# Patient Record
Sex: MALE | Race: BLACK OR AFRICAN AMERICAN | NOT HISPANIC OR LATINO | Employment: UNEMPLOYED | URBAN - METROPOLITAN AREA
[De-identification: names, ages, dates, MRNs, and addresses within clinical notes are randomized per-mention and may not be internally consistent; named-entity substitution may affect disease eponyms.]

---

## 2020-10-23 ENCOUNTER — HOSPITAL ENCOUNTER (INPATIENT)
Facility: HOSPITAL | Age: 30
LOS: 9 days | Discharge: HOME | DRG: 164 | End: 2020-11-01
Attending: HOSPITALIST | Admitting: HOSPITALIST
Payer: MEDICAID

## 2020-10-23 DIAGNOSIS — D52.8 OTHER FOLATE DEFICIENCY ANEMIAS: ICD-10-CM

## 2020-10-23 DIAGNOSIS — E66.01 MORBID OBESITY WITH BODY MASS INDEX OF 50.0-59.9 IN ADULT: ICD-10-CM

## 2020-10-23 DIAGNOSIS — D50.8 OTHER IRON DEFICIENCY ANEMIA: ICD-10-CM

## 2020-10-23 DIAGNOSIS — D75.A G6PD DEFICIENCY: ICD-10-CM

## 2020-10-23 DIAGNOSIS — J98.4 CAVITARY LESION OF LUNG: ICD-10-CM

## 2020-10-23 DIAGNOSIS — J93.0 SPONTANEOUS TENSION PNEUMOTHORAX: ICD-10-CM

## 2020-10-23 DIAGNOSIS — Z22.322 MRSA (METHICILLIN RESISTANT STAPH AUREUS) CULTURE POSITIVE: ICD-10-CM

## 2020-10-23 DIAGNOSIS — J93.12 SECONDARY SPONTANEOUS PNEUMOTHORAX: ICD-10-CM

## 2020-10-23 PROBLEM — J93.9 PNEUMOTHORAX: Status: ACTIVE | Noted: 2020-10-23

## 2020-10-23 PROCEDURE — 11000001 HC ACUTE MED/SURG PRIVATE ROOM

## 2020-10-23 RX ORDER — IBUPROFEN 200 MG
24 TABLET ORAL
Status: DISCONTINUED | OUTPATIENT
Start: 2020-10-24 | End: 2020-11-01 | Stop reason: HOSPADM

## 2020-10-23 RX ORDER — MORPHINE SULFATE 2 MG/ML
2 INJECTION, SOLUTION INTRAMUSCULAR; INTRAVENOUS EVERY 4 HOURS PRN
Status: DISCONTINUED | OUTPATIENT
Start: 2020-10-23 | End: 2020-11-01 | Stop reason: HOSPADM

## 2020-10-23 RX ORDER — IBUPROFEN 200 MG
16 TABLET ORAL
Status: DISCONTINUED | OUTPATIENT
Start: 2020-10-24 | End: 2020-11-01 | Stop reason: HOSPADM

## 2020-10-23 RX ORDER — GLUCAGON 1 MG
1 KIT INJECTION
Status: DISCONTINUED | OUTPATIENT
Start: 2020-10-24 | End: 2020-11-01 | Stop reason: HOSPADM

## 2020-10-23 RX ORDER — SODIUM CHLORIDE 0.9 % (FLUSH) 0.9 %
10 SYRINGE (ML) INJECTION
Status: DISCONTINUED | OUTPATIENT
Start: 2020-10-24 | End: 2020-11-01 | Stop reason: HOSPADM

## 2020-10-23 RX ORDER — ACETAMINOPHEN 325 MG/1
650 TABLET ORAL EVERY 6 HOURS PRN
Status: DISCONTINUED | OUTPATIENT
Start: 2020-10-24 | End: 2020-11-01 | Stop reason: HOSPADM

## 2020-10-23 RX ORDER — MORPHINE SULFATE 2 MG/ML
2 INJECTION, SOLUTION INTRAMUSCULAR; INTRAVENOUS EVERY 4 HOURS PRN
Status: DISCONTINUED | OUTPATIENT
Start: 2020-10-24 | End: 2020-10-23

## 2020-10-23 NOTE — LETTER
November 1, 2020             [facility]  [address] [place]  [address]  Phone: [phone]  Fax: [phone] November 1, 2020     Patient: Reggie An   YOB: 1990       To Whom It May Concern:    Reggie An was admitted to the hospital on 10/23/2020 10:45 PM and discharged on .  He may return to work on 11/8/2020 .  If you have any questions or concerns, please don't hesitate to call  office at [phone].      Sincerely,        Eliud Novoa MD  Department of Hospital Medicine

## 2020-10-23 NOTE — PLAN OF CARE
Outside Transfer Acceptance Note / Regional Referral Center    Date of acceptance: 10/23/2020    Referring facility/ provider:  [facility]/ Dr Maldonado    Reason for transfer: Higher level of care /  recurrent pneumothorax    Report from referring provider:    30-year-old m with a PMH of class 3 obesity was admitted to [facility] on 10/16 for a left pneumothorax presenting with acute onset of left-sided CP and SOB.      The patient was recently discharged from [facility] on 09/21 after a 4 day admission for left lower lung cavitary lesion.  CT chest at that time demonstrated a 5.7 x 4.4 cm left lower lobe cavitary lesion.  His respiratory cultures grew MRSA and AFB pos bacteria, but TB PCR was neg.  He was discharged home on Augmentin times 30 days with ID follow-up.    CXR on admission this time demonstrated left pneumothorax and the patient underwent left chest tube thoracostomy by general surgery in the ED.  CT chest demonstrated left lower lobe cavity lesion and a large residual of pneumothorax.  Subsequently he underwent a 2nd chest tube placement with the lung collapsing whenever the chest tube is clamped.  The referring physician spoke to [facility] thoracic surgery (Dr. Benitez) who agreed to evaluate the patient and asked that the patient be admitted to     Labs today Na 136 K 4.0 Cl 103 CO2 27 Glu 93 BUN 13 Cr 0.9 calcium 8.8 phosphorus 4.8 Mag 2.1 AST 21 ALT 46 alk-phos 69 lipase 96 troponin < 0.015 total bili 0.3 WBC 7.8 Hb 10.9 COVID neg    To Do List:    Admit to   Consult thoracic surgery (Dr. Benitez) and infectious disease    Upon patient arrival to floor, please send SecureChat to [facility] or call extension [phone] (if no answer, this will flip to a beeper, so enter your call back number) for Hospital Medicine admit team assignment and for additional admit orders for the patient.  Do not page the attending physician associated with the  patient on arrival (this physician may not be on duty at the time of arrival).  Rather, always call [phone] to reach the triage physician for orders and team assignment.    Agusto Bobo MD [facility]  / Hospital Medicine

## 2020-10-24 PROBLEM — D50.9 IRON DEFICIENCY ANEMIA: Status: ACTIVE | Noted: 2020-10-24

## 2020-10-24 PROBLEM — D52.9 FOLATE DEFICIENCY ANEMIA: Status: ACTIVE | Noted: 2020-10-24

## 2020-10-24 PROBLEM — J93.0 SPONTANEOUS TENSION PNEUMOTHORAX: Status: ACTIVE | Noted: 2020-10-23

## 2020-10-24 LAB
ALBUMIN SERPL BCP-MCNC: 3.3 G/DL (ref 3.5–5.2)
ALP SERPL-CCNC: 63 U/L (ref 55–135)
ALT SERPL W/O P-5'-P-CCNC: 17 U/L (ref 10–44)
ANION GAP SERPL CALC-SCNC: 8 MMOL/L (ref 8–16)
APTT BLDCRRT: 77.2 SEC (ref 21–32)
AST SERPL-CCNC: 16 U/L (ref 10–40)
BASOPHILS # BLD AUTO: 0.04 K/UL (ref 0–0.2)
BASOPHILS NFR BLD: 0.4 % (ref 0–1.9)
BILIRUB SERPL-MCNC: 0.4 MG/DL (ref 0.1–1)
BNP SERPL-MCNC: <10 PG/ML (ref 0–99)
BUN SERPL-MCNC: 11 MG/DL (ref 6–20)
CALCIUM SERPL-MCNC: 9.3 MG/DL (ref 8.7–10.5)
CHLORIDE SERPL-SCNC: 100 MMOL/L (ref 95–110)
CO2 SERPL-SCNC: 27 MMOL/L (ref 23–29)
CREAT SERPL-MCNC: 0.8 MG/DL (ref 0.5–1.4)
CREAT SERPL-MCNC: 0.8 MG/DL (ref 0.5–1.4)
DIFFERENTIAL METHOD: ABNORMAL
EOSINOPHIL # BLD AUTO: 0.3 K/UL (ref 0–0.5)
EOSINOPHIL NFR BLD: 2.9 % (ref 0–8)
ERYTHROCYTE [DISTWIDTH] IN BLOOD BY AUTOMATED COUNT: 14.1 % (ref 11.5–14.5)
EST. GFR  (AFRICAN AMERICAN): >60 ML/MIN/1.73 M^2
EST. GFR  (AFRICAN AMERICAN): >60 ML/MIN/1.73 M^2
EST. GFR  (NON AFRICAN AMERICAN): >60 ML/MIN/1.73 M^2
EST. GFR  (NON AFRICAN AMERICAN): >60 ML/MIN/1.73 M^2
ESTIMATED AVG GLUCOSE: 80 MG/DL (ref 68–131)
FERRITIN SERPL-MCNC: 217 NG/ML (ref 20–300)
FOLATE SERPL-MCNC: 3.8 NG/ML (ref 4–24)
GLUCOSE SERPL-MCNC: 101 MG/DL (ref 70–110)
HBA1C MFR BLD HPLC: 4.4 % (ref 4–5.6)
HCT VFR BLD AUTO: 37 % (ref 40–54)
HGB BLD-MCNC: 10.8 G/DL (ref 14–18)
IMM GRANULOCYTES # BLD AUTO: 0.02 K/UL (ref 0–0.04)
IMM GRANULOCYTES NFR BLD AUTO: 0.2 % (ref 0–0.5)
INR PPP: 1 (ref 0.8–1.2)
INR PPP: 4 (ref 0.8–1.2)
IRON SERPL-MCNC: 17 UG/DL (ref 45–160)
LYMPHOCYTES # BLD AUTO: 2.4 K/UL (ref 1–4.8)
LYMPHOCYTES NFR BLD: 25.4 % (ref 18–48)
MAGNESIUM SERPL-MCNC: 1.8 MG/DL (ref 1.6–2.6)
MCH RBC QN AUTO: 26.5 PG (ref 27–31)
MCHC RBC AUTO-ENTMCNC: 29.2 G/DL (ref 32–36)
MCV RBC AUTO: 91 FL (ref 82–98)
MONOCYTES # BLD AUTO: 1 K/UL (ref 0.3–1)
MONOCYTES NFR BLD: 10.3 % (ref 4–15)
NEUTROPHILS # BLD AUTO: 5.8 K/UL (ref 1.8–7.7)
NEUTROPHILS NFR BLD: 60.8 % (ref 38–73)
NRBC BLD-RTO: 0 /100 WBC
PHOSPHATE SERPL-MCNC: 4.8 MG/DL (ref 2.7–4.5)
PLATELET # BLD AUTO: 321 K/UL (ref 150–350)
PMV BLD AUTO: 10.3 FL (ref 9.2–12.9)
POTASSIUM SERPL-SCNC: 4.4 MMOL/L (ref 3.5–5.1)
PROT SERPL-MCNC: 8.3 G/DL (ref 6–8.4)
PROTHROMBIN TIME: 11.4 SEC (ref 9–12.5)
PROTHROMBIN TIME: 41.2 SEC (ref 9–12.5)
RBC # BLD AUTO: 4.07 M/UL (ref 4.6–6.2)
SATURATED IRON: 6 % (ref 20–50)
SODIUM SERPL-SCNC: 135 MMOL/L (ref 136–145)
TOTAL IRON BINDING CAPACITY: 289 UG/DL (ref 250–450)
TRANSFERRIN SERPL-MCNC: 195 MG/DL (ref 200–375)
TSH SERPL DL<=0.005 MIU/L-ACNC: 1.54 UIU/ML (ref 0.4–4)
VIT B12 SERPL-MCNC: 492 PG/ML (ref 210–950)
WBC # BLD AUTO: 9.45 K/UL (ref 3.9–12.7)

## 2020-10-24 PROCEDURE — 99223 PR INITIAL HOSPITAL CARE,LEVL III: ICD-10-PCS | Mod: ,,, | Performed by: INTERNAL MEDICINE

## 2020-10-24 PROCEDURE — 85025 COMPLETE CBC W/AUTO DIFF WBC: CPT

## 2020-10-24 PROCEDURE — 25000003 PHARM REV CODE 250: Performed by: STUDENT IN AN ORGANIZED HEALTH CARE EDUCATION/TRAINING PROGRAM

## 2020-10-24 PROCEDURE — 86703 HIV-1/HIV-2 1 RESULT ANTBDY: CPT

## 2020-10-24 PROCEDURE — 83036 HEMOGLOBIN GLYCOSYLATED A1C: CPT

## 2020-10-24 PROCEDURE — 82955 ASSAY OF G6PD ENZYME: CPT

## 2020-10-24 PROCEDURE — 85610 PROTHROMBIN TIME: CPT | Mod: 91

## 2020-10-24 PROCEDURE — 83540 ASSAY OF IRON: CPT

## 2020-10-24 PROCEDURE — 99233 PR SUBSEQUENT HOSPITAL CARE,LEVL III: ICD-10-PCS | Mod: ,,, | Performed by: STUDENT IN AN ORGANIZED HEALTH CARE EDUCATION/TRAINING PROGRAM

## 2020-10-24 PROCEDURE — 63600175 PHARM REV CODE 636 W HCPCS: Performed by: STUDENT IN AN ORGANIZED HEALTH CARE EDUCATION/TRAINING PROGRAM

## 2020-10-24 PROCEDURE — 83735 ASSAY OF MAGNESIUM: CPT

## 2020-10-24 PROCEDURE — 99233 PR SUBSEQUENT HOSPITAL CARE,LEVL III: ICD-10-PCS | Mod: ,,, | Performed by: THORACIC SURGERY (CARDIOTHORACIC VASCULAR SURGERY)

## 2020-10-24 PROCEDURE — 83880 ASSAY OF NATRIURETIC PEPTIDE: CPT

## 2020-10-24 PROCEDURE — 85610 PROTHROMBIN TIME: CPT

## 2020-10-24 PROCEDURE — 84443 ASSAY THYROID STIM HORMONE: CPT

## 2020-10-24 PROCEDURE — 82728 ASSAY OF FERRITIN: CPT

## 2020-10-24 PROCEDURE — 99233 SBSQ HOSP IP/OBS HIGH 50: CPT | Mod: ,,, | Performed by: STUDENT IN AN ORGANIZED HEALTH CARE EDUCATION/TRAINING PROGRAM

## 2020-10-24 PROCEDURE — 87040 BLOOD CULTURE FOR BACTERIA: CPT | Mod: 59

## 2020-10-24 PROCEDURE — 82746 ASSAY OF FOLIC ACID SERUM: CPT

## 2020-10-24 PROCEDURE — 85730 THROMBOPLASTIN TIME PARTIAL: CPT

## 2020-10-24 PROCEDURE — 82607 VITAMIN B-12: CPT

## 2020-10-24 PROCEDURE — 82565 ASSAY OF CREATININE: CPT

## 2020-10-24 PROCEDURE — 99233 SBSQ HOSP IP/OBS HIGH 50: CPT | Mod: ,,, | Performed by: THORACIC SURGERY (CARDIOTHORACIC VASCULAR SURGERY)

## 2020-10-24 PROCEDURE — 11000001 HC ACUTE MED/SURG PRIVATE ROOM

## 2020-10-24 PROCEDURE — 84100 ASSAY OF PHOSPHORUS: CPT

## 2020-10-24 PROCEDURE — 80053 COMPREHEN METABOLIC PANEL: CPT

## 2020-10-24 PROCEDURE — 80074 ACUTE HEPATITIS PANEL: CPT

## 2020-10-24 PROCEDURE — 36415 COLL VENOUS BLD VENIPUNCTURE: CPT

## 2020-10-24 PROCEDURE — 99223 1ST HOSP IP/OBS HIGH 75: CPT | Mod: ,,, | Performed by: INTERNAL MEDICINE

## 2020-10-24 RX ORDER — CLINDAMYCIN HYDROCHLORIDE 150 MG/1
600 CAPSULE ORAL EVERY 8 HOURS
Status: DISCONTINUED | OUTPATIENT
Start: 2020-10-24 | End: 2020-10-24

## 2020-10-24 RX ORDER — ENOXAPARIN SODIUM 100 MG/ML
40 INJECTION SUBCUTANEOUS EVERY 12 HOURS
Status: COMPLETED | OUTPATIENT
Start: 2020-10-24 | End: 2020-10-27

## 2020-10-24 RX ORDER — MAGNESIUM SULFATE HEPTAHYDRATE 40 MG/ML
2 INJECTION, SOLUTION INTRAVENOUS ONCE
Status: COMPLETED | OUTPATIENT
Start: 2020-10-24 | End: 2020-10-24

## 2020-10-24 RX ORDER — FERROUS SULFATE 325(65) MG
325 TABLET, DELAYED RELEASE (ENTERIC COATED) ORAL DAILY
Status: DISCONTINUED | OUTPATIENT
Start: 2020-10-24 | End: 2020-11-01 | Stop reason: HOSPADM

## 2020-10-24 RX ORDER — SENNOSIDES 8.6 MG/1
8.6 TABLET ORAL DAILY
Status: DISCONTINUED | OUTPATIENT
Start: 2020-10-24 | End: 2020-11-01 | Stop reason: HOSPADM

## 2020-10-24 RX ADMIN — MAGNESIUM SULFATE IN WATER 2 G: 40 INJECTION, SOLUTION INTRAVENOUS at 09:10

## 2020-10-24 RX ADMIN — MORPHINE SULFATE 2 MG: 2 INJECTION, SOLUTION INTRAMUSCULAR; INTRAVENOUS at 09:10

## 2020-10-24 RX ADMIN — CLINDAMYCIN HYDROCHLORIDE 600 MG: 150 CAPSULE ORAL at 06:10

## 2020-10-24 RX ADMIN — SENNOSIDES 8.6 MG: 8.6 TABLET, FILM COATED ORAL at 10:10

## 2020-10-24 RX ADMIN — FERROUS SULFATE TAB EC 325 MG (65 MG FE EQUIVALENT) 325 MG: 325 (65 FE) TABLET DELAYED RESPONSE at 10:10

## 2020-10-24 RX ADMIN — ACETAMINOPHEN 650 MG: 325 TABLET ORAL at 03:10

## 2020-10-24 RX ADMIN — MORPHINE SULFATE 2 MG: 2 INJECTION, SOLUTION INTRAMUSCULAR; INTRAVENOUS at 02:10

## 2020-10-24 RX ADMIN — FOLIC ACID-PYRIDOXINE-CYANOCOBALAMIN TAB 2.5-25-2 MG 1 TABLET: 2.5-25-2 TAB at 10:10

## 2020-10-24 RX ADMIN — ACETAMINOPHEN 650 MG: 325 TABLET ORAL at 10:10

## 2020-10-24 RX ADMIN — MORPHINE SULFATE 2 MG: 2 INJECTION, SOLUTION INTRAMUSCULAR; INTRAVENOUS at 06:10

## 2020-10-24 RX ADMIN — MORPHINE SULFATE 2 MG: 2 INJECTION, SOLUTION INTRAMUSCULAR; INTRAVENOUS at 12:10

## 2020-10-24 RX ADMIN — ENOXAPARIN SODIUM 40 MG: 40 INJECTION SUBCUTANEOUS at 09:10

## 2020-10-24 NOTE — PLAN OF CARE
On call CM requested to obtain records from [facility].  CM obtained signature from patient and faxed to [phone].

## 2020-10-24 NOTE — ASSESSMENT & PLAN NOTE
> Recurrent, currently with 2 (1 pigtail and 1 thoracostomy tube) chest tubes after initial one failed. Initially tension pneumothorax presumably due to his infectious cavitary lesion. Here for second opinion. Patient develops symptoms after clamped  > On 10/20, CT showed large left pneumothroax with mediastinal shift to right and large volume subcutaneous emphysema in left chest wall. Subsequent CXR showed adequate expansion after Chest tube adjusment.   > Repeat CXR here at [facility] show's small apical left sided pneumothorax    Likely recurrent pneumothorax due to his cavitary lesion.     - Chest tubes placed to suction, drains on side of bed  - Will repeat CXR for baseline since he was not sent with OSH records  - Consult thoracic surgery for recommendations   - Pain regimen added   - Supplemental O2 as needed

## 2020-10-24 NOTE — ASSESSMENT & PLAN NOTE
> Sputum cultures at [facility] MRSA+, + 1/3 AFB cultures, TB/MAC PCR negative.   > on 9/21 at [facility], CT chest demonstrated a 5.7 x 4.4 cm left lower lobe cavitary lesion. Was initially treated at [facility] with augmentin after growing gram negative rods on cultures, however was also growing MRSA. Today he was started on PO clindamycin at [facility] today  > on 10/20 CT surgery was consulted for wedge vs lobectomy, but no surgical intervention was done due to no significant blebs and likely infectious etiology.     - Will consult ID for further recommendations  - Will continue on clindamycin 600 mg PO q8hrs  - Chest tube drainage fluid cultures, blood cultures and respiratory cultures will be of limited value at this point since patient has been treated with antibiotics for past month; however, will redraw drainage fluid cultures for now.   - [facility] did draw tube drainage bacterial, fungal and AFB cultures--will need records  - May need repeat CT if we can't get the images from [facility]; waiting on result of records from [facility]  - Consider pulm consult with BAL or fungal cultures w/ fungitell if still having reccurent PTX

## 2020-10-24 NOTE — ASSESSMENT & PLAN NOTE
31yo morbidly obese male without any other significant medical history who presented to [facility] as transfer from OSH for evaluation given recurrent left sided pneumothoraces. Thoracic surgery consulted for evaluation and potential surgical intervention    - Patient seen and examined, labs and imaging reviewed, discussed with staff  - Recommend placing chest tubes to suction given persistent PTX on imaging and air leak on clinical exam  - Please repeat CT Chest given lack of available imaging and likely at least a week time interval since last scan at OSH  - Thoracic surgery will review the imaging and determine if surgical intervention is warranted  - Currently no need for urgent/emergent intervention; please call with any changes in clinical status, questions or concerns.

## 2020-10-24 NOTE — PLAN OF CARE
"Saw patient at bedside for broken chest tube. Per RN, patient may have broken chest tube while going to the bathroom earlier today which RN fixed by wrapping with self-adherent wrap. Patient now reports some hiccups and "bubbling" around insertion site of chest tube. On examination the chest tube has not been dislodged from the skin but there is small amount of fluid seeping underneath the tape holding the chest tube in place. Chest tube is still to suction. Patient HDS and oxygenating well. He denies chest pain or shortness of breath. XR AP Portable obtained which appears unchanged with tube still in place, but pending official read. Patient has CT scan scheduled that is also pending. Discussed patient with CT Surgery.    Aston Riggins MD  Internal Medicine, PGY-1    "

## 2020-10-24 NOTE — H&P
[facility] Hospital Medicine  History & Physical    Patient Name: Reggie An  MRN: [id number]  Admission Date: 10/23/2020  Attending Physician: Anson Bustos MD   Primary Care Provider: [facility] Medicine Team: [facility] MED 2 Vidal Gee MD     Patient information was obtained from patient and ER records.     Subjective:     Principal Problem:Pneumothorax    Chief Complaint: No chief complaint on file.       HPI: Mr. An is a 30 y.o. M with morbid obesity presenting of left sided chest pain and SOB since 10/16. He was transferred here from [facility] for a second opinion. Around the week prior to 9/20, patient reported flu like symptoms such as fatigue, some nasal congestion and SOB. He said he started to feel better and his cold symptoms were improving but Around 9/20 he developed significant left sided chest pain, worse with inspiration. He went to the ER and was found to have a LLL cavitary lesion. An AFB smear was positive for TB and MRSA, but TB/MAC PCR was negative. He was discharged with augmentin for 30 days because also growing gram negative rods. About another week goes by and his left sided chest pain and SOB comes back. He goes back to [facility] ER on 10/16 and was found to have a pneumothroax likely secondary to his cavitary lesion. A chest tube was placed with improvement of symptoms. However, during his stay he had intermittent episodes of chest pain and sob and repeat imaging showed recurring pneumothorax. Another chest tube was placed and he was transferred to [facility] for a second opinion regarding recurrent pneumothorax. He states that when ever his chest tube is clamped he develops symptoms. He denies any travel history or sick contacts that he knows of. He denies fevers, chills, n/v/d or cough.              History reviewed. No pertinent past medical history.    Past Surgical History:   Procedure Laterality Date    SHOULDER ARTHROSCOPY Left        Review of  patient's allergies indicates:   Allergen Reactions    Shellfish containing products        No current facility-administered medications on file prior to encounter.      No current outpatient medications on file prior to encounter.     Family History     Problem Relation (Age of Onset)    Diabetes Mother, Father        Tobacco Use    Smoking status: Never Smoker   Substance and Sexual Activity    Alcohol use: Not Currently    Drug use: Never    Sexual activity: Not Currently     Review of Systems   Constitutional: Negative for chills, fatigue and fever.   HENT: Negative for hearing loss, rhinorrhea and sinus pain.    Eyes: Negative for photophobia and visual disturbance.   Respiratory: Positive for shortness of breath. Negative for cough, choking, chest tightness and wheezing.    Cardiovascular: Positive for chest pain. Negative for palpitations.   Gastrointestinal: Negative for abdominal distention, abdominal pain, diarrhea and nausea.   Genitourinary: Negative for difficulty urinating, dysuria, flank pain and hematuria.   Musculoskeletal: Negative for back pain, joint swelling and myalgias.   Skin: Negative for color change and pallor.   Neurological: Negative for dizziness, facial asymmetry, weakness and headaches.   Psychiatric/Behavioral: Negative for agitation and confusion.     Objective:     Vital Signs (Most Recent):  Temp: 98.3 °F (36.8 °C) (10/24/20 0017)  Pulse: 108 (10/24/20 0017)  Resp: 16 (10/24/20 0025)  BP: 138/61 (10/24/20 0017)  SpO2: 98 % (10/24/20 0017) Vital Signs (24h Range):  Temp:  [98.1 °F (36.7 °C)-98.3 °F (36.8 °C)] 98.3 °F (36.8 °C)  Pulse:  [] 108  Resp:  [16-18] 16  SpO2:  [98 %-99 %] 98 %  BP: (119-138)/(61-82) 138/61     Weight: (!) 198.4 kg (437 lb 6.3 oz)  There is no height or weight on file to calculate BMI.    Physical Exam  Vitals signs reviewed.   Constitutional:       General: He is not in acute distress.     Appearance: Normal appearance. He is obese. He is not  ill-appearing.   HENT:      Head: Normocephalic and atraumatic.      Right Ear: External ear normal.      Left Ear: External ear normal.      Nose: Nose normal.      Mouth/Throat:      Pharynx: Oropharynx is clear.   Eyes:      Extraocular Movements: Extraocular movements intact.      Conjunctiva/sclera: Conjunctivae normal.      Pupils: Pupils are equal, round, and reactive to light.   Neck:      Musculoskeletal: Normal range of motion. No neck rigidity or muscular tenderness.   Cardiovascular:      Rate and Rhythm: Normal rate and regular rhythm.      Heart sounds: No murmur. No gallop.    Pulmonary:      Effort: Pulmonary effort is normal. No respiratory distress.      Breath sounds: No wheezing or rhonchi.      Comments: Two test tubes placed left upper chest. 1 pig tail tube and 1 thoracostomy tube. Former draining yellow clear fluid other draining serosanguinous fluid, both to suction  Abdominal:      General: There is no distension.      Palpations: Abdomen is soft.      Tenderness: There is no abdominal tenderness. There is no guarding.   Musculoskeletal:         General: No swelling, tenderness or deformity.      Right lower leg: No edema.      Left lower leg: No edema.   Skin:     General: Skin is warm and dry.      Coloration: Skin is not jaundiced.   Neurological:      General: No focal deficit present.      Mental Status: He is alert and oriented to person, place, and time. Mental status is at baseline.      Motor: No weakness.           CRANIAL NERVES     CN III, IV, VI   Pupils are equal, round, and reactive to light.       Significant Labs: CBC: No results for input(s): WBC, HGB, HCT, PLT in the last 48 hours.  CMP: No results for input(s): NA, K, CL, CO2, GLU, BUN, CREATININE, CALCIUM, PROT, ALBUMIN, BILITOT, ALKPHOS, AST, ALT, ANIONGAP, EGFRNONAA in the last 48 hours.    Invalid input(s): ESTGFAFRICA    Significant Imaging: I have reviewed all pertinent imaging results/findings within the past 24  hours.    Assessment/Plan:     * Pneumothorax  > Recurrent, currently with 2 (1 pigtail and 1 thoracostomy tube) chest tubes after initial one failed. Initially tension pneumothorax presumably due to his infectious cavitary lesion. Here for second opinion. Patient develops symptoms after clamped  > On 10/20, CT showed large left pneumothroax with mediastinal shift to right and large volume subcutaneous emphysema in left chest wall. Subsequent CXR showed adequate expansion after Chest tube adjusment.   > Repeat CXR here at [facility] show's small apical left sided pneumothorax    Likely recurrent pneumothorax due to his cavitary lesion.     - Chest tubes placed to suction, drains on side of bed  - Will repeat CXR for baseline since he was not sent with OSH records  - Consult thoracic surgery for recommendations   - Pain regimen added   - Supplemental O2 as needed        Cavitary lesion of lung  > Sputum cultures at [facility] MRSA+, + 1/3 AFB cultures, TB/MAC PCR negative.   > on 9/21 at [facility], CT chest demonstrated a 5.7 x 4.4 cm left lower lobe cavitary lesion. Was initially treated at [facility] with augmentin after growing gram negative rods on cultures, however was also growing MRSA. Today he was started on PO clindamycin at [facility] today  > on 10/20 CT surgery was consulted for wedge vs lobectomy, but no surgical intervention was done due to no significant blebs and likely infectious etiology.     - Will consult ID for further recommendations  - Will continue on clindamycin 600 mg PO q8hrs  - Chest tube drainage fluid cultures, blood cultures and respiratory cultures will be of limited value at this point since patient has been treated with antibiotics for past month; however, will redraw drainage fluid cultures for now.   - [facility] did draw tube drainage bacterial, fungal and AFB cultures--will need records  - May need repeat CT if we can't get the images from [facility]; waiting on result of records from [facility]  - Consider pulm  consult with BAL or fungal cultures w/ fungitell if still having reccurent PTX        VTE Risk Mitigation (From admission, onward)         Ordered     enoxaparin injection 40 mg  Every 12 hours      10/24/20 0049     IP VTE LOW RISK PATIENT  Once      10/23/20 2321     Place sequential compression device  Until discontinued      10/23/20 2321                   Vidal Gee MD  Department of Hospital Medicine   [facility]

## 2020-10-24 NOTE — HPI
Patient is a 30 year old male with morbid obesity who presents as a transfer for a second opinion regarding evaluation of multiple left sided pneumothoraces. Patient was not sent with hospital records so history gathered primarily from patient report. His problems started in late September with flu like symptoms including fatigue, nasal congestion, and SOB. Though there was initial improvement he started to experience significant left sided chest pain. He went to [facility] ER and found to have LLL cavitary lesion. Patient supposedly had MRSA I his sputum, and had a negative TB PCR.  He reports that he was discharged with 30 day course of augmentin; this was due to gram negative rods, unclear of the source. Patient returned to [facility] on 10/16 and was found to have pneumothorax. He had a chest tube placed. Although his symptoms improved initially he continued to have chest pain and was found to have recurring pneumothorax. He had an additional chest tube placed for this. Following this he had additional chest tube place prior to transfer. He reports that he saw ID at [facility], but he was unable to give details regarding their assessment. At the time of my exam, patient denies any SOB, chest pain, cough, fevers, chills, night sweats, or unintentional weight loss. He denies any known sick contacts, incarceration, exposure to homeless population, recent travel, outdoor activity, or pets in his home.

## 2020-10-24 NOTE — SUBJECTIVE & OBJECTIVE
No current facility-administered medications on file prior to encounter.      No current outpatient medications on file prior to encounter.       Review of patient's allergies indicates:   Allergen Reactions    Shellfish containing products        History reviewed. No pertinent past medical history.  Past Surgical History:   Procedure Laterality Date    SHOULDER ARTHROSCOPY Left      Family History     Problem Relation (Age of Onset)    Diabetes Mother, Father        Tobacco Use    Smoking status: Never Smoker   Substance and Sexual Activity    Alcohol use: Not Currently    Drug use: Never    Sexual activity: Not Currently     Review of Systems   Constitutional: Negative for chills, fatigue and fever.   HENT: Negative for hearing loss, rhinorrhea and sinus pain.    Eyes: Negative for photophobia and visual disturbance.   Respiratory: Positive for shortness of breath. Negative for cough, choking, chest tightness and wheezing.    Cardiovascular: Positive for chest pain. Negative for palpitations.   Gastrointestinal: Negative for abdominal distention, abdominal pain, diarrhea and nausea.   Genitourinary: Negative for difficulty urinating, dysuria, flank pain and hematuria.   Musculoskeletal: Negative for back pain, joint swelling and myalgias.   Skin: Negative for color change and pallor.   Neurological: Negative for dizziness, facial asymmetry, weakness and headaches.   Psychiatric/Behavioral: Negative for agitation and confusion.     Objective:     Vital Signs (Most Recent):  Temp: 98.3 °F (36.8 °C) (10/24/20 0017)  Pulse: 108 (10/24/20 0017)  Resp: 16 (10/24/20 0608)  BP: 138/61 (10/24/20 0017)  SpO2: 98 % (10/24/20 0017) Vital Signs (24h Range):  Temp:  [98.1 °F (36.7 °C)-98.3 °F (36.8 °C)] 98.3 °F (36.8 °C)  Pulse:  [] 108  Resp:  [16-18] 16  SpO2:  [98 %-99 %] 98 %  BP: (119-138)/(61-82) 138/61     Weight: (!) 198.4 kg (437 lb 6.3 oz)  Body mass index is 56.16 kg/m².    Physical Exam  Vitals signs  reviewed.   Constitutional:       General: He is not in acute distress.     Appearance: Normal appearance. He is obese. He is not ill-appearing.   HENT:      Head: Normocephalic and atraumatic.      Right Ear: External ear normal.      Left Ear: External ear normal.      Nose: Nose normal.      Mouth/Throat:      Pharynx: Oropharynx is clear.   Eyes:      Extraocular Movements: Extraocular movements intact.      Conjunctiva/sclera: Conjunctivae normal.      Pupils: Pupils are equal, round, and reactive to light.   Neck:      Musculoskeletal: Normal range of motion. No neck rigidity or muscular tenderness.   Cardiovascular:      Rate and Rhythm: Normal rate and regular rhythm.      Heart sounds: No murmur. No gallop.    Pulmonary:      Effort: Pulmonary effort is normal. No respiratory distress.      Breath sounds: No wheezing or rhonchi.      Comments: Two test tubes placed left upper chest  Upper chest tube with serosanguinous output  Lower pigtail catheter with thin serous output, + air leak  Abdominal:      General: There is no distension.      Palpations: Abdomen is soft.      Tenderness: There is no abdominal tenderness. There is no guarding.   Musculoskeletal:         General: No swelling, tenderness or deformity.      Right lower leg: No edema.      Left lower leg: No edema.   Skin:     General: Skin is warm and dry.      Coloration: Skin is not jaundiced.   Neurological:      General: No focal deficit present.      Mental Status: He is alert and oriented to person, place, and time. Mental status is at baseline.      Motor: No weakness.         Significant Labs:  CBC:   Recent Labs   Lab 10/24/20  0405   WBC 9.45   RBC 4.07*   HGB 10.8*   HCT 37.0*      MCV 91   MCH 26.5*   MCHC 29.2*     CMP:   Recent Labs   Lab 10/24/20  0404 10/24/20  0405     --    CALCIUM 9.3  --    ALBUMIN 3.3*  --    PROT 8.3  --    *  --    K 4.4  --    CO2 27  --      --    BUN 11  --    CREATININE 0.8 0.8    ALKPHOS 63  --    ALT 17  --    AST 16  --    BILITOT 0.4  --        Significant Diagnostics:  I have reviewed all pertinent imaging results/findings within the past 24 hours.   CXR with small residual apical PTX

## 2020-10-24 NOTE — CONSULTS
[facility]  Infectious Disease  Consult Note    Patient Name: Reggie An  MRN: [id number]  Admission Date: 10/23/2020  Hospital Length of Stay: 1 days  Attending Physician: Anson Bustos MD  Primary Care Provider: Primary Doctor No     Isolation Status: Contact and Airborne    Patient information was obtained from patient, past medical records and ER records.      Inpatient consult to Infectious Diseases  Consult performed by: Yasmani Sol MD  Consult ordered by: Vidal Gee MD        Assessment/Plan:     Cavitary lesion of lung  30 year old male who presents to [facility] for second opinion regarding multiple occurences of pneumothorax. Hospital course is somewhat unclear and patient does not have records with [place] from [place]. Patient reports that he was put on augmentin for a 30 day course which he says he completed most of it. He was reportedly growing MRSA in his sputum and gram negative rods (source unclear). In addition, there are reports of negative TB test via PCR. Patient has reportedly had 3 chest tubes put in him (last one placed prior to transfer). One of which was used to drain the cavitary lesion, he does not know if those grew anything. Patient is comfortable on room air and denies any SOB, current chest pain, fevers, chills, unintentional weight loss, or night sweats. He denies any recent travel, incarceration, work with the homeless population, or outdoor activities. No known sick contacts, no pets at home. He was started on oral clindamycin prior to transfer    - Will need record of hospital course at [place], attempted to acquire however told that micro lab will not give results and that medical records must be contacted, furthermore, no one is available from there on weekends  -  Recommend discontinuing clindamycin and observing off antibiotics. Patient denies any fever or chills, he is hemodynamically stable, chest x-ray showing primarily apical pneumothorax,  and he is comfortable and saturating well on room air.   - If patient decompensated, can do broad spectrum vanc and cefepime  - Since we do not have records from [facility], would recommend ordering the necessary airborne precautions until records are acquired or until work up is negative here  - Agree with getting CT chest, follow up CT surgery recommendation  - Blood cultures not collected on current visit, would still recommend even though yield may be low due to previous antibiotic use  - Would also recommend TTE as patient had MRSA in sputum, unclear if he had bacteremia and he does not have an echo in our system          Thank you for your consult. I will follow-up with patient. Please contact us if you have any additional questions.    Yasmani Sol MD  Infectious Disease  [facility]    Subjective:     Principal Problem: Spontaneous tension pneumothorax    HPI: Patient is a 30 year old male with morbid obesity who presents as a transfer for a second opinion regarding evaluation of multiple left sided pneumothoraces. Patient was not sent with hospital records so history gathered primarily from patient report. His problems started in late September with flu like symptoms including fatigue, nasal congestion, and SOB. Though there was initial improvement he started to experience significant left sided chest pain. He went to [facility] ER and found to have LLL cavitary lesion. Patient supposedly had MRSA I his sputum, and had a negative TB PCR.  He reports that he was discharged with 30 day course of augmentin; this was due to gram negative rods, unclear of the source. Patient returned to [facility] on 10/16 and was found to have pneumothorax. He had a chest tube placed. Although his symptoms improved initially he continued to have chest pain and was found to have recurring pneumothorax. He had an additional chest tube placed for this. Following this he had additional chest tube place prior to  transfer. He reports that he saw ID at [facility], but he was unable to give details regarding their assessment. At the time of my exam, patient denies any SOB, chest pain, cough, fevers, chills, night sweats, or unintentional weight loss. He denies any known sick contacts, incarceration, exposure to homeless population, recent travel, outdoor activity, or pets in his home.     History reviewed. No pertinent past medical history.    Past Surgical History:   Procedure Laterality Date    SHOULDER ARTHROSCOPY Left        Review of patient's allergies indicates:   Allergen Reactions    Shellfish containing products        Medications:  No medications prior to admission.     Antibiotics (From admission, onward)    None        Antifungals (From admission, onward)    None        Antivirals (From admission, onward)    None             There is no immunization history on file for this patient.    Family History     Problem Relation (Age of Onset)    Diabetes Mother, Father        Social History     Socioeconomic History    Marital status: Single     Spouse name: Not on file    Number of children: Not on file    Years of education: Not on file    Highest education level: Not on file   Occupational History    Not on file   Social Needs    Financial resource strain: Not on file    Food insecurity     Worry: Not on file     Inability: Not on file    Transportation needs     Medical: Not on file     Non-medical: Not on file   Tobacco Use    Smoking status: Never Smoker   Substance and Sexual Activity    Alcohol use: Not Currently    Drug use: Never    Sexual activity: Not Currently   Lifestyle    Physical activity     Days per week: Not on file     Minutes per session: Not on file    Stress: Not on file   Relationships    Social connections     Talks on phone: Not on file     Gets together: Not on file     Attends Mandaeism service: Not on file     Active member of club or organization: Not on file     Attends  meetings of clubs or organizations: Not on file     Relationship status: Not on file   Other Topics Concern    Not on file   Social History Narrative    Not on file     Review of Systems   Constitutional: Negative for activity change.   HENT: Negative for congestion and sore throat.    Respiratory: Negative for cough, chest tightness, shortness of breath and wheezing.    Cardiovascular: Negative for chest pain, palpitations and leg swelling.   Gastrointestinal: Negative for abdominal pain, constipation, diarrhea, nausea and vomiting.   Genitourinary: Negative for dysuria and flank pain.   Musculoskeletal: Negative for arthralgias, back pain and myalgias.   Skin: Negative for color change and pallor.   Neurological: Negative for dizziness, weakness and headaches.     Objective:     Vital Signs (Most Recent):  Temp: 97.9 °F (36.6 °C) (10/24/20 1223)  Pulse: 106 (10/24/20 1223)  Resp: 20 (10/24/20 1223)  BP: (!) 140/64 (10/24/20 1223)  SpO2: 98 % (10/24/20 1223) Vital Signs (24h Range):  Temp:  [97.9 °F (36.6 °C)-98.3 °F (36.8 °C)] 97.9 °F (36.6 °C)  Pulse:  [] 106  Resp:  [16-20] 20  SpO2:  [92 %-99 %] 98 %  BP: (132-140)/(61-83) 140/64     Weight: (!) 198.4 kg (437 lb 6.3 oz)  Body mass index is 56.16 kg/m².    Estimated Creatinine Clearance: 243.5 mL/min (based on SCr of 0.8 mg/dL).    Physical Exam  Vitals signs reviewed.   Constitutional:       General: He is not in acute distress.     Appearance: He is well-developed. He is obese. He is not ill-appearing.   HENT:      Head: Normocephalic and atraumatic.      Right Ear: External ear normal.      Left Ear: External ear normal.      Nose: Nose normal.      Mouth/Throat:      Mouth: Mucous membranes are moist.      Pharynx: Oropharynx is clear. No oropharyngeal exudate.   Eyes:      General: No scleral icterus.     Extraocular Movements: Extraocular movements intact.      Conjunctiva/sclera: Conjunctivae normal.   Neck:      Musculoskeletal: Normal range of  motion and neck supple.   Cardiovascular:      Rate and Rhythm: Normal rate and regular rhythm.      Heart sounds: Normal heart sounds.   Pulmonary:      Effort: Pulmonary effort is normal.      Breath sounds: Wheezing (minor) present. No rales.      Comments: Two chest tubes in place, draining serosanguinous fluid  Abdominal:      General: Bowel sounds are normal.      Palpations: Abdomen is soft.   Musculoskeletal: Normal range of motion.   Neurological:      Mental Status: He is alert and oriented to person, place, and time.   Psychiatric:         Behavior: Behavior normal.         Significant Labs: All pertinent labs within the past 24 hours have been reviewed.    Significant Imaging: I have reviewed all pertinent imaging results/findings within the past 24 hours.

## 2020-10-24 NOTE — SUBJECTIVE & OBJECTIVE
History reviewed. No pertinent past medical history.    Past Surgical History:   Procedure Laterality Date    SHOULDER ARTHROSCOPY Left        Review of patient's allergies indicates:   Allergen Reactions    Shellfish containing products        Medications:  No medications prior to admission.     Antibiotics (From admission, onward)    None        Antifungals (From admission, onward)    None        Antivirals (From admission, onward)    None             There is no immunization history on file for this patient.    Family History     Problem Relation (Age of Onset)    Diabetes Mother, Father        Social History     Socioeconomic History    Marital status: Single     Spouse name: Not on file    Number of children: Not on file    Years of education: Not on file    Highest education level: Not on file   Occupational History    Not on file   Social Needs    Financial resource strain: Not on file    Food insecurity     Worry: Not on file     Inability: Not on file    Transportation needs     Medical: Not on file     Non-medical: Not on file   Tobacco Use    Smoking status: Never Smoker   Substance and Sexual Activity    Alcohol use: Not Currently    Drug use: Never    Sexual activity: Not Currently   Lifestyle    Physical activity     Days per week: Not on file     Minutes per session: Not on file    Stress: Not on file   Relationships    Social connections     Talks on phone: Not on file     Gets together: Not on file     Attends Roman [employer] service: Not on file     Active member of club or organization: Not on file     Attends meetings of clubs or organizations: Not on file     Relationship status: Not on file   Other Topics Concern    Not on file   Social History Narrative    Not on file     Review of Systems   Constitutional: Negative for activity change.   HENT: Negative for congestion and sore throat.    Respiratory: Negative for cough, chest tightness, shortness of breath and wheezing.     Cardiovascular: Negative for chest pain, palpitations and leg swelling.   Gastrointestinal: Negative for abdominal pain, constipation, diarrhea, nausea and vomiting.   Genitourinary: Negative for dysuria and flank pain.   Musculoskeletal: Negative for arthralgias, back pain and myalgias.   Skin: Negative for color change and pallor.   Neurological: Negative for dizziness, weakness and headaches.     Objective:     Vital Signs (Most Recent):  Temp: 97.9 °F (36.6 °C) (10/24/20 1223)  Pulse: 106 (10/24/20 1223)  Resp: 20 (10/24/20 1223)  BP: (!) 140/64 (10/24/20 1223)  SpO2: 98 % (10/24/20 1223) Vital Signs (24h Range):  Temp:  [97.9 °F (36.6 °C)-98.3 °F (36.8 °C)] 97.9 °F (36.6 °C)  Pulse:  [] 106  Resp:  [16-20] 20  SpO2:  [92 %-99 %] 98 %  BP: (132-140)/(61-83) 140/64     Weight: (!) 198.4 kg (437 lb 6.3 oz)  Body mass index is 56.16 kg/m².    Estimated Creatinine Clearance: 243.5 mL/min (based on SCr of 0.8 mg/dL).    Physical Exam  Vitals signs reviewed.   Constitutional:       General: He is not in acute distress.     Appearance: He is well-developed. He is obese. He is not ill-appearing.   HENT:      Head: Normocephalic and atraumatic.      Right Ear: External ear normal.      Left Ear: External ear normal.      Nose: Nose normal.      Mouth/Throat:      Mouth: Mucous membranes are moist.      Pharynx: Oropharynx is clear. No oropharyngeal exudate.   Eyes:      General: No scleral icterus.     Extraocular Movements: Extraocular movements intact.      Conjunctiva/sclera: Conjunctivae normal.   Neck:      Musculoskeletal: Normal range of motion and neck supple.   Cardiovascular:      Rate and Rhythm: Normal rate and regular rhythm.      Heart sounds: Normal heart sounds.   Pulmonary:      Effort: Pulmonary effort is normal.      Breath sounds: Wheezing (minor) present. No rales.      Comments: Two chest tubes in place, draining serosanguinous fluid  Abdominal:      General: Bowel sounds are normal.       Palpations: Abdomen is soft.   Musculoskeletal: Normal range of motion.   Neurological:      Mental Status: He is alert and oriented to person, place, and time.   Psychiatric:         Behavior: Behavior normal.         Significant Labs: All pertinent labs within the past 24 hours have been reviewed.    Significant Imaging: I have reviewed all pertinent imaging results/findings within the past 24 hours.

## 2020-10-24 NOTE — SUBJECTIVE & OBJECTIVE
History reviewed. No pertinent past medical history.    Past Surgical History:   Procedure Laterality Date    SHOULDER ARTHROSCOPY Left        Review of patient's allergies indicates:   Allergen Reactions    Shellfish containing products        No current facility-administered medications on file prior to encounter.      No current outpatient medications on file prior to encounter.     Family History     Problem Relation (Age of Onset)    Diabetes Mother, Father        Tobacco Use    Smoking status: Never Smoker   Substance and Sexual Activity    Alcohol use: Not Currently    Drug use: Never    Sexual activity: Not Currently     Review of Systems   Constitutional: Negative for chills, fatigue and fever.   HENT: Negative for hearing loss, rhinorrhea and sinus pain.    Eyes: Negative for photophobia and visual disturbance.   Respiratory: Positive for shortness of breath. Negative for cough, choking, chest tightness and wheezing.    Cardiovascular: Positive for chest pain. Negative for palpitations.   Gastrointestinal: Negative for abdominal distention, abdominal pain, diarrhea and nausea.   Genitourinary: Negative for difficulty urinating, dysuria, flank pain and hematuria.   Musculoskeletal: Negative for back pain, joint swelling and myalgias.   Skin: Negative for color change and pallor.   Neurological: Negative for dizziness, facial asymmetry, weakness and headaches.   Psychiatric/Behavioral: Negative for agitation and confusion.     Objective:     Vital Signs (Most Recent):  Temp: 98.3 °F (36.8 °C) (10/24/20 0017)  Pulse: 108 (10/24/20 0017)  Resp: 16 (10/24/20 0025)  BP: 138/61 (10/24/20 0017)  SpO2: 98 % (10/24/20 0017) Vital Signs (24h Range):  Temp:  [98.1 °F (36.7 °C)-98.3 °F (36.8 °C)] 98.3 °F (36.8 °C)  Pulse:  [] 108  Resp:  [16-18] 16  SpO2:  [98 %-99 %] 98 %  BP: (119-138)/(61-82) 138/61     Weight: (!) 198.4 kg (437 lb 6.3 oz)  There is no height or weight on file to calculate  BMI.    Physical Exam  Vitals signs reviewed.   Constitutional:       General: He is not in acute distress.     Appearance: Normal appearance. He is obese. He is not ill-appearing.   HENT:      Head: Normocephalic and atraumatic.      Right Ear: External ear normal.      Left Ear: External ear normal.      Nose: Nose normal.      Mouth/Throat:      Pharynx: Oropharynx is clear.   Eyes:      Extraocular Movements: Extraocular movements intact.      Conjunctiva/sclera: Conjunctivae normal.      Pupils: Pupils are equal, round, and reactive to light.   Neck:      Musculoskeletal: Normal range of motion. No neck rigidity or muscular tenderness.   Cardiovascular:      Rate and Rhythm: Normal rate and regular rhythm.      Heart sounds: No murmur. No gallop.    Pulmonary:      Effort: Pulmonary effort is normal. No respiratory distress.      Breath sounds: No wheezing or rhonchi.      Comments: Two test tubes placed left upper chest. 1 pig tail tube and 1 thoracostomy tube. Former draining yellow clear fluid other draining serosanguinous fluid, both to suction  Abdominal:      General: There is no distension.      Palpations: Abdomen is soft.      Tenderness: There is no abdominal tenderness. There is no guarding.   Musculoskeletal:         General: No swelling, tenderness or deformity.      Right lower leg: No edema.      Left lower leg: No edema.   Skin:     General: Skin is warm and dry.      Coloration: Skin is not jaundiced.   Neurological:      General: No focal deficit present.      Mental Status: He is alert and oriented to person, place, and time. Mental status is at baseline.      Motor: No weakness.           CRANIAL NERVES     CN III, IV, VI   Pupils are equal, round, and reactive to light.       Significant Labs: CBC: No results for input(s): WBC, HGB, HCT, PLT in the last 48 hours.  CMP: No results for input(s): NA, K, CL, CO2, GLU, BUN, CREATININE, CALCIUM, PROT, ALBUMIN, BILITOT, ALKPHOS, AST, ALT,  ANIONGAP, EGFRNONAA in the last 48 hours.    Invalid input(s): ESTLUCASAFGHADA    Significant Imaging: I have reviewed all pertinent imaging results/findings within the past 24 hours.

## 2020-10-24 NOTE — ASSESSMENT & PLAN NOTE
30 year old male who presents to [facility] for second opinion regarding multiple occurences of pneumothorax. Hospital course is somewhat unclear and patient does not have records with him from [facility]. Patient reports that he was put on augmentin for a 30 day course which he says he completed most of it. He was reportedly growing MRSA in his sputum and gram negative rods (source unclear). In addition, there are reports of negative TB test via PCR. Patient has reportedly had 3 chest tubes put in him (last one placed prior to transfer). One of which was used to drain the cavitary lesion, he does not know if those grew anything. Patient is comfortable on room air and denies any SOB, current chest pain, fevers, chills, unintentional weight loss, or night sweats. He denies any recent travel, incarceration, work with the homeless population, or outdoor activities. No known sick contacts, no pets at home. He was started on oral clindamycin prior to transfer    - Will need record of hospital course at [facility], attempted to acquire however told that micro lab will not give results and that medical records must be contacted, furthermore, no one is available from there on weekends  -  Recommend discontinuing clindamycin and observing off antibiotics. Patient denies any fever or chills, he is hemodynamically stable, chest x-ray showing primarily apical pneumothorax, and he is comfortable and saturating well on room air.   - If patient decompensated, can do broad spectrum vanc and cefepime  - Since we do not have records from [facility], would recommend ordering the necessary airborne precautions until records are acquired or until work up is negative here  - Agree with getting CT chest, follow up CT surgery recommendation  - Blood cultures not collected on current visit, would still recommend even though yield may be low due to previous antibiotic use  - Would also recommend TTE as patient had MRSA in sputum, unclear if he  had bacteremia and he does not have an echo in our system

## 2020-10-24 NOTE — HPI
Patient is a 29yo morbidly obese male without any other significant medical history who presented to [facility] as transfer from OSH for evaluation given recurrent left sided pneumothoraces. Patient reports prior to mid-September he was in his usual state of health. Around 9/20 he developed flu-like symptoms with fatigue, congestion and some shortness of breath. He later experienced appreciable left sided chest pain, reportedly worse with inspiration and presented to the ED where he was found to have a LLL cavitary lesion.  An AFB smear was positive for TB and MRSA, but TB/MAC PCR was negative. He was discharged with augmentin for 30 days because also growing gram negative rods. He was progressing at home until 10/16 when sharp left sided chest pain and shortness of breath prompted return to the ED at the OSH. Here, he was found to have a PTX which was thought to be 2/2 to his cavitary lesion. A chest tube was placed at that time with resolution of his symptoms; however, throughout his stay he has three recurrences of pneumothoraces requiring additional chest tubes for a total of 3 and return to suction. He reports large upper chest tube was placed most recently prior to his transfer here for second opinion. He reports his symptoms typically resolve with tube placement but recur whenever taken off suction. He denies any travel history or sick contacts that he knows of. He denies fevers, chills, n/v/d or cough. Non-smoker.

## 2020-10-24 NOTE — CONSULTS
[facility]  Thoracic Surgery  Consult Note    Patient Name: Reggie An  MRN: [id number]  Code Status: Full Code  Admission Date: 10/23/2020  Hospital Length of Stay: 1 days  Attending Physician: Anson Bustos MD  Primary Care Provider: Primary Doctor No    Patient information was obtained from patient, past medical records and ER records.     Inpatient consult to Cardiothoracic Surgery  Consult performed by: Adelia Huston MD  Consult ordered by: Vidal Gee MD        Subjective:     Principal Problem: Pneumothorax    History of Present Illness: Patient is a 31yo morbidly obese male without any other significant medical history who [place] [facility] [place] for evaluation given recurrent left sided pneumothoraces. Patient reports prior to mid-September he was in his usual state of health. Around 9/20 he developed flu-like symptoms with fatigue, congestion and some shortness of breath. He later experienced appreciable left sided chest pain, reportedly worse with inspiration and presented to the ED where he was found to have a LLL cavitary lesion.  An AFB smear was positive for TB and MRSA, but TB/MAC PCR was negative. He was discharged with augmentin for 30 days because also growing gram negative rods. He was progressing at home until 10/16 when sharp left sided chest pain and shortness of breath prompted return to the ED at the OSH. Here, he was found to have a PTX which was thought to be 2/2 to his cavitary lesion. A chest tube was placed at that time with resolution of his symptoms; however, throughout his stay he has three recurrences of pneumothoraces requiring additional chest tubes for a total of 3 and return to suction. He reports large upper chest tube was placed most recently prior to his transfer here for second opinion. He reports his symptoms typically resolve with tube placement but recur whenever taken off suction. He denies any travel history or sick contacts that  he knows of. He denies fevers, chills, n/v/d or cough. Non-smoker.     No current facility-administered medications on file prior to encounter.      No current outpatient medications on file prior to encounter.       Review of patient's allergies indicates:   Allergen Reactions    Shellfish containing products        History reviewed. No pertinent past medical history.  Past Surgical History:   Procedure Laterality Date    SHOULDER ARTHROSCOPY Left      Family History     Problem Relation (Age of Onset)    Diabetes Mother, Father        Tobacco Use    Smoking status: Never Smoker   Substance and Sexual Activity    Alcohol use: Not Currently    Drug use: Never    Sexual activity: Not Currently     Review of Systems   Constitutional: Negative for chills, fatigue and fever.   HENT: Negative for hearing loss, rhinorrhea and sinus pain.    Eyes: Negative for photophobia and visual disturbance.   Respiratory: Positive for shortness of breath. Negative for cough, choking, chest tightness and wheezing.    Cardiovascular: Positive for chest pain. Negative for palpitations.   Gastrointestinal: Negative for abdominal distention, abdominal pain, diarrhea and nausea.   Genitourinary: Negative for difficulty urinating, dysuria, flank pain and hematuria.   Musculoskeletal: Negative for back pain, joint swelling and myalgias.   Skin: Negative for color change and pallor.   Neurological: Negative for dizziness, facial asymmetry, weakness and headaches.   Psychiatric/Behavioral: Negative for agitation and confusion.     Objective:     Vital Signs (Most Recent):  Temp: 98.3 °F (36.8 °C) (10/24/20 0017)  Pulse: 108 (10/24/20 0017)  Resp: 16 (10/24/20 0608)  BP: 138/61 (10/24/20 0017)  SpO2: 98 % (10/24/20 0017) Vital Signs (24h Range):  Temp:  [98.1 °F (36.7 °C)-98.3 °F (36.8 °C)] 98.3 °F (36.8 °C)  Pulse:  [] 108  Resp:  [16-18] 16  SpO2:  [98 %-99 %] 98 %  BP: (119-138)/(61-82) 138/61     Weight: (!) 198.4 kg (437 lb 6.3  oz)  Body mass index is 56.16 kg/m².    Physical Exam  Vitals signs reviewed.   Constitutional:       General: He is not in acute distress.     Appearance: Normal appearance. He is obese. He is not ill-appearing.   HENT:      Head: Normocephalic and atraumatic.      Right Ear: External ear normal.      Left Ear: External ear normal.      Nose: Nose normal.      Mouth/Throat:      Pharynx: Oropharynx is clear.   Eyes:      Extraocular Movements: Extraocular movements intact.      Conjunctiva/sclera: Conjunctivae normal.      Pupils: Pupils are equal, round, and reactive to light.   Neck:      Musculoskeletal: Normal range of motion. No neck rigidity or muscular tenderness.   Cardiovascular:      Rate and Rhythm: Normal rate and regular rhythm.      Heart sounds: No murmur. No gallop.    Pulmonary:      Effort: Pulmonary effort is normal. No respiratory distress.      Breath sounds: No wheezing or rhonchi.      Comments: Two test tubes placed left upper chest  Upper chest tube with serosanguinous output  Lower pigtail catheter with thin serous output, + air leak  Abdominal:      General: There is no distension.      Palpations: Abdomen is soft.      Tenderness: There is no abdominal tenderness. There is no guarding.   Musculoskeletal:         General: No swelling, tenderness or deformity.      Right lower leg: No edema.      Left lower leg: No edema.   Skin:     General: Skin is warm and dry.      Coloration: Skin is not jaundiced.   Neurological:      General: No focal deficit present.      Mental Status: He is alert and oriented to person, place, and time. Mental status is at baseline.      Motor: No weakness.         Significant Labs:  CBC:   Recent Labs   Lab 10/24/20  0405   WBC 9.45   RBC 4.07*   HGB 10.8*   HCT 37.0*      MCV 91   MCH 26.5*   MCHC 29.2*     CMP:   Recent Labs   Lab 10/24/20  0404 10/24/20  0405     --    CALCIUM 9.3  --    ALBUMIN 3.3*  --    PROT 8.3  --    *  --    K 4.4   --    CO2 27  --      --    BUN 11  --    CREATININE 0.8 0.8   ALKPHOS 63  --    ALT 17  --    AST 16  --    BILITOT 0.4  --        Significant Diagnostics:  I have reviewed all pertinent imaging results/findings within the past 24 hours.   CXR with small residual apical PTX    Assessment/Plan:     * Pneumothorax  31yo morbidly obese male without any other significant medical history who [place] [facility] [place] for evaluation given recurrent left sided pneumothoraces. Thoracic surgery consulted for evaluation and potential surgical intervention    - Patient seen and examined, labs and imaging reviewed, discussed with staff  - Recommend placing chest tubes to suction given persistent PTX on imaging and air leak on clinical exam  - Please repeat CT Chest given lack of available imaging and likely at least a week time interval since last scan at OSH  - Thoracic surgery will review the imaging and determine if surgical intervention is warranted  - Currently no need for urgent/emergent intervention; please call with any changes in clinical status, questions or concerns.       VTE Risk Mitigation (From admission, onward)         Ordered     enoxaparin injection 40 mg  Every 12 hours      10/24/20 0049     IP VTE LOW RISK PATIENT  Once      10/23/20 2321     Place sequential compression device  Until discontinued      10/23/20 2321                Thank you for your consult. I will follow-up with patient. Please contact us if you have any additional questions.    Adelia Huston MD  General Surgery  [facility]

## 2020-10-24 NOTE — HPI
Mr. An is a 30 y.o. M with morbid obesity presenting of left sided chest pain and SOB since 10/16. He was transferred here from [facility] for a second opinion. Around the week prior to 9/20, patient reported flu like symptoms such as fatigue, some nasal congestion and SOB. He said he started to feel better and his cold symptoms were improving but Around 9/20 he developed significant left sided chest pain, worse with inspiration. He went to the ER and was found to have a LLL cavitary lesion. An AFB smear was positive for TB and MRSA, but TB/MAC PCR was negative. He was discharged with augmentin for 30 days because also growing gram negative rods. About another week goes by and his left sided chest pain and SOB comes back. He goes back to [facility] ER on 10/16 and was found to have a pneumothroax likely secondary to his cavitary lesion. A chest tube was placed with improvement of symptoms. However, during his stay he had intermittent episodes of chest pain and sob and repeat imaging showed recurring pneumothorax. Another chest tube was placed and he was transferred to [facility] for a second opinion regarding recurrent pneumothorax. He states that when ever his chest tube is clamped he develops symptoms. He denies any travel history or sick contacts that he knows of. He denies fevers, chills, n/v/d or cough.

## 2020-10-25 LAB
ALBUMIN SERPL BCP-MCNC: 3 G/DL (ref 3.5–5.2)
ALP SERPL-CCNC: 59 U/L (ref 55–135)
ALT SERPL W/O P-5'-P-CCNC: 13 U/L (ref 10–44)
ANION GAP SERPL CALC-SCNC: 10 MMOL/L (ref 8–16)
AST SERPL-CCNC: 12 U/L (ref 10–40)
BASOPHILS # BLD AUTO: 0.02 K/UL (ref 0–0.2)
BASOPHILS NFR BLD: 0.2 % (ref 0–1.9)
BILIRUB SERPL-MCNC: 0.5 MG/DL (ref 0.1–1)
BSA FOR ECHO PROCEDURE: 3.22 M2
BUN SERPL-MCNC: 11 MG/DL (ref 6–20)
CALCIUM SERPL-MCNC: 9 MG/DL (ref 8.7–10.5)
CHLORIDE SERPL-SCNC: 101 MMOL/L (ref 95–110)
CO2 SERPL-SCNC: 26 MMOL/L (ref 23–29)
CREAT SERPL-MCNC: 0.8 MG/DL (ref 0.5–1.4)
DIFFERENTIAL METHOD: ABNORMAL
EOSINOPHIL # BLD AUTO: 0.2 K/UL (ref 0–0.5)
EOSINOPHIL NFR BLD: 2.2 % (ref 0–8)
ERYTHROCYTE [DISTWIDTH] IN BLOOD BY AUTOMATED COUNT: 14.2 % (ref 11.5–14.5)
EST. GFR  (AFRICAN AMERICAN): >60 ML/MIN/1.73 M^2
EST. GFR  (NON AFRICAN AMERICAN): >60 ML/MIN/1.73 M^2
GLUCOSE SERPL-MCNC: 93 MG/DL (ref 70–110)
HCT VFR BLD AUTO: 34.5 % (ref 40–54)
HGB BLD-MCNC: 10.5 G/DL (ref 14–18)
IMM GRANULOCYTES # BLD AUTO: 0.02 K/UL (ref 0–0.04)
IMM GRANULOCYTES NFR BLD AUTO: 0.2 % (ref 0–0.5)
LYMPHOCYTES # BLD AUTO: 2.6 K/UL (ref 1–4.8)
LYMPHOCYTES NFR BLD: 29.5 % (ref 18–48)
MAGNESIUM SERPL-MCNC: 1.9 MG/DL (ref 1.6–2.6)
MCH RBC QN AUTO: 26.9 PG (ref 27–31)
MCHC RBC AUTO-ENTMCNC: 30.4 G/DL (ref 32–36)
MCV RBC AUTO: 88 FL (ref 82–98)
MONOCYTES # BLD AUTO: 1 K/UL (ref 0.3–1)
MONOCYTES NFR BLD: 10.7 % (ref 4–15)
NEUTROPHILS # BLD AUTO: 5.1 K/UL (ref 1.8–7.7)
NEUTROPHILS NFR BLD: 57.2 % (ref 38–73)
NRBC BLD-RTO: 0 /100 WBC
PHOSPHATE SERPL-MCNC: 4.6 MG/DL (ref 2.7–4.5)
PLATELET # BLD AUTO: 331 K/UL (ref 150–350)
PMV BLD AUTO: 10.4 FL (ref 9.2–12.9)
POTASSIUM SERPL-SCNC: 4.1 MMOL/L (ref 3.5–5.1)
PROT SERPL-MCNC: 7.7 G/DL (ref 6–8.4)
RBC # BLD AUTO: 3.91 M/UL (ref 4.6–6.2)
SODIUM SERPL-SCNC: 137 MMOL/L (ref 136–145)
WBC # BLD AUTO: 8.95 K/UL (ref 3.9–12.7)

## 2020-10-25 PROCEDURE — 84100 ASSAY OF PHOSPHORUS: CPT

## 2020-10-25 PROCEDURE — 85025 COMPLETE CBC W/AUTO DIFF WBC: CPT

## 2020-10-25 PROCEDURE — 11000001 HC ACUTE MED/SURG PRIVATE ROOM

## 2020-10-25 PROCEDURE — 99232 SBSQ HOSP IP/OBS MODERATE 35: CPT | Mod: ,,, | Performed by: INTERNAL MEDICINE

## 2020-10-25 PROCEDURE — 25000003 PHARM REV CODE 250: Performed by: STUDENT IN AN ORGANIZED HEALTH CARE EDUCATION/TRAINING PROGRAM

## 2020-10-25 PROCEDURE — 80053 COMPREHEN METABOLIC PANEL: CPT

## 2020-10-25 PROCEDURE — 99232 PR SUBSEQUENT HOSPITAL CARE,LEVL II: ICD-10-PCS | Mod: ,,, | Performed by: INTERNAL MEDICINE

## 2020-10-25 PROCEDURE — 99231 SBSQ HOSP IP/OBS SF/LOW 25: CPT | Mod: ,,, | Performed by: THORACIC SURGERY (CARDIOTHORACIC VASCULAR SURGERY)

## 2020-10-25 PROCEDURE — 36415 COLL VENOUS BLD VENIPUNCTURE: CPT

## 2020-10-25 PROCEDURE — 63600175 PHARM REV CODE 636 W HCPCS: Performed by: STUDENT IN AN ORGANIZED HEALTH CARE EDUCATION/TRAINING PROGRAM

## 2020-10-25 PROCEDURE — 83735 ASSAY OF MAGNESIUM: CPT

## 2020-10-25 PROCEDURE — 99231 PR SUBSEQUENT HOSPITAL CARE,LEVL I: ICD-10-PCS | Mod: ,,, | Performed by: THORACIC SURGERY (CARDIOTHORACIC VASCULAR SURGERY)

## 2020-10-25 RX ADMIN — FERROUS SULFATE TAB EC 325 MG (65 MG FE EQUIVALENT) 325 MG: 325 (65 FE) TABLET DELAYED RESPONSE at 08:10

## 2020-10-25 RX ADMIN — MORPHINE SULFATE 2 MG: 2 INJECTION, SOLUTION INTRAMUSCULAR; INTRAVENOUS at 08:10

## 2020-10-25 RX ADMIN — MORPHINE SULFATE 2 MG: 2 INJECTION, SOLUTION INTRAMUSCULAR; INTRAVENOUS at 04:10

## 2020-10-25 RX ADMIN — ENOXAPARIN SODIUM 40 MG: 40 INJECTION SUBCUTANEOUS at 08:10

## 2020-10-25 RX ADMIN — FOLIC ACID-PYRIDOXINE-CYANOCOBALAMIN TAB 2.5-25-2 MG 1 TABLET: 2.5-25-2 TAB at 08:10

## 2020-10-25 RX ADMIN — SENNOSIDES 8.6 MG: 8.6 TABLET, FILM COATED ORAL at 08:10

## 2020-10-25 RX ADMIN — MORPHINE SULFATE 2 MG: 2 INJECTION, SOLUTION INTRAMUSCULAR; INTRAVENOUS at 05:10

## 2020-10-25 RX ADMIN — ACETAMINOPHEN 650 MG: 325 TABLET ORAL at 08:10

## 2020-10-25 NOTE — PROGRESS NOTES
[facility] Hospital Medicine  Progress Note    Patient Name: Reggie An  MRN: [id number]  Patient Class: IP- Inpatient   Admission Date: 10/23/2020  Length of Stay: 2 days  Attending Physician: Anson Bustos MD  Primary Care Provider: Primary Doctor [facility] Medicine Team: [facility] HOSP MED 2 Anson Mendoza MD    Subjective:     Principal Problem:Spontaneous tension pneumothorax        HPI:  Mr. An is a 30 y.o. M with morbid obesity presenting of left sided chest pain and SOB since 10/16. He was transferred here from [facility] for a second opinion. Around the week prior to 9/20, patient reported flu like symptoms such as fatigue, some nasal congestion and SOB. He said he started to feel better and his cold symptoms were improving but Around 9/20 he developed significant left sided chest pain, worse with inspiration. He went to the ER and was found to have a LLL cavitary lesion. An AFB smear was positive for TB and MRSA, but TB/MAC PCR was negative. He was discharged with augmentin for 30 days because also growing gram negative rods. About another week goes by and his left sided chest pain and SOB comes back. He goes back to [facility] ER on 10/16 and was found to have a pneumothroax likely secondary to his cavitary lesion. A chest tube was placed with improvement of symptoms. However, during his stay he had intermittent episodes of chest pain and sob and repeat imaging showed recurring pneumothorax. Another chest tube was placed and he was transferred to [facility] for a second opinion regarding recurrent pneumothorax. He states that when ever his chest tube is clamped he develops symptoms. He denies any travel history or sick contacts that he knows of. He denies fevers, chills, n/v/d or cough.            Overview/Hospital Course:  No notes on file    Interval History: Suction was lost to chest tube overnight but replaced this morning. ID consulted and will monitor patient off abx. Patient denies fevers,  chest pain, and shortness of breath. Will get daily CXR's.    Review of Systems   Constitutional: Negative for chills, fatigue and fever.   HENT: Negative for hearing loss, rhinorrhea and sinus pain.    Eyes: Negative for photophobia and visual disturbance.   Respiratory: Negative for cough, choking, chest tightness, shortness of breath and wheezing.    Cardiovascular: Negative for chest pain and palpitations.   Gastrointestinal: Negative for abdominal distention, abdominal pain, diarrhea and nausea.   Genitourinary: Negative for difficulty urinating, dysuria, flank pain and hematuria.   Musculoskeletal: Negative for back pain, joint swelling and myalgias.   Skin: Negative for color change and pallor.   Neurological: Negative for dizziness, facial asymmetry, weakness and headaches.   Psychiatric/Behavioral: Negative for agitation and confusion.     Objective:     Vital Signs (Most Recent):  Temp: 97.1 °F (36.2 °C) (10/25/20 0803)  Pulse: 104 (10/25/20 0803)  Resp: 16 (10/25/20 0805)  BP: 129/63 (10/25/20 0803)  SpO2: 97 % (10/25/20 0803) Vital Signs (24h Range):  Temp:  [96.9 °F (36.1 °C)-100.1 °F (37.8 °C)] 97.1 °F (36.2 °C)  Pulse:  [104-108] 104  Resp:  [16-20] 16  SpO2:  [91 %-98 %] 97 %  BP: (129-159)/(60-82) 129/63     Weight: (!) 198.4 kg (437 lb 6.3 oz)  Body mass index is 56.16 kg/m².    Intake/Output Summary (Last 24 hours) at 10/25/2020 1122  Last data filed at 10/25/2020 0805  Gross per 24 hour   Intake 740 ml   Output 1000 ml   Net -260 ml      Physical Exam  Vitals signs reviewed.   Constitutional:       General: He is not in acute distress.     Appearance: Normal appearance. He is obese. He is not ill-appearing.   HENT:      Head: Normocephalic and atraumatic.      Right Ear: External ear normal.      Left Ear: External ear normal.      Nose: Nose normal.      Mouth/Throat:      Pharynx: Oropharynx is clear.   Eyes:      Extraocular Movements: Extraocular movements intact.      Conjunctiva/sclera:  Conjunctivae normal.      Pupils: Pupils are equal, round, and reactive to light.   Neck:      Musculoskeletal: Normal range of motion. No neck rigidity or muscular tenderness.   Cardiovascular:      Rate and Rhythm: Normal rate and regular rhythm.      Heart sounds: No murmur. No gallop.    Pulmonary:      Effort: Pulmonary effort is normal. No respiratory distress.      Breath sounds: No wheezing or rhonchi.      Comments: Two test tubes placed left upper chest. 1 pig tail tube and 1 thoracostomy tube. Former draining yellow clear fluid other draining serosanguinous fluid, both to suction  Abdominal:      General: There is no distension.      Palpations: Abdomen is soft.      Tenderness: There is no abdominal tenderness. There is no guarding.   Musculoskeletal:         General: No swelling, tenderness or deformity.      Right lower leg: No edema.      Left lower leg: No edema.   Skin:     General: Skin is warm and dry.      Coloration: Skin is not jaundiced.   Neurological:      General: No focal deficit present.      Mental Status: He is alert and oriented to person, place, and time. Mental status is at baseline.      Motor: No weakness.         Significant Labs: All pertinent labs within the past 24 hours have been reviewed.    Significant Imaging: I have reviewed all pertinent imaging results/findings within the past 24 hours.      Assessment/Plan:      * Spontaneous tension pneumothorax  > Recurrent, currently with 2 (1 pigtail and 1 thoracostomy tube) chest tubes after initial one failed. Initially tension pneumothorax presumably due to his infectious cavitary lesion. Here for second opinion. Patient develops symptoms after clamped  > On 10/20, CT showed large left pneumothroax with mediastinal shift to right and large volume subcutaneous emphysema in left chest wall. Subsequent CXR showed adequate expansion after Chest tube adjusment.   > [place] [facility] [place]'s small apical left sided  pneumothorax    Likely recurrent pneumothorax due to his cavitary lesion.     - Chest tubes placed to suction, drains on side of bed  - Consult thoracic surgery for recommendations   - Pain regimen added   - Supplemental O2 as needed  - Daily CXR's        Cavitary lesion of lung  > Sputum cultures at [facility] MRSA+, + 1/3 AFB cultures, TB/MAC PCR negative.   > on 9/21 at [facility], CT chest demonstrated a 5.7 x 4.4 cm left lower lobe cavitary lesion. Was initially treated at [facility] with augmentin after growing gram negative rods on cultures, however was also growing MRSA. Today he was started on PO clindamycin at [facility] today  > on 10/20 CT surgery was consulted for wedge vs lobectomy, but no surgical intervention was done due to no significant blebs and likely infectious etiology.     - Will consult ID for further recommendations  - Chest tube drainage fluid cultures, blood cultures and respiratory cultures will be of limited value at this point since patient has been treated with antibiotics for past month; however, will redraw drainage fluid cultures for now.   - [facility] did draw tube drainage bacterial, fungal and AFB cultures--will need records  - Daily CXR's  - Will monitor patient off abx      Folate deficiency anemia  - continue PO Folate      Iron deficiency anemia  - continue PO Iron       Morbid obesity with body mass index of 50.0-59.9 in adult  - diabetic diet      VTE Risk Mitigation (From admission, onward)         Ordered     enoxaparin injection 40 mg  Every 12 hours      10/24/20 0049     IP VTE LOW RISK PATIENT  Once      10/23/20 2321     Place sequential compression device  Until discontinued      10/23/20 2321                Discharge Planning   RAMESH: 10/27/2020     Code Status: Full Code   Is the patient medically ready for discharge?:     Reason for patient still in hospital (select all that apply): Treatment and Consult recommendations                     Anson Mendoza MD  Department of Hospital  Medicine   [facility]

## 2020-10-25 NOTE — ASSESSMENT & PLAN NOTE
31yo morbidly obese male without any other significant medical history who presented to [facility] as transfer from OSH for evaluation given recurrent left sided pneumothoraces. Thoracic surgery consulted for evaluation and potential surgical intervention     - Repeat CT chest here suboptimal to evaluate left lung secondary to compression from pneumothorax. However, there is no clear evidence of reported cavitary lesion within the left lower lobe. Would ideally like to see scans from [facility] prior to planning any surgical intervention. Discussed with the patient - he will have his mother  CD with images from [facility] tomorrow.   - Keep both chest tubes to suction   - Daily CXR

## 2020-10-25 NOTE — SUBJECTIVE & OBJECTIVE
Interval History: Suction was lost to chest tube overnight but replaced this morning. ID consulted and will monitor patient off abx. Patient denies fevers, chest pain, and shortness of breath. Will get daily CXR's.    Review of Systems   Constitutional: Negative for chills, fatigue and fever.   HENT: Negative for hearing loss, rhinorrhea and sinus pain.    Eyes: Negative for photophobia and visual disturbance.   Respiratory: Negative for cough, choking, chest tightness, shortness of breath and wheezing.    Cardiovascular: Negative for chest pain and palpitations.   Gastrointestinal: Negative for abdominal distention, abdominal pain, diarrhea and nausea.   Genitourinary: Negative for difficulty urinating, dysuria, flank pain and hematuria.   Musculoskeletal: Negative for back pain, joint swelling and myalgias.   Skin: Negative for color change and pallor.   Neurological: Negative for dizziness, facial asymmetry, weakness and headaches.   Psychiatric/Behavioral: Negative for agitation and confusion.     Objective:     Vital Signs (Most Recent):  Temp: 97.1 °F (36.2 °C) (10/25/20 0803)  Pulse: 104 (10/25/20 0803)  Resp: 16 (10/25/20 0805)  BP: 129/63 (10/25/20 0803)  SpO2: 97 % (10/25/20 0803) Vital Signs (24h Range):  Temp:  [96.9 °F (36.1 °C)-100.1 °F (37.8 °C)] 97.1 °F (36.2 °C)  Pulse:  [104-108] 104  Resp:  [16-20] 16  SpO2:  [91 %-98 %] 97 %  BP: (129-159)/(60-82) 129/63     Weight: (!) 198.4 kg (437 lb 6.3 oz)  Body mass index is 56.16 kg/m².    Intake/Output Summary (Last 24 hours) at 10/25/2020 1122  Last data filed at 10/25/2020 0805  Gross per 24 hour   Intake 740 ml   Output 1000 ml   Net -260 ml      Physical Exam  Vitals signs reviewed.   Constitutional:       General: He is not in acute distress.     Appearance: Normal appearance. He is obese. He is not ill-appearing.   HENT:      Head: Normocephalic and atraumatic.      Right Ear: External ear normal.      Left Ear: External ear normal.      Nose: Nose  normal.      Mouth/Throat:      Pharynx: Oropharynx is clear.   Eyes:      Extraocular Movements: Extraocular movements intact.      Conjunctiva/sclera: Conjunctivae normal.      Pupils: Pupils are equal, round, and reactive to light.   Neck:      Musculoskeletal: Normal range of motion. No neck rigidity or muscular tenderness.   Cardiovascular:      Rate and Rhythm: Normal rate and regular rhythm.      Heart sounds: No murmur. No gallop.    Pulmonary:      Effort: Pulmonary effort is normal. No respiratory distress.      Breath sounds: No wheezing or rhonchi.      Comments: Two test tubes placed left upper chest. 1 pig tail tube and 1 thoracostomy tube. Former draining yellow clear fluid other draining serosanguinous fluid, both to suction  Abdominal:      General: There is no distension.      Palpations: Abdomen is soft.      Tenderness: There is no abdominal tenderness. There is no guarding.   Musculoskeletal:         General: No swelling, tenderness or deformity.      Right lower leg: No edema.      Left lower leg: No edema.   Skin:     General: Skin is warm and dry.      Coloration: Skin is not jaundiced.   Neurological:      General: No focal deficit present.      Mental Status: He is alert and oriented to person, place, and time. Mental status is at baseline.      Motor: No weakness.         Significant Labs: All pertinent labs within the past 24 hours have been reviewed.    Significant Imaging: I have reviewed all pertinent imaging results/findings within the past 24 hours.

## 2020-10-25 NOTE — PLAN OF CARE
Problem: Adult Inpatient Plan of Care  Goal: Plan of Care Review  Outcome: Ongoing, Progressing  Goal: Patient-Specific Goal (Individualization)  Outcome: Ongoing, Progressing  Goal: Absence of Hospital-Acquired Illness or Injury  Outcome: Ongoing, Progressing  Goal: Optimal Comfort and Wellbeing  Outcome: Ongoing, Progressing  Goal: Readiness for Transition of Care  Outcome: Ongoing, Progressing  Goal: Rounds/Family Conference  Outcome: Ongoing, Progressing     Problem: Bariatric Environmental Safety  Goal: Safety Maintained with Care  Outcome: Ongoing, Progressing     Problem: Infection  Goal: Infection Symptom Resolution  Outcome: Ongoing, Progressing

## 2020-10-25 NOTE — ASSESSMENT & PLAN NOTE
> Recurrent, currently with 2 (1 pigtail and 1 thoracostomy tube) chest tubes after initial one failed. Initially tension pneumothorax presumably due to his infectious cavitary lesion. Here for second opinion. Patient develops symptoms after clamped  > On 10/20, CT showed large left pneumothroax with mediastinal shift to right and large volume subcutaneous emphysema in left chest wall. Subsequent CXR showed adequate expansion after Chest tube adjusment.   > Repeat [place]R here at [facility] show's small apical left sided pneumothorax    Likely recurrent pneumothorax due to his cavitary lesion.     - Chest tubes placed to suction, drains on side of bed  - Consult thoracic surgery for recommendations   - Pain regimen added   - Supplemental O2 as needed  - Daily CXR's

## 2020-10-25 NOTE — PROGRESS NOTES
[facility]  Thoracic Surgery  Progress Note    Subjective:     History of Present Illness:  No notes on file    Post-Op Info:  * No surgery found *         Interval History: Developed large left sided pneumothorax while off suction for CT scan yesterday. Resolved on CXR this morning    Medications:  Continuous Infusions:  Scheduled Meds:   enoxaparin  40 mg Subcutaneous Q12H    ferrous sulfate  325 mg Oral Daily    folic acid-vit B6-vit B12 2.5-25-2 mg  1 tablet Oral Daily    senna  8.6 mg Oral Daily     PRN Meds:acetaminophen, dextrose 50%, dextrose 50%, glucagon (human recombinant), glucose, glucose, morphine, sodium chloride 0.9%     Review of patient's allergies indicates:   Allergen Reactions    Shellfish containing products      Objective:     Vital Signs (Most Recent):  Temp: 97.1 °F (36.2 °C) (10/25/20 0803)  Pulse: 104 (10/25/20 0803)  Resp: 16 (10/25/20 0805)  BP: 129/63 (10/25/20 0803)  SpO2: 97 % (10/25/20 0803) Vital Signs (24h Range):  Temp:  [96.9 °F (36.1 °C)-100.1 °F (37.8 °C)] 97.1 °F (36.2 °C)  Pulse:  [104-113] 104  Resp:  [16-20] 16  SpO2:  [91 %-98 %] 97 %  BP: (129-159)/(60-83) 129/63     Intake/Output - Last 3 Shifts       10/23 0700 - 10/24 0659 10/24 0700 - 10/25 0659 10/25 0700 - 10/26 0659    P.O.  740     Total Intake(mL/kg)  740 (3.7)     Urine (mL/kg/hr)  1250 (0.3) 500 (1)    Stool  0     Total Output  1250 500    Net  -510 -500           Urine Occurrence  3 x     Stool Occurrence  1 x           SpO2: 97 %  O2 Device (Oxygen Therapy): room air    Physical Exam  Vitals signs reviewed.   Constitutional:       General: He is not in acute distress.     Appearance: Normal appearance. He is obese. He is not ill-appearing.   HENT:      Head: Normocephalic and atraumatic.      Right Ear: External ear normal.      Left Ear: External ear normal.      Nose: Nose normal.      Mouth/Throat:      Pharynx: Oropharynx is clear.   Eyes:      Extraocular Movements: Extraocular  movements intact.      Conjunctiva/sclera: Conjunctivae normal.      Pupils: Pupils are equal, round, and reactive to light.   Neck:      Musculoskeletal: Normal range of motion. No neck rigidity or muscular tenderness.   Cardiovascular:      Rate and Rhythm: Normal rate and regular rhythm.      Heart sounds: No murmur. No gallop.    Pulmonary:      Effort: Pulmonary effort is normal. No respiratory distress.      Breath sounds: No wheezing or rhonchi.      Comments: Two test tubes placed left upper chest  Upper chest tube with serosanguinous output  Lower pigtail catheter with thin serous output, + air leak  Abdominal:      General: There is no distension.      Palpations: Abdomen is soft.      Tenderness: There is no abdominal tenderness. There is no guarding.   Musculoskeletal:         General: No swelling, tenderness or deformity.      Right lower leg: No edema.      Left lower leg: No edema.   Skin:     General: Skin is warm and dry.      Coloration: Skin is not jaundiced.   Neurological:      General: No focal deficit present.      Mental Status: He is alert and oriented to person, place, and time. Mental status is at baseline.      Motor: No weakness.         Significant Labs:  ABGs: No results for input(s): PH, PCO2, PO2, HCO3, POCSATURATED, BE in the last 48 hours.  Amylase: No results for input(s): AMYLASE in the last 48 hours.  BMP:   Recent Labs   Lab 10/25/20  0413   GLU 93      K 4.1      CO2 26   BUN 11   CREATININE 0.8   CALCIUM 9.0   MG 1.9     Cardiac markers: No results for input(s): CKMB, CPKMB, TROPONINT, TROPONINI, MYOGLOBIN in the last 48 hours.  CBC:   Recent Labs   Lab 10/25/20  0413   WBC 8.95   RBC 3.91*   HGB 10.5*   HCT 34.5*      MCV 88   MCH 26.9*   MCHC 30.4*     CMP:   Recent Labs   Lab 10/25/20  0413   GLU 93   CALCIUM 9.0   ALBUMIN 3.0*   PROT 7.7      K 4.1   CO2 26      BUN 11   CREATININE 0.8   ALKPHOS 59   ALT 13   AST 12   BILITOT 0.5      Coagulation:   Recent Labs   Lab 10/24/20  0010 10/24/20  0719   INR 4.0* 1.0   APTT 77.2*  --        Significant Diagnostics:  I have reviewed all pertinent imaging results/findings within the past 24 hours.    VTE Risk Mitigation (From admission, onward)         Ordered     enoxaparin injection 40 mg  Every 12 hours      10/24/20 0049     IP VTE LOW RISK PATIENT  Once      10/23/20 2321     Place sequential compression device  Until discontinued      10/23/20 2321              Assessment/Plan:     * Spontaneous tension pneumothorax  29yo morbidly obese male without any other significant medical history who presented to [facility] [place] OSH for evaluation given recurrent left sided pneumothoraces. Thoracic surgery consulted for evaluation and potential surgical intervention     - Repeat CT chest here suboptimal to evaluate left lung secondary to compression from pneumothorax. However, there is no clear evidence of reported cavitary lesion within the left lower lobe. Would ideally like to see scans from [facility] prior to planning any surgical intervention. Discussed with the patient - he will have his mother  CD with images from [facility] tomorrow.   - Keep both chest tubes to suction   - Daily CXR        Edward Wooten MD  Thoracic Surgery  [facility]

## 2020-10-25 NOTE — SUBJECTIVE & OBJECTIVE
Interval History: Developed large left sided pneumothorax while off suction for CT scan yesterday. Resolved on CXR this morning    Medications:  Continuous Infusions:  Scheduled Meds:   enoxaparin  40 mg Subcutaneous Q12H    ferrous sulfate  325 mg Oral Daily    folic acid-vit B6-vit B12 2.5-25-2 mg  1 tablet Oral Daily    senna  8.6 mg Oral Daily     PRN Meds:acetaminophen, dextrose 50%, dextrose 50%, glucagon (human recombinant), glucose, glucose, morphine, sodium chloride 0.9%     Review of patient's allergies indicates:   Allergen Reactions    Shellfish containing products      Objective:     Vital Signs (Most Recent):  Temp: 97.1 °F (36.2 °C) (10/25/20 0803)  Pulse: 104 (10/25/20 0803)  Resp: 16 (10/25/20 0805)  BP: 129/63 (10/25/20 0803)  SpO2: 97 % (10/25/20 0803) Vital Signs (24h Range):  Temp:  [96.9 °F (36.1 °C)-100.1 °F (37.8 °C)] 97.1 °F (36.2 °C)  Pulse:  [104-113] 104  Resp:  [16-20] 16  SpO2:  [91 %-98 %] 97 %  BP: (129-159)/(60-83) 129/63     Intake/Output - Last 3 Shifts       10/23 0700 - 10/24 0659 10/24 0700 - 10/25 0659 10/25 0700 - 10/26 0659    P.O.  740     Total Intake(mL/kg)  740 (3.7)     Urine (mL/kg/hr)  1250 (0.3) 500 (1)    Stool  0     Total Output  1250 500    Net  -510 -500           Urine Occurrence  3 x     Stool Occurrence  1 x           SpO2: 97 %  O2 Device (Oxygen Therapy): room air    Physical Exam  Vitals signs reviewed.   Constitutional:       General: He is not in acute distress.     Appearance: Normal appearance. He is obese. He is not ill-appearing.   HENT:      Head: Normocephalic and atraumatic.      Right Ear: External ear normal.      Left Ear: External ear normal.      Nose: Nose normal.      Mouth/Throat:      Pharynx: Oropharynx is clear.   Eyes:      Extraocular Movements: Extraocular movements intact.      Conjunctiva/sclera: Conjunctivae normal.      Pupils: Pupils are equal, round, and reactive to light.   Neck:      Musculoskeletal: Normal range of  motion. No neck rigidity or muscular tenderness.   Cardiovascular:      Rate and Rhythm: Normal rate and regular rhythm.      Heart sounds: No murmur. No gallop.    Pulmonary:      Effort: Pulmonary effort is normal. No respiratory distress.      Breath sounds: No wheezing or rhonchi.      Comments: Two test tubes placed left upper chest  Upper chest tube with serosanguinous output  Lower pigtail catheter with thin serous output, + air leak  Abdominal:      General: There is no distension.      Palpations: Abdomen is soft.      Tenderness: There is no abdominal tenderness. There is no guarding.   Musculoskeletal:         General: No swelling, tenderness or deformity.      Right lower leg: No edema.      Left lower leg: No edema.   Skin:     General: Skin is warm and dry.      Coloration: Skin is not jaundiced.   Neurological:      General: No focal deficit present.      Mental Status: He is alert and oriented to person, place, and time. Mental status is at baseline.      Motor: No weakness.         Significant Labs:  ABGs: No results for input(s): PH, PCO2, PO2, HCO3, POCSATURATED, BE in the last 48 hours.  Amylase: No results for input(s): AMYLASE in the last 48 hours.  BMP:   Recent Labs   Lab 10/25/20  0413   GLU 93      K 4.1      CO2 26   BUN 11   CREATININE 0.8   CALCIUM 9.0   MG 1.9     Cardiac markers: No results for input(s): CKMB, CPKMB, TROPONINT, TROPONINI, MYOGLOBIN in the last 48 hours.  CBC:   Recent Labs   Lab 10/25/20  0413   WBC 8.95   RBC 3.91*   HGB 10.5*   HCT 34.5*      MCV 88   MCH 26.9*   MCHC 30.4*     CMP:   Recent Labs   Lab 10/25/20  0413   GLU 93   CALCIUM 9.0   ALBUMIN 3.0*   PROT 7.7      K 4.1   CO2 26      BUN 11   CREATININE 0.8   ALKPHOS 59   ALT 13   AST 12   BILITOT 0.5     Coagulation:   Recent Labs   Lab 10/24/20  0010 10/24/20  0719   INR 4.0* 1.0   APTT 77.2*  --        Significant Diagnostics:  I have reviewed all pertinent imaging  results/findings within the past 24 hours.    VTE Risk Mitigation (From admission, onward)         Ordered     enoxaparin injection 40 mg  Every 12 hours      10/24/20 0049     IP VTE LOW RISK PATIENT  Once      10/23/20 2321     Place sequential compression device  Until discontinued      10/23/20 2095

## 2020-10-25 NOTE — PROGRESS NOTES
Pt aaox4, vss; tachycardic. Sats are 95% on RA. Pt denies SOB and chest pain. Complains of pain to his left shoulder stating the pain started after the chest tube broke. Pain meds provided. Pt stated some relief. Chest tube clamped and removed from suction per approval of MD for patient to go to CT. Reconnected once back in room. Will monitor.

## 2020-10-25 NOTE — MEDICAL/APP STUDENT
Progress Note  Hospital Medicine    [place] [facility]  Admit Date: 10/23/2020   Length of Stay:  LOS: 2 days   SUBJECTIVE:   Reason for Admission:  Spontaneous tension pneumothorax    HPI/Interval history:    Patient is a 29 yo man with morbid obesity presenting with L chest pain and SOB since 10/16, transfer from [facility] for second opinion. In late Sept. patient reported flu-like sx--fatigue, nasal congestion, SOB with some improvement, but around 9/20 developed significant L sided chest pain worse with inspiration warranting visit to ED. LLL cavitary lesion was discovered, AFB smear +TB and MRSA, but TB/MAC PCR negative. DC with augmentin for 30d due to additional growth of GNRs.    On 10/16, patient developed L sided chest pain and SOB and went to [facility] ER. Pneumothorax likely 2/2 cavitary lesion that improved with chest tube. Chest pain and SOB recurred during his hospital stay and imaging showed recurrent pneumothorax requiring placement of a second chest tube.      Review of Systems:  Constitutional: no fever or chills  Respiratory: no cough or shortness of breath  Cardiovascular: no chest pain or palpitations  Gastrointestinal: no nausea or vomiting, no abdominal pain or change in bowel habits  Hematologic/Lymphatic: no easy bruising or lymphadenopathy     OBJECTIVE:     Temp:  [96.9 °F (36.1 °C)-100.1 °F (37.8 °C)]   Pulse:  [104-108]   Resp:  [16-20]   BP: (129-159)/(60-82)   SpO2:  [91 %-98 %]  Body mass index is 56.16 kg/m².  Intake/[place]ake:  This Shift:  I/O this shift:  In: 200 [P.O.:200]  Out: -     Net I/O past 24h:     Intake/Output Summary (Last 24 hours) at 10/25/2020 2185  Last data filed at 10/25/2020 0805  Gross per 24 hour   Intake 200 ml   Output 1000 ml   Net -800 ml             Physical Exam:  General: well developed, well nourished, no distress, morbidly obese  Lungs:  normal respiratory effort and wheezes LLL and ELLIOT. One pigtail catheter, one chest tube draining well under water  seal.    Cardiovascular: Heart: regular rate and rhythm, S1, S2 normal, no murmur, click, rub or gallop.  Extremities: Homans sign is negative, no sign of DVT.   Abdomen/Rectal: Abdomen: soft, non-tender non-distented; bowel sounds normal; no masses,  no organomegaly.   Neurologic: A&Ox3. Normal strength and tone. No focal numbness or weakness.     Laboratory:  CBC/Anemia Labs: Coags:    Recent Labs   Lab 10/24/20  0405 10/24/20  0719 10/25/20  0413   WBC 9.45  --  8.95   HGB 10.8*  --  10.5*   HCT 37.0*  --  34.5*     --  331   MCV 91  --  88   RDW 14.1  --  14.2   IRON  --  17*  --    FERRITIN  --  217  --    FOLATE  --  3.8*  --    [id number]  --  492  --     Recent Labs   Lab 10/24/20  0010 10/24/20  0719   INR 4.0* 1.0   APTT 77.2*  --         Chemistries:   Recent Labs   Lab 10/24/20  0404 10/24/20  0405 10/25/20  0413   *  --  137   K 4.4  --  4.1     --  101   CO2 27  --  26   BUN 11  --  11   CREATININE 0.8 0.8 0.8   CALCIUM 9.3  --  9.0   PROT 8.3  --  7.7   BILITOT 0.4  --  0.5   ALKPHOS 63  --  59   ALT 17  --  13   AST 16  --  12   MG 1.8  --  1.9   PHOS 4.8*  --  4.6*        Medications:  Scheduled Meds:   enoxaparin  40 mg Subcutaneous Q12H    ferrous sulfate  325 mg Oral Daily    folic acid-vit B6-vit B12 2.5-25-2 mg  1 tablet Oral Daily    senna  8.6 mg Oral Daily                             Continuous Infusions:  PRN Meds:.acetaminophen, dextrose 50%, dextrose 50%, glucagon (human recombinant), glucose, glucose, morphine, sodium chloride 0.9%     ASSESSMENT/PLAN:     Patient is a 31 yo man presenting with recurring, spontaneous tension pneumothorax, cavitary lesion of lung, morbid obesity with BMI 50-59.9, iron deficiency anemia, folate deficiency anemia.    Spontaneous Tension Pneumothorax  -Chest tubes placed to suction, satisfactory location confirmed on CXR and CT.  -CXR this AM confirmed a small, but well improved, trace apical pneumothorax comapred to CT chest taken day  prior    -Daily CXR  -Supplemental O2 prn  -CT surgery consulted for LLL cavitary lesion noted on 9/21 CT. They await [facility] records to determine whether surgical intervention is indicated.     Cavitary Lesion of Lung  -ID consulted, they await [facility] records for review of prior ID workup. As patient has been on Abx for approximately a month, new sputum cx may offer limited insights into ongoing pathology.   -DC Clindamycin that was started at transferring facility on the day prior to admission. If decompensation, start broad spectrum vancomycin, cefepime  -Check AFB sputum x3  -HIV, hepatitis, G6PD, fungal cx, AFB in process  -TTE ordered given past Hx of +MRSA sputum culture    Morbid obesity with BMI 50-59.9  -Patient states that he feels more compelled to start weight loss now. Believes some of his current issues could be attributed to obesity.  -Diabetic diet    Iron deficiency anemia  -Ferrous sultfate EC 325mg po qd  -Daily CBC    Folate deficiency anemia  -Folic acid-vitB6-vitB12 2.5-25-2mg po qd  -Daily CBC      Active Hospital Problems    Diagnosis  POA    *Spontaneous tension pneumothorax [J93.0]  Yes    Iron deficiency anemia [D50.9]  Yes    Folate deficiency anemia [D52.9]  Yes    Morbid obesity with body mass index of 50.0-59.9 in adult [E66.01, Z68.43]  Not Applicable    Cavitary lesion of lung [J98.4]  Yes      Resolved Hospital Problems   No resolved problems to display.       DVT ppx- Enoxaparin 40mg subQ q12h  CODE Status- Full    Dispo- DC pending review of records from [facility], clearance from CT surgery, removal of chest tubes and review by ID.     Clarence Jones, MS-4  Hospital Medicine Team 2

## 2020-10-25 NOTE — ASSESSMENT & PLAN NOTE
> Sputum cultures at [facility] MRSA+, + 1/3 AFB cultures, TB/MAC PCR negative.   > on 9/21 at [facility], CT chest demonstrated a 5.7 x 4.4 cm left lower lobe cavitary lesion. Was initially treated at [facility] with augmentin after growing gram negative rods on cultures, however was also growing MRSA. Today he was started on PO clindamycin at [facility] today  > on 10/20 CT surgery was consulted for wedge vs lobectomy, but no surgical intervention was done due to no significant blebs and likely infectious etiology.     - Will consult ID for further recommendations  - Chest tube drainage fluid cultures, blood cultures and respiratory cultures will be of limited value at this point since patient has been treated with antibiotics for past month; however, will redraw drainage fluid cultures for now.   - [facility] did draw tube drainage bacterial, fungal and AFB cultures--will need records  - Daily CXR's  - Will monitor patient off abx

## 2020-10-26 PROBLEM — D75.A G6PD DEFICIENCY: Status: ACTIVE | Noted: 2020-10-26

## 2020-10-26 LAB
G6PD RBC-CCNT: 2.4 U/G HGB (ref 7–20.5)
HAV IGM SERPL QL IA: NEGATIVE
HBV CORE IGM SERPL QL IA: NEGATIVE
HBV SURFACE AG SERPL QL IA: NEGATIVE
HCV AB SERPL QL IA: NEGATIVE
HIV 1+2 AB+HIV1 P24 AG SERPL QL IA: NEGATIVE

## 2020-10-26 PROCEDURE — 99232 PR SUBSEQUENT HOSPITAL CARE,LEVL II: ICD-10-PCS | Mod: ,,, | Performed by: THORACIC SURGERY (CARDIOTHORACIC VASCULAR SURGERY)

## 2020-10-26 PROCEDURE — 99232 SBSQ HOSP IP/OBS MODERATE 35: CPT | Mod: ,,, | Performed by: THORACIC SURGERY (CARDIOTHORACIC VASCULAR SURGERY)

## 2020-10-26 PROCEDURE — 99233 PR SUBSEQUENT HOSPITAL CARE,LEVL III: ICD-10-PCS | Mod: ,,, | Performed by: INTERNAL MEDICINE

## 2020-10-26 PROCEDURE — 25000003 PHARM REV CODE 250: Performed by: STUDENT IN AN ORGANIZED HEALTH CARE EDUCATION/TRAINING PROGRAM

## 2020-10-26 PROCEDURE — 36415 COLL VENOUS BLD VENIPUNCTURE: CPT

## 2020-10-26 PROCEDURE — 99232 PR SUBSEQUENT HOSPITAL CARE,LEVL II: ICD-10-PCS | Mod: ,,, | Performed by: INTERNAL MEDICINE

## 2020-10-26 PROCEDURE — 11000001 HC ACUTE MED/SURG PRIVATE ROOM

## 2020-10-26 PROCEDURE — 63600175 PHARM REV CODE 636 W HCPCS: Performed by: STUDENT IN AN ORGANIZED HEALTH CARE EDUCATION/TRAINING PROGRAM

## 2020-10-26 PROCEDURE — 99233 SBSQ HOSP IP/OBS HIGH 50: CPT | Mod: ,,, | Performed by: INTERNAL MEDICINE

## 2020-10-26 PROCEDURE — 86480 TB TEST CELL IMMUN MEASURE: CPT

## 2020-10-26 PROCEDURE — 99232 SBSQ HOSP IP/OBS MODERATE 35: CPT | Mod: ,,, | Performed by: INTERNAL MEDICINE

## 2020-10-26 RX ORDER — ALBUTEROL SULFATE 90 UG/1
2 AEROSOL, METERED RESPIRATORY (INHALATION) EVERY 6 HOURS PRN
Status: DISCONTINUED | OUTPATIENT
Start: 2020-10-26 | End: 2020-11-01 | Stop reason: HOSPADM

## 2020-10-26 RX ORDER — IPRATROPIUM BROMIDE AND ALBUTEROL SULFATE 2.5; .5 MG/3ML; MG/3ML
3 SOLUTION RESPIRATORY (INHALATION) EVERY 6 HOURS PRN
Status: DISCONTINUED | OUTPATIENT
Start: 2020-10-26 | End: 2020-10-26

## 2020-10-26 RX ADMIN — SENNOSIDES 8.6 MG: 8.6 TABLET, FILM COATED ORAL at 08:10

## 2020-10-26 RX ADMIN — ACETAMINOPHEN 650 MG: 325 TABLET ORAL at 09:10

## 2020-10-26 RX ADMIN — FERROUS SULFATE TAB EC 325 MG (65 MG FE EQUIVALENT) 325 MG: 325 (65 FE) TABLET DELAYED RESPONSE at 08:10

## 2020-10-26 RX ADMIN — ACETAMINOPHEN 650 MG: 325 TABLET ORAL at 08:10

## 2020-10-26 RX ADMIN — FOLIC ACID-PYRIDOXINE-CYANOCOBALAMIN TAB 2.5-25-2 MG 1 TABLET: 2.5-25-2 TAB at 08:10

## 2020-10-26 RX ADMIN — ENOXAPARIN SODIUM 40 MG: 40 INJECTION SUBCUTANEOUS at 08:10

## 2020-10-26 RX ADMIN — ENOXAPARIN SODIUM 40 MG: 40 INJECTION SUBCUTANEOUS at 09:10

## 2020-10-26 NOTE — HPI
Patient is a 29yo morbidly obese male without any other significant medical history who presented to [facility] as transfer from OSH for evaluation given recurrent left sided pneumothoraces. Patient reports prior to mid-September he was in his usual state of health. Around 9/20 he developed flu-like symptoms with fatigue, congestion and some shortness of breath. He later experienced appreciable left sided chest pain, reportedly worse with inspiration and presented to the ED where he was found to have a LLL cavitary lesion.  An AFB smear was positive for TB and MRSA, but TB/MAC PCR was negative. He was discharged with augmentin for 30 days because also growing gram negative rods. He was progressing at home until 10/16 when sharp left sided chest pain and shortness of breath prompted return to the ED at the OSH. Here, he was found to have a PTX which was thought to be 2/2 to his cavitary lesion. A chest tube was placed at that time with resolution of his symptoms; however, throughout his stay he has three recurrences of pneumothoraces requiring additional chest tubes for a total of 3 and return to suction. He reports large upper chest tube was placed most recently prior to his transfer here for second opinion. He reports his symptoms typically resolve with tube placement but recur whenever taken off suction. He denies any travel history or sick contacts that he knows of. He denies fevers, chills, n/v/d or cough. Non-smoker.

## 2020-10-26 NOTE — ASSESSMENT & PLAN NOTE
> Recurrent, currently with 2 (1 pigtail and 1 thoracostomy tube) chest tubes after initial one failed. Initially tension pneumothorax presumably due to his infectious cavitary lesion. Here for second opinion. Patient develops symptoms after clamped. On 10/20, CT showed large left pneumothroax with mediastinal shift to right and large volume subcutaneous emphysema in left chest wall. Subsequent CXR showed adequate expansion after Chest tube adjusment. [place] [facility] show's small apical left sided pneumothorax due to his cavitary lesion. Daily CXR show unchanged pneumothorax and persistent subcutaneous emphysema. Patient continues to have serosanguinous drainage without any acute changes.     Plan  - Chest tubes placed to suction, drains on side of bed  - Consult thoracic surgery for recommendations   - Pain regimen added   - Supplemental O2 as needed  - PRN albuterol for wheezing  - Daily CXR's

## 2020-10-26 NOTE — PROGRESS NOTES
[facility]  Thoracic Surgery  Progress Note    Subjective:     History of Present Illness:  Patient is a 29yo morbidly obese male without any other significant medical history who presented to [facility] [place] transfer from [facility] for evaluation given recurrent left sided pneumothoraces. Patient reports prior to mid-September he was in his usual state of health. Around 9/20 he developed flu-like symptoms with fatigue, congestion and some shortness of breath. He later experienced appreciable left sided chest pain, reportedly worse with inspiration and presented to the ED where he was found to have a LLL cavitary lesion.  An AFB smear was positive for TB and MRSA, but TB/MAC PCR was negative. He was discharged with augmentin for 30 days because also growing gram negative rods. He was progressing at home until 10/16 when sharp left sided chest pain and shortness of breath prompted return to the ED at the OSH. Here, he was found to have a PTX which was thought to be 2/2 to his cavitary lesion. A chest tube was placed at that time with resolution of his symptoms; however, throughout his stay he has three recurrences of pneumothoraces requiring additional chest tubes for a total of 3 and return to suction. He reports large upper chest tube was placed most recently prior to his transfer here for second opinion. He reports his symptoms typically resolve with tube placement but recur whenever taken off suction. He denies any travel history or sick contacts that he knows of. He denies fevers, chills, n/v/d or cough. Non-smoker.     Post-Op Info:  * No surgery found *         Interval History: NAEON. Hemodynamically stable on room air. Chest tubes to suction.     Medications:  Continuous Infusions:  Scheduled Meds:   enoxaparin  40 mg Subcutaneous Q12H    ferrous sulfate  325 mg Oral Daily    folic acid-vit B6-vit B12 2.5-25-2 mg  1 tablet Oral Daily    senna  8.6 mg Oral Daily     PRN Meds:acetaminophen, dextrose  50%, dextrose 50%, glucagon (human recombinant), glucose, glucose, morphine, sodium chloride 0.9%     Review of patient's allergies indicates:   Allergen Reactions    Shellfish containing products      Objective:     Vital Signs (Most Recent):  Temp: 97.2 °F (36.2 °C) (10/26/20 0439)  Pulse: 107 (10/26/20 0439)  Resp: 18 (10/26/20 0439)  BP: 119/79 (10/26/20 0439)  SpO2: 95 % (10/26/20 0439) Vital Signs (24h Range):  Temp:  [97.1 °F (36.2 °C)-99.4 °F (37.4 °C)] 97.2 °F (36.2 °C)  Pulse:  [] 107  Resp:  [16-20] 18  SpO2:  [93 %-99 %] 95 %  BP: (119-133)/(63-80) 119/79     Intake/Output - Last 3 Shifts       10/24 0700 - 10/25 0659 10/25 0700 - 10/26 0659 10/26 0700 - 10/27 0659    P.O. 740 920     Total Intake(mL/kg) 740 (3.7) 920 (4.6)     Urine (mL/kg/hr) 1250 (0.3) 2350 (0.5)     Stool 0 1     Total Output 1250 2351     Net -510 -1431            Urine Occurrence 3 x      Stool Occurrence 1 x 1 x           SpO2: 95 %  O2 Device (Oxygen Therapy): room air    Physical Exam    Significant Labs:  BMP:   Recent Labs   Lab 10/25/20  0413   GLU 93      K 4.1      CO2 26   BUN 11   CREATININE 0.8   CALCIUM 9.0   MG 1.9     CBC:   Recent Labs   Lab 10/25/20  0413   WBC 8.95   RBC 3.91*   HGB 10.5*   HCT 34.5*      MCV 88   MCH 26.9*   MCHC 30.4*     CMP:   Recent Labs   Lab 10/25/20  0413   GLU 93   CALCIUM 9.0   ALBUMIN 3.0*   PROT 7.7      K 4.1   CO2 26      BUN 11   CREATININE 0.8   ALKPHOS 59   ALT 13   AST 12   BILITOT 0.5       Significant Diagnostics:  CXR: I have reviewed all pertinent results/findings within the past 24 hours    VTE Risk Mitigation (From admission, onward)         Ordered     enoxaparin injection 40 mg  Every 12 hours      10/24/20 0049     IP VTE LOW RISK PATIENT  Once      10/23/20 2321     Place sequential compression device  Until discontinued      10/23/20 2321              Assessment/Plan:     * Spontaneous tension pneumothorax  29yo morbidly obese male  without any other significant medical history who presented to [facility] as transfer from OSH for evaluation given recurrent left sided pneumothoraces. Thoracic surgery consulted for evaluation and potential surgical intervention     - Side hole out of chest this morning. Will assess tube exit site this morning.   - Repeat CT chest here suboptimal to evaluate left lung secondary to compression from pneumothorax. However, there is no clear evidence of reported cavitary lesion within the left lower lobe. Would ideally like to see scans from [facility] prior to planning any surgical intervention. Discussed with the patient - he will have his mother  CD with images from [facility].   - Keep both chest tubes to suction   - Daily CXR        FRACISCO Barillas  Thoracic Surgery  [facility]

## 2020-10-26 NOTE — PROGRESS NOTES
[facility] Hospital Medicine  Progress Note    Patient Name: Reggie An  MRN: [id number]  Patient Class: IP- Inpatient   Admission Date: 10/23/2020  Length of Stay: 3 days  Attending Physician: Anson Bustos MD  Primary Care Provider: Primary Doctor [facility] Medicine Team: [facility] [place] [facility] HOSP MED 2 Ritesh Novoa MD    Subjective:     Principal Problem:Spontaneous tension pneumothorax        HPI:  Mr. An is a 30 y.o. M with morbid obesity presenting of left sided chest pain and SOB since 10/16. He was transferred here from [facility] for a second opinion. Around the week prior to 9/20, patient reported flu like symptoms such as fatigue, some nasal congestion and SOB. He said he started to feel better and his cold symptoms were improving but Around 9/20 he developed significant left sided chest pain, worse with inspiration. He went to the ER and was found to have a LLL cavitary lesion. An AFB smear was positive for TB and MRSA, but TB/MAC PCR was negative. He was discharged with augmentin for 30 days because also growing gram negative rods. About another week goes by and his left sided chest pain and SOB comes back. He goes back to [facility] ER on 10/16 and was found to have a pneumothroax likely secondary to his cavitary lesion. A chest tube was placed with improvement of symptoms. However, during his stay he had intermittent episodes of chest pain and sob and repeat imaging showed recurring pneumothorax. Another chest tube was placed and he was transferred to [facility] [place] [facility] for a second opinion regarding recurrent pneumothorax. He states that when ever his chest tube is clamped he develops symptoms. He denies any travel history or sick contacts that he knows of. He denies fevers, chills, n/v/d or cough.            Overview/Hospital Course:  No notes on file    Interval History: NAEON. Patient reports one episode of coughing and wheezing for 30 minutes that self resolved. Patient denies any continuing CP or SOB  as well as constitutional symptoms, cardiac, GI or  complaints. Patient records from [facility] were not received.     Review of Systems   Constitutional: Negative for chills, fatigue and fever.   HENT: Negative for hearing loss, rhinorrhea and sinus pain.    Eyes: Negative for photophobia and visual disturbance.   Respiratory: Positive for cough. Negative for choking, chest tightness, shortness of breath and wheezing.    Cardiovascular: Negative for chest pain and palpitations.   Gastrointestinal: Negative for abdominal distention, abdominal pain, diarrhea and nausea.   Genitourinary: Negative for difficulty urinating, dysuria, flank pain and hematuria.   Musculoskeletal: Negative for back pain, joint swelling and myalgias.   Skin: Negative for color change and pallor.   Neurological: Negative for dizziness, facial asymmetry, weakness and headaches.   Psychiatric/Behavioral: Negative for agitation and confusion.     Objective:     Vital Signs (Most Recent):  Temp: 96.9 °F (36.1 °C) (10/26/20 1110)  Pulse: (!) 111 (10/26/20 1110)  Resp: 16 (10/26/20 1110)  BP: 131/67 (10/26/20 1110)  SpO2: (!) 93 % (10/26/20 1110) Vital Signs (24h Range):  Temp:  [96.9 °F (36.1 °C)-99.4 °F (37.4 °C)] 96.9 °F (36.1 °C)  Pulse:  [104-113] 111  Resp:  [16-20] 16  SpO2:  [93 %-99 %] 93 %  BP: (119-142)/(63-80) 131/67     Weight: (!) 198.2 kg (437 lb)  Body mass index is 56.11 kg/m².    Intake/Output Summary (Last 24 hours) at 10/26/2020 1516  Last data filed at 10/26/2020 0812  Gross per 24 hour   Intake 200 ml   Output 1650 ml   Net -1450 ml      Physical Exam  Vitals signs and nursing note reviewed.   Constitutional:       General: He is not in acute distress.     Appearance: Normal appearance. He is obese. He is not ill-appearing.   HENT:      Head: Normocephalic and atraumatic.      Right Ear: External ear normal.      Left Ear: External ear normal.      Nose: Nose normal.      Mouth/Throat:      Pharynx: Oropharynx is clear.   Eyes:       Extraocular Movements: Extraocular movements intact.      Conjunctiva/sclera: Conjunctivae normal.      Pupils: Pupils are equal, round, and reactive to light.   Neck:      Musculoskeletal: Normal range of motion. No neck rigidity or muscular tenderness.   Cardiovascular:      Rate and Rhythm: Normal rate and regular rhythm.      Heart sounds: No murmur. No gallop.    Pulmonary:      Effort: Pulmonary effort is normal. No respiratory distress.      Breath sounds: No wheezing or rhonchi.      Comments: Two test tubes placed left upper chest. 1 pig tail tube and 1 thoracostomy tube. Former draining yellow clear fluid other draining serosanguinous fluid, both to suction  Abdominal:      General: There is no distension.      Palpations: Abdomen is soft.      Tenderness: There is no abdominal tenderness. There is no guarding.   Musculoskeletal:         General: No swelling, tenderness or deformity.      Right lower leg: No edema.      Left lower leg: No edema.   Skin:     General: Skin is warm and dry.      Coloration: Skin is not jaundiced.   Neurological:      General: No focal deficit present.      Mental Status: He is alert and oriented to person, place, and time. Mental status is at baseline.      Motor: No weakness.         Significant Labs:   CBC:   Recent Labs   Lab 10/25/20  0413   WBC 8.95   HGB 10.5*   HCT 34.5*        CMP:   Recent Labs   Lab 10/25/20  0413      K 4.1      CO2 26   GLU 93   BUN 11   CREATININE 0.8   CALCIUM 9.0   PROT 7.7   ALBUMIN 3.0*   BILITOT 0.5   ALKPHOS 59   AST 12   ALT 13   ANIONGAP 10   EGFRNONAA >60.0     Magnesium:   Recent Labs   Lab 10/25/20  0413   MG 1.9       Significant Imaging: I have reviewed all pertinent imaging results/findings within the past 24 hours.      Assessment/Plan:      * Spontaneous tension pneumothorax  > Recurrent, currently with 2 (1 pigtail and 1 thoracostomy tube) chest tubes after initial one failed. Initially tension  pneumothorax presumably due to his infectious cavitary lesion. Here for second opinion. Patient develops symptoms after clamped. On 10/20, CT showed large left pneumothroax with mediastinal shift to right and large volume subcutaneous emphysema in left chest wall. Subsequent CXR showed adequate expansion after Chest tube adjusment. Repeat CXR here at [facility] [place] [place] show's small apical left sided pneumothorax due to his cavitary lesion. Daily CXR show unchanged pneumothorax and persistent subcutaneous emphysema. Patient continues to have serosanguinous drainage without any acute changes.     Plan  - Chest tubes placed to suction, drains on side of bed  - Consult thoracic surgery for recommendations   - Pain regimen added   - Supplemental O2 as needed  - PRN albuterol for wheezing  - Daily CXR's        G6PD deficiency  Patient found to have G6PD level of 2.4 with normal value of 7-25. Patient was tested based on possible treatment with oxidizing agent for TB or cavitary lesions.     Plan  - Avoid oxidizing agents and monitor for signs of worsening anemia    Folate deficiency anemia  - continue PO Folate      Iron deficiency anemia  - continue PO Iron       Morbid obesity with body mass index of 50.0-59.9 in adult  - diabetic diet      Cavitary lesion of lung  Sputum cultures at [facility] MRSA+, + 1/3 AFB cultures, TB/MAC PCR negative.   9/21 at [facility], CT chest demonstrated a 5.7 x 4.4 cm left lower lobe cavitary lesion. Was initially treated at [facility] with augmentin after growing gram negative rods on cultures, however was also growing MRSA. Today he was started on PO clindamycin at [facility] today. On 10/20 CT surgery was consulted for wedge vs lobectomy, but no surgical intervention was done due to no significant blebs and likely infectious etiology.     - Will consult ID for further recommendations  - Chest tube drainage fluid cultures, blood cultures and respiratory cultures will be of limited value at this point since patient  has been treated with antibiotics for past month; however, will redraw drainage fluid cultures for now.   - Anna did draw tube drainage bacterial, fungal and AFB cultures--will need records  - Daily CXR's  - Will monitor patient off abx        VTE Risk Mitigation (From admission, onward)         Ordered     enoxaparin injection 40 mg  Every 12 hours      10/24/20 0049     IP VTE LOW RISK PATIENT  Once      10/23/20 2321     Place sequential compression device  Until discontinued      10/23/20 2321                Discharge Planning   RAMESH: 10/30/2020     Code Status: Full Code   Is the patient medically ready for discharge?:     Reason for patient still in hospital (select all that apply): Patient trending condition, Treatment and Consult recommendations                     Ritesh Novoa MD  Department of Hospital Medicine   [facility]

## 2020-10-26 NOTE — ASSESSMENT & PLAN NOTE
29yo morbidly obese male without any other significant medical history who presented to [facility] as transfer from OSH for evaluation given recurrent left sided pneumothoraces. Thoracic surgery consulted for evaluation and potential surgical intervention     - Side hole out of chest this morning. Will assess tube exit site this morning.   - Repeat CT chest here suboptimal to evaluate left lung secondary to compression from pneumothorax. However, there is no clear evidence of reported cavitary lesion within the left lower lobe. Would ideally like to see scans from [facility] prior to planning any surgical intervention. Discussed with the patient - he will have his mother  CD with images from [facility].   - Keep both chest tubes to suction   - Daily CXR

## 2020-10-26 NOTE — ASSESSMENT & PLAN NOTE
Patient found to have G6PD level of 2.4 with normal value of 7-25. Patient was tested based on possible treatment with oxidizing agent for TB or cavitary lesions.     Plan  - Avoid oxidizing agents and monitor for signs of worsening anemia

## 2020-10-26 NOTE — MEDICAL/APP STUDENT
[facility]  Consult Note    Patient Name: Reggie An  MRN: [id number]  Admission Date: 10/23/2020  Hospital Length of Stay: 3 days  Attending Physician: Anson Bustos MD  Primary Care Provider: Primary Doctor No      Patient information was obtained from patient, past medical records and ER records.       Inpatient consult to Infectious Diseases  Consult performed by: Isha Up MS4  Consult ordered by: Vidal Gee MD    Subjective:   30 yoM w.o. significant pmhx, presents as transfer for [facility] for second opinion following multiple acute onset of L pneumothoraces requiring multiple chest tube placements.     Hopsital course at [facility] is unclear. Appears that pt initially presented at [facility] 9/30 following SOB, found to have LLL cavitary lesion.   There are reports of sputum culture: positive AFB, MRSA, and grm negative rods. However TB/MAC PCR negative. Pt was discharged on augmentin for 30 days    Pt returned to [facility] 10/16 following increasing SOB and L CP, found to have a pneumothorax likely secondary to LLL cavitary lesion. Chest tube placed and pt had initial improvement of symptoms. However the pt began having increasing SOB and imaging showed recurrent pneumothorax which prompted another chest tube placement.     He denies any recent travel, incarceration, work with the homeless population, or outdoor activities. No known sick contacts, no pets at home    Hospital course at [facility]:   Clindamycin was d/c'd in effort to observe pt off antibiotics  Repeat CT showed  no clear evidence of reported cavitary lesion within the left lower lobe. Thoracic surgery waiting [facility] scans prior to planing any surgical intervention.       Objective:     Vitals:    10/26/20 0026 10/26/20 0439 10/26/20 0816 10/26/20 1110   BP: 127/75 119/79 (!) 142/76 131/67   BP Location:   Right arm Right arm   Patient Position: Lying Lying Sitting Sitting   Pulse: 104 107 108 (!) 111   Resp: 20 18 16 16    Temp: 98.1 °F (36.7 °C) 97.2 °F (36.2 °C) 97 °F (36.1 °C) 96.9 °F (36.1 °C)   TempSrc: Axillary Axillary Oral Oral   SpO2: 96% 95% 98% (!) 93%   Weight:       Height:           Physical Exam  Constitutional:       Appearance: Normal appearance. He is obese.   HENT:      Head: Normocephalic and atraumatic.      Nose: No congestion or rhinorrhea.      Mouth/Throat:      Mouth: Mucous membranes are moist.      Pharynx: Oropharynx is clear.   Eyes:      Extraocular Movements: Extraocular movements intact.      Pupils: Pupils are equal, round, and reactive to light.   Neck:      Musculoskeletal: Normal range of motion and neck supple.   Cardiovascular:      Rate and Rhythm: Normal rate and regular rhythm.      Pulses: Normal pulses.   Pulmonary:      Breath sounds: Normal breath sounds.   Chest:      Chest wall: Tenderness present.   Abdominal:      Palpations: Abdomen is soft.      Tenderness: There is no abdominal tenderness. There is no guarding.   Skin:     General: Skin is warm and dry.   Neurological:      Mental Status: He is alert and oriented to person, place, and time.   Psychiatric:         Mood and Affect: Mood normal.       LABS and IMAGES:     CXR-10-26-20  Small left pneumothorax persists with air along the lateral and medial margins of the left mid and lower lung zones.  Subcutaneous emphysema persists in the left chest wall and at the base of the left neck.     Apart from modest patchy heterogeneous opacity in the retrocardiac region of the left lower lung zone, likely compressive effect from overlying cardiac structures, we detect no pulmonary disease    Assessment/Plan:   30 yoM with no significant medical history presents with recurrent spontaneous pneumothoraces    Recurrent Spontaneous Pneumothoraces   -awaiting [facility] medical records- [facility]  -does not appear to be infectious process   -agree with plan to withhold antibiotics       Active Diagnoses:    Diagnosis Date Noted POA     PRINCIPAL PROBLEM:  Spontaneous tension pneumothorax [J93.0] 10/23/2020 Yes    Iron deficiency anemia [D50.9] 10/24/2020 Yes    Folate deficiency anemia [D52.9] 10/24/2020 Yes    Morbid obesity with body mass index of 50.0-59.9 in adult [E66.01, Z68.43]  Not Applicable    Cavitary lesion of lung [J98.4] 10/23/2020 Yes      Problems Resolved During this Admission:     VTE Risk Mitigation (From admission, onward)         Ordered     enoxaparin injection 40 mg  Every 12 hours      10/24/20 0049     IP VTE LOW RISK PATIENT  Once      10/23/20 2321     Place sequential compression device  Until discontinued      10/23/20 2321                Thank you for your consult. I will follow-up with patient. Please contact us if you have any additional questions.    Taraneh Hadizadeh [facility]

## 2020-10-26 NOTE — SUBJECTIVE & OBJECTIVE
Interval History: NAEON. Patient reports one episode of coughing and wheezing for 30 minutes that self resolved. Patient denies any continuing CP or SOB as well as constitutional symptoms, cardiac, GI or  complaints. Patient records from [facility] were not received.     Review of Systems   Constitutional: Negative for chills, fatigue and fever.   HENT: Negative for hearing loss, rhinorrhea and sinus pain.    Eyes: Negative for photophobia and visual disturbance.   Respiratory: Positive for cough. Negative for choking, chest tightness, shortness of breath and wheezing.    Cardiovascular: Negative for chest pain and palpitations.   Gastrointestinal: Negative for abdominal distention, abdominal pain, diarrhea and nausea.   Genitourinary: Negative for difficulty urinating, dysuria, flank pain and hematuria.   Musculoskeletal: Negative for back pain, joint swelling and myalgias.   Skin: Negative for color change and pallor.   Neurological: Negative for dizziness, facial asymmetry, weakness and headaches.   Psychiatric/Behavioral: Negative for agitation and confusion.     Objective:     Vital Signs (Most Recent):  Temp: 96.9 °F (36.1 °C) (10/26/20 1110)  Pulse: (!) 111 (10/26/20 1110)  Resp: 16 (10/26/20 1110)  BP: 131/67 (10/26/20 1110)  SpO2: (!) 93 % (10/26/20 1110) Vital Signs (24h Range):  Temp:  [96.9 °F (36.1 °C)-99.4 °F (37.4 °C)] 96.9 °F (36.1 °C)  Pulse:  [104-113] 111  Resp:  [16-20] 16  SpO2:  [93 %-99 %] 93 %  BP: (119-142)/(63-80) 131/67     Weight: (!) 198.2 kg (437 lb)  Body mass index is 56.11 kg/m².    Intake/Output Summary (Last 24 hours) at 10/26/2020 1516  Last data filed at 10/26/2020 0812  Gross per 24 hour   Intake 200 ml   Output 1650 ml   Net -1450 ml      Physical Exam  Vitals signs and nursing note reviewed.   Constitutional:       General: He is not in acute distress.     Appearance: Normal appearance. He is obese. He is not ill-appearing.   HENT:      Head: Normocephalic and atraumatic.       Right Ear: External ear normal.      Left Ear: External ear normal.      Nose: Nose normal.      Mouth/Throat:      Pharynx: Oropharynx is clear.   Eyes:      Extraocular Movements: Extraocular movements intact.      Conjunctiva/sclera: Conjunctivae normal.      Pupils: Pupils are equal, round, and reactive to light.   Neck:      Musculoskeletal: Normal range of motion. No neck rigidity or muscular tenderness.   Cardiovascular:      Rate and Rhythm: Normal rate and regular rhythm.      Heart sounds: No murmur. No gallop.    Pulmonary:      Effort: Pulmonary effort is normal. No respiratory distress.      Breath sounds: No wheezing or rhonchi.      Comments: Two test tubes placed left upper chest. 1 pig tail tube and 1 thoracostomy tube. Former draining yellow clear fluid other draining serosanguinous fluid, both to suction  Abdominal:      General: There is no distension.      Palpations: Abdomen is soft.      Tenderness: There is no abdominal tenderness. There is no guarding.   Musculoskeletal:         General: No swelling, tenderness or deformity.      Right lower leg: No edema.      Left lower leg: No edema.   Skin:     General: Skin is warm and dry.      Coloration: Skin is not jaundiced.   Neurological:      General: No focal deficit present.      Mental Status: He is alert and oriented to person, place, and time. Mental status is at baseline.      Motor: No weakness.         Significant Labs:   CBC:   Recent Labs   Lab 10/25/20  0413   WBC 8.95   HGB 10.5*   HCT 34.5*        CMP:   Recent Labs   Lab 10/25/20  0413      K 4.1      CO2 26   GLU 93   BUN 11   CREATININE 0.8   CALCIUM 9.0   PROT 7.7   ALBUMIN 3.0*   BILITOT 0.5   ALKPHOS 59   AST 12   ALT 13   ANIONGAP 10   EGFRNONAA >60.0     Magnesium:   Recent Labs   Lab 10/25/20  0413   MG 1.9       Significant Imaging: I have reviewed all pertinent imaging results/findings within the past 24 hours.

## 2020-10-26 NOTE — ASSESSMENT & PLAN NOTE
Sputum cultures at [facility] MRSA+, + 1/3 AFB cultures, TB/MAC PCR negative.   9/21 at [facility], CT chest demonstrated a 5.7 x 4.4 cm left lower lobe cavitary lesion. Was initially treated at [facility] with augmentin after growing gram negative rods on cultures, however was also growing MRSA. Today he was started on PO clindamycin at [facility] today. On 10/20 CT surgery was consulted for wedge vs lobectomy, but no surgical intervention was done due to no significant blebs and likely infectious etiology.     - Will consult ID for further recommendations  - Chest tube drainage fluid cultures, blood cultures and respiratory cultures will be of limited value at this point since patient has been treated with antibiotics for past month; however, will redraw drainage fluid cultures for now.   - [facility] did draw tube drainage bacterial, fungal and AFB cultures--will need records  - Daily CXR's  - Will monitor patient off abx

## 2020-10-26 NOTE — MEDICAL/APP STUDENT
Progress Note  Hospital Medicine    Primary Team: [facility] HOSP MED 2  Admit Date: 10/23/2020   Length of Stay:  LOS: 3 days   SUBJECTIVE:   Reason for Admission:  Spontaneous tension pneumothorax    HPI/Interval history:    Patient is a 29 yo man with morbid obesity presenting with L chest pain and SOB since 10/16, transfer from [facility] for second opinion. In late Sept. patient reported flu-like sx--fatigue, nasal congestion, SOB with some improvement, but around 9/20 developed significant L sided chest pain worse with inspiration warranting visit to ED. LLL cavitary lesion was discovered, AFB smear +TB and MRSA, but TB/MAC PCR negative. DC with augmentin for 30d due to additional growth of GNRs.     On 10/16, patient developed L sided chest pain and SOB and went to [facility] ER. Pneumothorax likely 2/2 cavitary lesion that improved with chest tube. Chest pain and SOB recurred during his hospital stay and imaging showed recurrent pneumothorax requiring placement of a second chest tube.      NAEON.    Review of Systems:  Constitutional: no fever or chills  Respiratory: no cough or shortness of breath  Cardiovascular: +chest pain on inspiration overnight, no palpitations  Gastrointestinal: no nausea or vomiting, no abdominal pain or change in bowel habits  Behavioral/Psych: no auditory or visual hallucinations     OBJECTIVE:     Temp:  [96.9 °F (36.1 °C)-99.4 °F (37.4 °C)]   Pulse:  [104-113]   Resp:  [16-20]   BP: (119-142)/(63-80)   SpO2:  [93 %-99 %]  Body mass index is 56.11 kg/m².  Intake/Outake:  This Shift:  I/O this shift:  In: 200 [P.O.:200]  Out: 350 [Urine:350]    Net I/O past 24h:     Intake/Output Summary (Last 24 hours) at 10/26/2020 1513  Last data filed at 10/26/2020 0812  Gross per 24 hour   Intake 200 ml   Output 1650 ml   Net -1450 ml             Physical Exam:  General: well developed, well nourished, no distress, morbidly obese  Lungs:  normal respiratory effort and wheezes LLL and ELLIOT. One pigtail  catheter, one chest tube draining well under water seal.    Cardiovascular: Heart: regular rate and rhythm, S1, S2 normal, no murmur, click, rub or gallop.  Extremities: Homans sign is negative, no sign of DVT.   Abdomen/Rectal: Abdomen: soft, non-tender non-distented; bowel sounds normal; no masses,  no organomegaly.     Laboratory:  CBC/Anemia Labs: Coags:    Recent Labs   Lab 10/24/20  0405 10/24/20  0719 10/25/20  0413   WBC 9.45  --  8.95   HGB 10.8*  --  10.5*   HCT 37.0*  --  34.5*     --  331   MCV 91  --  88   RDW 14.1  --  14.2   IRON  --  17*  --    FERRITIN  --  217  --    FOLATE  --  3.8*  --    [id number]  --  492  --     Recent Labs   Lab 10/24/20  0010 10/24/20  0719   INR 4.0* 1.0   APTT 77.2*  --         Chemistries:   Recent Labs   Lab 10/24/20  0404 10/24/20  0405 10/25/20  0413   *  --  137   K 4.4  --  4.1     --  101   CO2 27  --  26   BUN 11  --  11   CREATININE 0.8 0.8 0.8   CALCIUM 9.3  --  9.0   PROT 8.3  --  7.7   BILITOT 0.4  --  0.5   ALKPHOS 63  --  59   ALT 17  --  13   AST 16  --  12   MG 1.8  --  1.9   PHOS 4.8*  --  4.6*        Medications:  Scheduled Meds:   enoxaparin  40 mg Subcutaneous Q12H    ferrous sulfate  325 mg Oral Daily    folic acid-vit B6-vit B12 2.5-25-2 mg  1 tablet Oral Daily    senna  8.6 mg Oral Daily                             Continuous Infusions:  PRN Meds:.acetaminophen, dextrose 50%, dextrose 50%, glucagon (human recombinant), glucose, glucose, morphine, sodium chloride 0.9%     ASSESSMENT/PLAN:     Patient is a 31 yo man presenting with recurring, spontaneous tension pneumothorax, cavitary lesion of lung, morbid obesity with BMI 50-59.9, iron deficiency anemia, folate deficiency anemia.     Spontaneous Tension Pneumothorax  -Chest tubes placed to suction, satisfactory location confirmed on CXR and CT.  -Chest tube replaced this morning after it cracked overnight, causing the patient pain on inspiration  -CXR 10/26: small L  pneumothorax along lat/medial margins of L mid/LL zones, persistent subQ emphysema in L chest wall and base of L neck   -Daily CXR  -Supplemental O2 prn  -CT surgery consulted for LLL cavitary lesion noted on 9/21 CT. They await [facility] records to determine whether surgical intervention is indicated.      Cavitary Lesion of Lung  -ID consulted, they await [facility] records for review of prior ID workup. As patient has been on Abx for approximately a month, new sputum cx may offer limited insights into ongoing pathology.   -DC Clindamycin that was started at transferring facility on the day prior to admission. If decompensation, start broad spectrum vancomycin, cefepime  -Check AFB sputum x3  -HIV, hepatitis, fungal cx, AFB in process  -TTE ordered given past Hx of +MRSA sputum culture  -G6PD level low at 2.4  -Blood Cx: NGTD     Morbid obesity with BMI 50-59.9  -Patient states that he feels more compelled to start weight loss now. Believes some of his current issues could be attributed to obesity.  -Diabetic diet     Iron deficiency anemia  -Ferrous sultfate EC 325mg po qd  -Daily CBC     Folate deficiency anemia  -Folic acid-vitB6-vitB12 2.5-25-2mg po qd  -Daily CBC    Active Hospital Problems    Diagnosis  POA    *Spontaneous tension pneumothorax [J93.0]  Yes    Iron deficiency anemia [D50.9]  Yes    Folate deficiency anemia [D52.9]  Yes    Morbid obesity with body mass index of 50.0-59.9 in adult [E66.01, Z68.43]  Not Applicable    Cavitary lesion of lung [J98.4]  Yes      Resolved Hospital Problems   No resolved problems to display.     DVT ppx- Enoxaparin 40mg subQ q12h  CODE Status- Full     Dispo- DC pending review of records from [facility], clearance from CT surgery, removal of chest tubes and review by ID.      Clarence Jones MS-4  Hospital Medicine Team 2    Clarence Jones MS-4  Hospital Medicine Team 2

## 2020-10-26 NOTE — SUBJECTIVE & OBJECTIVE
Interval History: NAEON. Hemodynamically stable on room air. Chest tubes to suction.     Medications:  Continuous Infusions:  Scheduled Meds:   enoxaparin  40 mg Subcutaneous Q12H    ferrous sulfate  325 mg Oral Daily    folic acid-vit B6-vit B12 2.5-25-2 mg  1 tablet Oral Daily    senna  8.6 mg Oral Daily     PRN Meds:acetaminophen, dextrose 50%, dextrose 50%, glucagon (human recombinant), glucose, glucose, morphine, sodium chloride 0.9%     Review of patient's allergies indicates:   Allergen Reactions    Shellfish containing products      Objective:     Vital Signs (Most Recent):  Temp: 97.2 °F (36.2 °C) (10/26/20 0439)  Pulse: 107 (10/26/20 0439)  Resp: 18 (10/26/20 0439)  BP: 119/79 (10/26/20 0439)  SpO2: 95 % (10/26/20 0439) Vital Signs (24h Range):  Temp:  [97.1 °F (36.2 °C)-99.4 °F (37.4 °C)] 97.2 °F (36.2 °C)  Pulse:  [] 107  Resp:  [16-20] 18  SpO2:  [93 %-99 %] 95 %  BP: (119-133)/(63-80) 119/79     Intake/Output - Last 3 Shifts       10/24 0700 - 10/25 0659 10/25 0700 - 10/26 0659 10/26 0700 - 10/27 0659    P.O. 740 920     Total Intake(mL/kg) 740 (3.7) 920 (4.6)     Urine (mL/kg/hr) 1250 (0.3) 2350 (0.5)     Stool 0 1     Total Output 1250 2351     Net -510 -1431            Urine Occurrence 3 x      Stool Occurrence 1 x 1 x           SpO2: 95 %  O2 Device (Oxygen Therapy): room air    Physical Exam    Significant Labs:  BMP:   Recent Labs   Lab 10/25/20  0413   GLU 93      K 4.1      CO2 26   BUN 11   CREATININE 0.8   CALCIUM 9.0   MG 1.9     CBC:   Recent Labs   Lab 10/25/20  0413   WBC 8.95   RBC 3.91*   HGB 10.5*   HCT 34.5*      MCV 88   MCH 26.9*   MCHC 30.4*     CMP:   Recent Labs   Lab 10/25/20  0413   GLU 93   CALCIUM 9.0   ALBUMIN 3.0*   PROT 7.7      K 4.1   CO2 26      BUN 11   CREATININE 0.8   ALKPHOS 59   ALT 13   AST 12   BILITOT 0.5       Significant Diagnostics:  CXR: I have reviewed all pertinent results/findings within the past 24 hours    VTE  Risk Mitigation (From admission, onward)         Ordered     enoxaparin injection 40 mg  Every 12 hours      10/24/20 0049     IP VTE LOW RISK PATIENT  Once      10/23/20 2321     Place sequential compression device  Until discontinued      10/23/20 2321

## 2020-10-26 NOTE — PHARMACY MED REC
Admission Medication Reconciliation - Pharmacy Consult Note    The home medication history was taken by Karolina Sahu, Pharmacy Tech.       No issues noted with the medication reconciliation.      Art Mcclellan PharmASHOK., BCPS  [id number]                  .    .

## 2020-10-27 ENCOUNTER — ANESTHESIA EVENT (OUTPATIENT)
Dept: SURGERY | Facility: HOSPITAL | Age: 30
DRG: 164 | End: 2020-10-27
Payer: MEDICAID

## 2020-10-27 LAB
ALBUMIN SERPL BCP-MCNC: 3.1 G/DL (ref 3.5–5.2)
ALP SERPL-CCNC: 57 U/L (ref 55–135)
ALT SERPL W/O P-5'-P-CCNC: 18 U/L (ref 10–44)
ANION GAP SERPL CALC-SCNC: 13 MMOL/L (ref 8–16)
AST SERPL-CCNC: 27 U/L (ref 10–40)
BASOPHILS # BLD AUTO: 0.04 K/UL (ref 0–0.2)
BASOPHILS NFR BLD: 0.4 % (ref 0–1.9)
BILIRUB SERPL-MCNC: 0.4 MG/DL (ref 0.1–1)
BUN SERPL-MCNC: 10 MG/DL (ref 6–20)
CALCIUM SERPL-MCNC: 9 MG/DL (ref 8.7–10.5)
CHLORIDE SERPL-SCNC: 101 MMOL/L (ref 95–110)
CO2 SERPL-SCNC: 19 MMOL/L (ref 23–29)
CREAT SERPL-MCNC: 0.8 MG/DL (ref 0.5–1.4)
DIFFERENTIAL METHOD: ABNORMAL
EOSINOPHIL # BLD AUTO: 0.4 K/UL (ref 0–0.5)
EOSINOPHIL NFR BLD: 4.4 % (ref 0–8)
ERYTHROCYTE [DISTWIDTH] IN BLOOD BY AUTOMATED COUNT: 14.3 % (ref 11.5–14.5)
EST. GFR  (AFRICAN AMERICAN): >60 ML/MIN/1.73 M^2
EST. GFR  (NON AFRICAN AMERICAN): >60 ML/MIN/1.73 M^2
GAMMA INTERFERON BACKGROUND BLD IA-ACNC: 0.02 IU/ML
GLUCOSE SERPL-MCNC: 82 MG/DL (ref 70–110)
HCT VFR BLD AUTO: 36.9 % (ref 40–54)
HGB BLD-MCNC: 11.8 G/DL (ref 14–18)
IMM GRANULOCYTES # BLD AUTO: 0.08 K/UL (ref 0–0.04)
IMM GRANULOCYTES NFR BLD AUTO: 0.8 % (ref 0–0.5)
LYMPHOCYTES # BLD AUTO: 2.5 K/UL (ref 1–4.8)
LYMPHOCYTES NFR BLD: 26.1 % (ref 18–48)
M TB IFN-G CD4+ BCKGRND COR BLD-ACNC: 0.01 IU/ML
MCH RBC QN AUTO: 27.7 PG (ref 27–31)
MCHC RBC AUTO-ENTMCNC: 32 G/DL (ref 32–36)
MCV RBC AUTO: 87 FL (ref 82–98)
MITOGEN IGNF BCKGRD COR BLD-ACNC: 8.55 IU/ML
MONOCYTES # BLD AUTO: 1.2 K/UL (ref 0.3–1)
MONOCYTES NFR BLD: 12.5 % (ref 4–15)
NEUTROPHILS # BLD AUTO: 5.3 K/UL (ref 1.8–7.7)
NEUTROPHILS NFR BLD: 55.8 % (ref 38–73)
NRBC BLD-RTO: 1 /100 WBC
PLATELET # BLD AUTO: 377 K/UL (ref 150–350)
PMV BLD AUTO: 11.1 FL (ref 9.2–12.9)
POTASSIUM SERPL-SCNC: 4.7 MMOL/L (ref 3.5–5.1)
PROT SERPL-MCNC: 8.1 G/DL (ref 6–8.4)
RBC # BLD AUTO: 4.26 M/UL (ref 4.6–6.2)
SARS-COV-2 RNA RESP QL NAA+PROBE: NOT DETECTED
SODIUM SERPL-SCNC: 133 MMOL/L (ref 136–145)
TB GOLD PLUS: NEGATIVE
TB2 - NIL: 0 IU/ML
WBC # BLD AUTO: 9.52 K/UL (ref 3.9–12.7)

## 2020-10-27 PROCEDURE — 63600175 PHARM REV CODE 636 W HCPCS: Performed by: STUDENT IN AN ORGANIZED HEALTH CARE EDUCATION/TRAINING PROGRAM

## 2020-10-27 PROCEDURE — U0003 INFECTIOUS AGENT DETECTION BY NUCLEIC ACID (DNA OR RNA); SEVERE ACUTE RESPIRATORY SYNDROME CORONAVIRUS 2 (SARS-COV-2) (CORONAVIRUS DISEASE [COVID-19]), AMPLIFIED PROBE TECHNIQUE, MAKING USE OF HIGH THROUGHPUT TECHNOLOGIES AS DESCRIBED BY CMS-2020-01-R: HCPCS

## 2020-10-27 PROCEDURE — 99231 SBSQ HOSP IP/OBS SF/LOW 25: CPT | Mod: ,,, | Performed by: THORACIC SURGERY (CARDIOTHORACIC VASCULAR SURGERY)

## 2020-10-27 PROCEDURE — 99231 PR SUBSEQUENT HOSPITAL CARE,LEVL I: ICD-10-PCS | Mod: ,,, | Performed by: THORACIC SURGERY (CARDIOTHORACIC VASCULAR SURGERY)

## 2020-10-27 PROCEDURE — 25000003 PHARM REV CODE 250: Performed by: STUDENT IN AN ORGANIZED HEALTH CARE EDUCATION/TRAINING PROGRAM

## 2020-10-27 PROCEDURE — 99232 SBSQ HOSP IP/OBS MODERATE 35: CPT | Mod: ,,, | Performed by: INTERNAL MEDICINE

## 2020-10-27 PROCEDURE — 11000001 HC ACUTE MED/SURG PRIVATE ROOM

## 2020-10-27 PROCEDURE — 99232 PR SUBSEQUENT HOSPITAL CARE,LEVL II: ICD-10-PCS | Mod: ,,, | Performed by: INTERNAL MEDICINE

## 2020-10-27 PROCEDURE — 80053 COMPREHEN METABOLIC PANEL: CPT

## 2020-10-27 PROCEDURE — 85025 COMPLETE CBC W/AUTO DIFF WBC: CPT

## 2020-10-27 PROCEDURE — 63600175 PHARM REV CODE 636 W HCPCS: Performed by: INTERNAL MEDICINE

## 2020-10-27 PROCEDURE — 36415 COLL VENOUS BLD VENIPUNCTURE: CPT

## 2020-10-27 RX ADMIN — FOLIC ACID-PYRIDOXINE-CYANOCOBALAMIN TAB 2.5-25-2 MG 1 TABLET: 2.5-25-2 TAB at 08:10

## 2020-10-27 RX ADMIN — ENOXAPARIN SODIUM 40 MG: 40 INJECTION SUBCUTANEOUS at 08:10

## 2020-10-27 RX ADMIN — FERROUS SULFATE TAB EC 325 MG (65 MG FE EQUIVALENT) 325 MG: 325 (65 FE) TABLET DELAYED RESPONSE at 08:10

## 2020-10-27 RX ADMIN — MORPHINE SULFATE 2 MG: 2 INJECTION, SOLUTION INTRAMUSCULAR; INTRAVENOUS at 05:10

## 2020-10-27 NOTE — PLAN OF CARE
Patient has remained afebrile, HD stable and without new respiratory symptoms while off antibiotics. Thoracic surgery planning on bronchoscopy tomorrow. ID will sign off for now. Please call if anything grows on BAL cultures or if anything concerning on OSH micro results.    Lupe Burgess PGY-4  Infectious Disease

## 2020-10-27 NOTE — HOSPITAL COURSE
Patient was admitted for evaluation of possible cavitary lesion and is being followed by general surgery and infectious disease for recommendations. Records from [facility] were obtained which included previous CT which showed no sign of cavitary lesion. During hospital admission patient was found to have G6PD deficiency with no signs of anemia or hemolysis during admission. Patient taken to be taken to OR on 10/28 for bronchoscopy and endobronchial valve placement. Patient was seen by thoracic surgery on 10/29 who clamped the pigtail catheter and repeated the CXR with plans to clamp the larger bore catheter and remove both chest tubes in AM. Records from [facility] show 3x negative TB PCR. Infectious disease recommends outpatient follow up in 2 weeks with no recommended antibiotic therapy at this time.On 10/30 pigtail catheter was removed and large bore catheter was clamped as well. Repeat CXR shows no change in pneumothorax. 2nd large bore chest tube was removed by thoracic surgery on 11/1/2020 with no complications.

## 2020-10-27 NOTE — ANESTHESIA PREPROCEDURE EVALUATION
[facility]  Anesthesia Pre-Operative Evaluation         Patient Name: Reggie An  YOB: 1990  MRN: [id number]    SUBJECTIVE:     Pre-operative evaluation for Procedure(s) (LRB):  BRONCHOSCOPY, FLEXIBLE, WITH ENDOBRONCHIAL VALVE INSERTION (N/A)     10/27/2020    Reggie An is a 30 y.o. male w/ a significant PMHx of morbidly obese male, LASHAUN, folate deficiency, G6PD deficiency, presenting with recurrent left sided pneumothoraces. Previously found to be AFB smear was positive for TB and MRSA, but TB/MAC PCR was negative. Now found to have a PTX which was thought to be 2/2 to his cavitary lesion. A chest tube was placed at that time with resolution of his symptoms.     Patient now presents for the above procedure(s).    TTE, 10/25/20   Conclusion    · This was very limited study with only few images obtained. No measurements were obtained.  · Patient was tachycardic during the exam  · Biventricular function appears normal.  · IVC indicates normal RA pressure       LDA:        Peripheral IV - Single Lumen 10/26/20 1100 20 G;1 3/4 in Left Forearm (Active)   Site Assessment Clean;Dry;Intact;No redness;No swelling 10/27/20 0400   Line Status Blood return noted;Flushed;Saline locked 10/26/20 1100   Dressing Status Clean;Dry;Intact 10/26/20 1100   Dressing Intervention First dressing 10/26/20 1100   Site Change Due 10/30/20 10/26/20 1100   Reason Not Rotated Not due 10/26/20 1100   Number of days: 0       Prev airway: None documented.  Drips: None documented.      Patient Active Problem List   Diagnosis    Cavitary lesion of lung    Spontaneous tension pneumothorax    Morbid obesity with body mass index of 50.0-59.9 in adult    Iron deficiency anemia    Folate deficiency anemia    G6PD deficiency       Review of patient's allergies indicates:   Allergen Reactions    Shellfish containing  products        Current Inpatient Medications:   enoxaparin  40 mg Subcutaneous Q12H    ferrous sulfate  325 mg Oral Daily    folic acid-vit B6-vit B12 2.5-25-2 mg  1 tablet Oral Daily    senna  8.6 mg Oral Daily       No current facility-administered medications on file prior to encounter.      No current outpatient medications on file prior to encounter.       Past Surgical History:   Procedure Laterality Date    SHOULDER ARTHROSCOPY Left        Social History     Socioeconomic History    Marital status: Single     Spouse name: Not on file    Number of children: Not on file    Years of education: Not on file    Highest education level: Not on file   Occupational History    Not on file   Social Needs    Financial resource strain: Not on file    Food insecurity     Worry: Not on file     Inability: Not on file    Transportation needs     Medical: Not on file     Non-medical: Not on file   Tobacco Use    Smoking status: Never Smoker   Substance and Sexual Activity    Alcohol use: Not Currently    Drug use: Never    Sexual activity: Not Currently   Lifestyle    Physical activity     Days per week: Not on file     Minutes per session: Not on file    Stress: Not on file   Relationships    Social connections     Talks on phone: Not on file     Gets together: Not on file     Attends Jainism service: Not on file     Active member of club or organization: Not on file     Attends meetings of clubs or organizations: Not on file     Relationship status: Not on file   Other Topics Concern    Not on file   Social History Narrative    Not on file       OBJECTIVE:     Vital Signs Range (Last 24H):  Temp:  [36.1 °C (96.9 °F)-37.1 °C (98.8 °F)]   Pulse:  [107-113]   Resp:  [16-20]   BP: (130-142)/(66-79)   SpO2:  [93 %-98 %]       Significant Labs:  Lab Results   Component Value Date    WBC 9.52 10/27/2020    HGB 11.8 (L) 10/27/2020    HCT 36.9 (L) 10/27/2020     (H) 10/27/2020    ALT 18 10/27/2020     AST 27 10/27/2020     (L) 10/27/2020    K 4.7 10/27/2020     10/27/2020    CREATININE 0.8 10/27/2020    BUN 10 10/27/2020    CO2 19 (L) 10/27/2020    TSH 1.539 10/24/2020    INR 1.0 10/24/2020    HGBA1C 4.4 10/24/2020       Diagnostic Studies:   CXR, 10/25/20   FINDINGS:  cardiac size is mildly enlarged chest tube is seen on the left small pneumothorax is seen laterally.. There is some pleural fluid or thickening present. Right lung is clear.                     EKG:   No results found for this or any previous visit.    2D ECHO:  TTE:  Results for orders placed or performed during the hospital encounter of 10/23/20   Echo Color Flow Doppler? Yes   Result Value Ref Range    BSA 3.22 m2    Narrative    · This was very limited study with only few images obtained. No   measurements were obtained.  · Patient was tachycardic during the exam  · Biventricular function appears normal.  · IVC indicates normal RA pressure          SALVADOR:  No results found for this or any previous visit.    ASSESSMENT/PLAN:         Anesthesia Evaluation    I have reviewed the Patient Summary Reports.    I have reviewed the Nursing Notes. I have reviewed the NPO Status.   I have reviewed the Medications.     Review of Systems  Anesthesia Hx:  Denies Hx of Anesthetic complications  History of prior surgery of interest to airway management or planning: Denies Family Hx of Anesthesia complications.   Denies Personal Hx of Anesthesia complications.   Hematology/Oncology:     Oncology Normal    -- Anemia: Hematology Comments: G6PD defic  LASHAUN  Folate defic    EENT/Dental:EENT/Dental Normal   Cardiovascular:  Cardiovascular Normal     Pulmonary:   Shortness of breath PTX   Renal/:  Renal/ Normal     Hepatic/GI:  Hepatic/GI Normal    Neurological:  Neurology Normal    Endocrine:  Endocrine Normal        Physical Exam  General:  Morbid Obesity    Airway/Jaw/Neck:  Airway Findings: Mouth Opening: Normal Tongue: Normal  General Airway  Assessment: Adult  Mallampati: II  TM Distance: Normal, at least 6 cm  Jaw/Neck Findings:  Neck ROM: Normal ROM     Eyes/Ears/Nose:  EYES/EARS/NOSE FINDINGS: Normal   Dental:  Dental Findings: In tact    Chest/Lungs:  Chest/Lungs Findings: Normal Respiratory Rate     Heart/Vascular:  Heart Findings: Normal Heart murmur: negative       Mental Status:  Mental Status Findings:  Cooperative, Alert and Oriented         Anesthesia Plan  Type of Anesthesia, risks & benefits discussed:  Anesthesia Type:  general  Patient's Preference:   Intra-op Monitoring Plan: standard ASA monitors  Intra-op Monitoring Plan Comments:   Post Op Pain Control Plan: multimodal analgesia, IV/PO Opioids PRN and per primary service following discharge from PACU  Post Op Pain Control Plan Comments:   Induction:   IV  Beta Blocker:  Patient is not currently on a Beta-Blocker (No further documentation required).       Informed Consent: Patient understands risks and agrees with Anesthesia plan.  Questions answered. Anesthesia consent signed with patient.  ASA Score: 3     Day of Surgery Review of History & Physical: I have interviewed and examined the patient. I have reviewed the patient's H&P dated: 10/27/20.   H&P update referred to the surgeon.         Ready For Surgery From Anesthesia Perspective.

## 2020-10-27 NOTE — CONSULTS
[facility]  Infectious Disease  Consult Note    Patient Name: Reggie An  MRN: [id number]  Admission Date: 10/23/2020  Hospital Length of Stay: 3 days  Attending Physician: Anson Bustos MD  Primary Care Provider: Primary Doctor No     Isolation Status: Contact and Airborne    Patient information was obtained from patient, past medical records and ER records.      Consults  Assessment/Plan:     Cavitary lesion of lung  30 year old male who presents [facility] for second opinion regarding multiple occurences of pneumothorax. Hospital course is somewhat unclear and patient does not have records with him from [facility]. Patient reports that he was put on augmentin for a 30 day course which he says he completed most of it. He was reportedly growing MRSA in his sputum and gram negative rods (source unclear). In addition, there are reports of negative TB test via PCR. Patient has reportedly had 3 chest tubes put in him (last one placed prior to transfer). One of which was used to drain the cavitary lesion, he does not know if those grew anything. Patient is comfortable on room air and denies any SOB, current chest pain, fevers, chills, unintentional weight loss, or night sweats. He denies any recent travel, incarceration, work with the homeless population, or outdoor activities. No known sick contacts, no pets at home. He was started on oral clindamycin prior to transfer    - Will need record of hospital course at [facility], attempted to acquire however told that micro lab will not give results and that medical records must be contacted, furthermore, no one is available from there on weekends  - Continue observing off antibiotics.  - If patient decompensated, can do broad spectrum vanc and cefepime  - Further recs to follow once review of work-up at [facility] is available         Thank you for your consult. I will follow-up with patient. Please contact us if you have any additional  questions.    Lui Garcia MD  Infectious Disease  [facility]    Subjective:     Principal Problem: Spontaneous tension pneumothorax    HPI: Patient is a 30 year old male with morbid obesity who presents as a transfer for a second opinion regarding evaluation of multiple left sided pneumothoraces. Patient was not sent with hospital records so history gathered primarily from patient report. His problems started in late September with flu like symptoms including fatigue, nasal congestion, and SOB. Though there was initial improvement he started to experience significant left sided chest pain. He went to [facility] ER and found to have LLL cavitary lesion. Patient supposedly had MRSA I his sputum, and had a negative TB PCR.  He reports that he was discharged with 30 day course of augmentin; this was due to gram negative rods, unclear of the source. Patient returned to [facility] on 10/16 and was found to have pneumothorax. He had a chest tube placed. Although his symptoms improved initially he continued to have chest pain and was found to have recurring pneumothorax. He had an additional chest tube placed for this. Following this he had additional chest tube place prior to transfer. He reports that he saw ID at [facility], but he was unable to give details regarding their assessment. At the time of my exam, patient denies any SOB, chest pain, cough, fevers, chills, night sweats, or unintentional weight loss. He denies any known sick contacts, incarceration, exposure to homeless population, recent travel, outdoor activity, or pets in his home.     Interval History: Events noted. Off abx. Feels better (off abx). Denies fever or chills.     Review of Systems   Constitutional: Negative for chills and fever.   Respiratory: Positive for shortness of breath.    All other systems reviewed and are negative.    Objective:     Vital Signs (Most Recent):  Temp: 96.9 °F (36.1 °C) (10/26/20 1110)  Pulse: (!) 111  (10/26/20 1110)  Resp: 16 (10/26/20 1110)  BP: 131/67 (10/26/20 1110)  SpO2: (!) 93 % (10/26/20 1110) Vital Signs (24h Range):  Temp:  [96.9 °F (36.1 °C)-98.1 °F (36.7 °C)] 96.9 °F (36.1 °C)  Pulse:  [104-111] 111  Resp:  [16-20] 16  SpO2:  [93 %-98 %] 93 %  BP: (119-142)/(67-79) 131/67     Weight: (!) 198.2 kg (437 lb)  Body mass index is 56.11 kg/m².    Estimated Creatinine Clearance: 243.4 mL/min (based on SCr of 0.8 mg/dL).    Physical Exam  Vitals signs and nursing note reviewed.   Constitutional:       General: He is not in acute distress.     Appearance: Normal appearance. He is obese. He is not ill-appearing, toxic-appearing or diaphoretic.   HENT:      Head: Normocephalic and atraumatic.      Right Ear: External ear normal.      Left Ear: External ear normal.      Mouth/Throat:      Mouth: Mucous membranes are moist.   Eyes:      General: No scleral icterus.     Extraocular Movements: Extraocular movements intact.      Conjunctiva/sclera: Conjunctivae normal.      Pupils: Pupils are equal, round, and reactive to light.   Cardiovascular:      Rate and Rhythm: Normal rate and regular rhythm.      Heart sounds: No murmur.   Pulmonary:      Effort: Pulmonary effort is normal.      Breath sounds: Normal breath sounds.   Musculoskeletal: Normal range of motion.   Skin:     General: Skin is warm and dry.   Neurological:      General: No focal deficit present.      Mental Status: He is alert and oriented to person, place, and time.   Psychiatric:         Mood and Affect: Mood normal.         Behavior: Behavior normal.         Thought Content: Thought content normal.         Judgment: Judgment normal.         Significant Labs:   Blood Culture:   Recent Labs   Lab 10/24/20  1646   LABBLOO No Growth to date  No Growth to date  No Growth to date  No Growth to date  No Growth to date  No Growth to date     CBC:   Recent Labs   Lab 10/25/20  0413   WBC 8.95   HGB 10.5*   HCT 34.5*        CMP:   Recent Labs    Lab 10/25/20  0413      K 4.1      CO2 26   GLU 93   BUN 11   CREATININE 0.8   CALCIUM 9.0   PROT 7.7   ALBUMIN 3.0*   BILITOT 0.5   ALKPHOS 59   AST 12   ALT 13   ANIONGAP 10   EGFRNONAA >60.0     Respiratory Culture: No results for input(s): GSRESP, RESPIRATORYC in the last 4320 hours.  Wound Culture: No results for input(s): LABAERO in the last 4320 hours.    Significant Imaging: I have reviewed all pertinent imaging results/findings within the past 24 hours.

## 2020-10-27 NOTE — ASSESSMENT & PLAN NOTE
Sputum cultures at [facility] MRSA+, + 1/3 AFB cultures, TB/MAC PCR negative.   9/21 at [facility], CT chest demonstrated a 5.7 x 4.4 cm left lower lobe cavitary lesion. Was initially treated at [facility] with augmentin after growing gram negative rods on cultures, however was also growing MRSA. Today he was started on PO clindamycin at [facility] today. On 10/20 CT surgery was consulted for wedge vs lobectomy, but no surgical intervention was done due to no significant blebs and likely infectious etiology. Records from [facility] showed no sign of cavitary lesion and  plans to proceed with bronchoscopy and endobronchial valve placement in AM on 10/28. Patient continues to be stable and afebrile while off antibiotics.     Plan  -  ID is following with plans for updated recommendations  - Chest tube drainage fluid cultures, blood cultures and respiratory cultures will be of limited value at this point since patient has been treated with antibiotics for past month; however, will redraw drainage fluid cultures for now.   - [facility] did draw tube drainage bacterial, fungal and AFB cultures--will need records  - Daily CXR's  - Will monitor patient off abx

## 2020-10-27 NOTE — ASSESSMENT & PLAN NOTE
Patient found to have G6PD level of 2.4 with normal value of 7-25. Patient was tested based on possible treatment with oxidizing agent for TB or cavitary lesions. Allergies to Sulfa placed in chart.    Plan  - Avoid oxidizing agents and monitor for signs of worsening anemia

## 2020-10-27 NOTE — CONSULTS
Wound care automatically consulted related to patient's weight.   Bariatric Low air loss surface ordered  Chart reviewed, patient independent, no skin issues.  SONI Jung RN, [facility]  [id number]

## 2020-10-27 NOTE — SUBJECTIVE & OBJECTIVE
Interval History: Events noted. Off abx. Feels better (off abx). Denies fever or chills.     Review of Systems   Constitutional: Negative for chills and fever.   Respiratory: Positive for shortness of breath.    All other systems reviewed and are negative.    Objective:     Vital Signs (Most Recent):  Temp: 96.9 °F (36.1 °C) (10/26/20 1110)  Pulse: (!) 111 (10/26/20 1110)  Resp: 16 (10/26/20 1110)  BP: 131/67 (10/26/20 1110)  SpO2: (!) 93 % (10/26/20 1110) Vital Signs (24h Range):  Temp:  [96.9 °F (36.1 °C)-98.1 °F (36.7 °C)] 96.9 °F (36.1 °C)  Pulse:  [104-111] 111  Resp:  [16-20] 16  SpO2:  [93 %-98 %] 93 %  BP: (119-142)/(67-79) 131/67     Weight: (!) 198.2 kg (437 lb)  Body mass index is 56.11 kg/m².    Estimated Creatinine Clearance: 243.4 mL/min (based on SCr of 0.8 mg/dL).    Physical Exam  Vitals signs and nursing note reviewed.   Constitutional:       General: He is not in acute distress.     Appearance: Normal appearance. He is obese. He is not ill-appearing, toxic-appearing or diaphoretic.   HENT:      Head: Normocephalic and atraumatic.      Right Ear: External ear normal.      Left Ear: External ear normal.      Mouth/Throat:      Mouth: Mucous membranes are moist.   Eyes:      General: No scleral icterus.     Extraocular Movements: Extraocular movements intact.      Conjunctiva/sclera: Conjunctivae normal.      Pupils: Pupils are equal, round, and reactive to light.   Cardiovascular:      Rate and Rhythm: Normal rate and regular rhythm.      Heart sounds: No murmur.   Pulmonary:      Effort: Pulmonary effort is normal.      Breath sounds: Normal breath sounds.   Musculoskeletal: Normal range of motion.   Skin:     General: Skin is warm and dry.   Neurological:      General: No focal deficit present.      Mental Status: He is alert and oriented to person, place, and time.   Psychiatric:         Mood and Affect: Mood normal.         Behavior: Behavior normal.         Thought Content: Thought content  normal.         Judgment: Judgment normal.         Significant Labs:   Blood Culture:   Recent Labs   Lab 10/24/20  1646   LABBLOO No Growth to date  No Growth to date  No Growth to date  No Growth to date  No Growth to date  No Growth to date     CBC:   Recent Labs   Lab 10/25/20  0413   WBC 8.95   HGB 10.5*   HCT 34.5*        CMP:   Recent Labs   Lab 10/25/20  0413      K 4.1      CO2 26   GLU 93   BUN 11   CREATININE 0.8   CALCIUM 9.0   PROT 7.7   ALBUMIN 3.0*   BILITOT 0.5   ALKPHOS 59   AST 12   ALT 13   ANIONGAP 10   EGFRNONAA >60.0     Respiratory Culture: No results for input(s): GSRESP, RESPIRATORYC in the last 4320 hours.  Wound Culture: No results for input(s): LABAERO in the last 4320 hours.    Significant Imaging: I have reviewed all pertinent imaging results/findings within the past 24 hours.

## 2020-10-27 NOTE — SUBJECTIVE & OBJECTIVE
Interval History: NAEON. Patient reports feeling well and denies any constitutional, cardiac, worsening respiratory symptoms, GI or  complaints. Patient reports resolved coughing and wheezing and reports subcutaneous emphysema is still present and is mildly tender to touch. Discussion was had with patient concerning upcoming procedure and recent diagnosis of G6PD deficiency.     Review of Systems   Constitutional: Negative for chills, fatigue and fever.   HENT: Negative for hearing loss, rhinorrhea and sinus pain.    Eyes: Negative for photophobia and visual disturbance.   Respiratory: Negative for cough, choking, chest tightness, shortness of breath and wheezing.    Cardiovascular: Negative for chest pain and palpitations.   Gastrointestinal: Negative for abdominal distention, abdominal pain, diarrhea and nausea.   Genitourinary: Negative for difficulty urinating, dysuria, flank pain and hematuria.   Musculoskeletal: Negative for back pain, joint swelling and myalgias.   Skin: Negative for color change and pallor.   Neurological: Negative for dizziness, facial asymmetry, weakness and headaches.   Psychiatric/Behavioral: Negative for agitation and confusion.     Objective:     Vital Signs (Most Recent):  Temp: 98.9 °F (37.2 °C) (10/27/20 1123)  Pulse: (!) 113 (10/27/20 1123)  Resp: 16 (10/27/20 1123)  BP: (!) 140/79 (10/27/20 1123)  SpO2: 96 % (10/27/20 1123) Vital Signs (24h Range):  Temp:  [97.7 °F (36.5 °C)-98.9 °F (37.2 °C)] 98.9 °F (37.2 °C)  Pulse:  [107-113] 113  Resp:  [16-20] 16  SpO2:  [96 %-99 %] 96 %  BP: (130-140)/(66-79) 140/79     Weight: (!) 198.2 kg (437 lb)  Body mass index is 56.11 kg/m².    Intake/Output Summary (Last 24 hours) at 10/27/2020 1339  Last data filed at 10/27/2020 0855  Gross per 24 hour   Intake --   Output 875 ml   Net -875 ml      Physical Exam  Vitals signs and nursing note reviewed.   Constitutional:       General: He is not in acute distress.     Appearance: Normal  appearance. He is obese. He is not ill-appearing.   HENT:      Head: Normocephalic and atraumatic.      Right Ear: External ear normal.      Left Ear: External ear normal.      Nose: Nose normal.      Mouth/Throat:      Pharynx: Oropharynx is clear.   Eyes:      Extraocular Movements: Extraocular movements intact.      Conjunctiva/sclera: Conjunctivae normal.      Pupils: Pupils are equal, round, and reactive to light.   Neck:      Musculoskeletal: Normal range of motion. No neck rigidity or muscular tenderness.   Cardiovascular:      Rate and Rhythm: Normal rate and regular rhythm.      Heart sounds: No murmur. No gallop.    Pulmonary:      Effort: Pulmonary effort is normal. No respiratory distress.      Breath sounds: No wheezing or rhonchi.      Comments: Two test tubes placed left upper chest. 1 pig tail tube and 1 thoracostomy tube. Former draining yellow clear fluid other draining serosanguinous fluid, both to suction  Abdominal:      General: There is no distension.      Palpations: Abdomen is soft.      Tenderness: There is no abdominal tenderness. There is no guarding.   Musculoskeletal:         General: No swelling, tenderness or deformity.      Right lower leg: No edema.      Left lower leg: No edema.   Skin:     General: Skin is warm and dry.      Coloration: Skin is not jaundiced.   Neurological:      General: No focal deficit present.      Mental Status: He is alert and oriented to person, place, and time. Mental status is at baseline.      Motor: No weakness.   Psychiatric:         Mood and Affect: Mood normal.         Behavior: Behavior normal.         Significant Labs:   CBC:   Recent Labs   Lab 10/27/20  0511   WBC 9.52   HGB 11.8*   HCT 36.9*   *     CMP:   Recent Labs   Lab 10/27/20  0511   *   K 4.7      CO2 19*   GLU 82   BUN 10   CREATININE 0.8   CALCIUM 9.0   PROT 8.1   ALBUMIN 3.1*   BILITOT 0.4   ALKPHOS 57   AST 27   ALT 18   ANIONGAP 13   EGFRNONAA >60.0        Significant Imaging: I have reviewed all pertinent imaging results/findings within the past 24 hours.

## 2020-10-27 NOTE — PLAN OF CARE
10/27/20 0901   Post-Acute Status   Post-Acute Authorization Other   Other Status No Post-Acute Service Needs

## 2020-10-27 NOTE — SUBJECTIVE & OBJECTIVE
Interval History: No acute changes overnight  On RA  CT with persistent airleak    Medications:  Continuous Infusions:  Scheduled Meds:   enoxaparin  40 mg Subcutaneous Q12H    ferrous sulfate  325 mg Oral Daily    folic acid-vit B6-vit B12 2.5-25-2 mg  1 tablet Oral Daily    senna  8.6 mg Oral Daily     PRN Meds:acetaminophen, albuterol, dextrose 50%, dextrose 50%, glucagon (human recombinant), glucose, glucose, morphine, sodium chloride 0.9%     Review of patient's allergies indicates:   Allergen Reactions    Shellfish containing products      Objective:     Vital Signs (Most Recent):  Temp: 97.7 °F (36.5 °C) (10/27/20 0427)  Pulse: 107 (10/27/20 0427)  Resp: 18 (10/27/20 0533)  BP: 132/66 (10/27/20 0427)  SpO2: 96 % (10/27/20 0427) Vital Signs (24h Range):  Temp:  [96.9 °F (36.1 °C)-98.8 °F (37.1 °C)] 97.7 °F (36.5 °C)  Pulse:  [107-113] 107  Resp:  [16-20] 18  SpO2:  [93 %-98 %] 96 %  BP: (130-142)/(66-79) 132/66     Intake/Output - Last 3 Shifts       10/25 0700 - 10/26 0659 10/26 0700 - 10/27 0659 10/27 0700 - 10/28 0659    P.O. 920 200     Total Intake(mL/kg) 920 (4.6) 200 (1)     Urine (mL/kg/hr) 2350 (0.5) 700 (0.1)     Stool 1      Total Output 2351 700     Net -1431 -500            Stool Occurrence 1 x            SpO2: 96 %  O2 Device (Oxygen Therapy): room air    Physical Exam  Vitals signs reviewed.   Constitutional:       General: He is not in acute distress.     Appearance: Normal appearance. He is obese. He is not ill-appearing.   HENT:      Head: Normocephalic and atraumatic.      Right Ear: External ear normal.      Left Ear: External ear normal.      Nose: Nose normal.      Mouth/Throat:      Pharynx: Oropharynx is clear.   Eyes:      Extraocular Movements: Extraocular movements intact.      Conjunctiva/sclera: Conjunctivae normal.      Pupils: Pupils are equal, round, and reactive to light.   Neck:      Musculoskeletal: Normal range of motion. No neck rigidity or muscular tenderness.    Cardiovascular:      Rate and Rhythm: Normal rate and regular rhythm.      Heart sounds: No murmur. No gallop.    Pulmonary:      Effort: Pulmonary effort is normal. No respiratory distress.      Breath sounds: No wheezing or rhonchi.      Comments: Two test tubes placed left upper chest  Upper chest tube with serosanguinous output  Lower pigtail catheter with thin serous output, + air leak  Abdominal:      General: There is no distension.      Palpations: Abdomen is soft.      Tenderness: There is no abdominal tenderness. There is no guarding.   Musculoskeletal:         General: No swelling, tenderness or deformity.      Right lower leg: No edema.      Left lower leg: No edema.   Skin:     General: Skin is warm and dry.      Coloration: Skin is not jaundiced.   Neurological:      General: No focal deficit present.      Mental Status: He is alert and oriented to person, place, and time. Mental status is at baseline.      Motor: No weakness.         Significant Labs:  ABGs: No results for input(s): PH, PCO2, PO2, HCO3, POCSATURATED, BE in the last 48 hours.  Amylase: No results for input(s): AMYLASE in the last 48 hours.  BMP:   Recent Labs   Lab 10/27/20  0511   GLU 82   *   K 4.7      CO2 19*   BUN 10   CREATININE 0.8   CALCIUM 9.0     Cardiac markers: No results for input(s): CKMB, CPKMB, TROPONINT, TROPONINI, MYOGLOBIN in the last 48 hours.  CBC:   Recent Labs   Lab 10/27/20  0511   WBC 9.52   RBC 4.26*   HGB 11.8*   HCT 36.9*   *   MCV 87   MCH 27.7   MCHC 32.0     CMP:   Recent Labs   Lab 10/27/20  0511   GLU 82   CALCIUM 9.0   ALBUMIN 3.1*   PROT 8.1   *   K 4.7   CO2 19*      BUN 10   CREATININE 0.8   ALKPHOS 57   ALT 18   AST 27   BILITOT 0.4       Significant Diagnostics:  I have reviewed all pertinent imaging results/findings within the past 24 hours.    VTE Risk Mitigation (From admission, onward)         Ordered     enoxaparin injection 40 mg  Every 12 hours      10/24/20  0049     IP VTE LOW RISK PATIENT  Once      10/23/20 2321     Place sequential compression device  Until discontinued      10/23/20 2321

## 2020-10-27 NOTE — ASSESSMENT & PLAN NOTE
Patient is a 31 yo M with obesity who presented as a transfer for complaints of SOB and fatigue and was found to have recurrent tension pneumothorax with chest tube placement. Currently with 2 (1 pigtail and 1 thoracostomy tube) chest tubes after initial one failed. Initially tension pneumothorax presumably due to his infectious cavitary lesion. Here for second opinion. Patient develops symptoms after clamped. On 10/20, CT showed large left pneumothroax with mediastinal shift to right and large volume subcutaneous emphysema in left chest wall. Subsequent CXR showed adequate expansion after Chest tube adjusment. Repeat CXR here at [facility] show's small apical left sided pneumothorax due to his cavitary lesion. Review of previous CT from [facility] and CT done this admission show no signs of cavitary lesion. Daily CXR show unchanged pneumothorax and persistent subcutaneous emphysema. Patient continues to have serosanguinous drainage without any acute changes and denies any new SOB or respiratory complaints.    Plan  - NPO at midnight for bronchoscopy with endobronchial valve placement   - Chest tubes placed to suction, drains on side of bed  - Consult thoracic surgery for recommendations   - Pain regimen added   - Supplemental O2 as needed  - PRN albuterol for wheezing  - Daily CXR's

## 2020-10-27 NOTE — PLAN OF CARE
CM assessment complete, patient AAOX4 and participated in interview at bedside. Pt lives with family and is independent with all ADLs. Pt states he became ill in Sept of 2020, went to [facility], where they transferred him to [facility]. From there he was transferred her to [facility] for higher level of care . He currently has two chest tubes , ambulating to chair w/o difficulty fully participating in this interview. CM obtained signature for release of medical records  from [facility] for the [facility] team to review, Will cont to follow.  Admit Dx:  Cavitary lesion of lung [J98.4]    Patient Care Team:  Primary Doctor No as PCP - General    Payor: MEDICAID / Plan: [place] [employer] / Product Type: Managed Medicaid /       [id number] - [place], [place] - [address]  [address] [address]  Phone: [phone] Fax: [phone]     10/26/20 0933   Discharge Assessment   Assessment Type Discharge Planning Assessment   Confirmed/corrected address and phone number on facesheet? Yes   Assessment information obtained from? Patient   Expected Length of Stay (days) 4   Communicated expected length of stay with patient/caregiver yes   Prior to hospitilization cognitive status: Alert/Oriented   Prior to hospitalization functional status: Independent   Current cognitive status: Alert/Oriented   Current Functional Status: Independent   Facility Arrived From: [facility]   Lives With parent(s)   Able to Return to Prior Arrangements yes   Is patient able to care for self after discharge? Yes   Who are your caregiver(s) and their phone number(s)? Skylar Groves [phone]   Patient's perception of discharge disposition home or selfcare   Readmission Within the Last 30 Days no previous admission in last 30 days   Patient currently being followed by outpatient case management? No   Patient currently receives any other outside agency services? No   Equipment  Currently Used at Home none   Do you have any problems affording any of your prescribed medications? No   Is the patient taking medications as prescribed? yes   Does the patient have transportation home? Yes   Transportation Anticipated family or friend will provide   Does the patient receive services at the [facility]? No   Discharge Plan A Home   Discharge Plan B Home   DME Needed Upon Discharge  none   Patient/Family in Agreement with Plan yes   Trena Sierra, MSN  Case Management  Ext [phone]

## 2020-10-27 NOTE — PROGRESS NOTES
[facility] Hospital Medicine  Progress Note    Patient Name: Reggie An  MRN: [id number]  Patient Class: IP- Inpatient   Admission Date: 10/23/2020  Length of Stay: 4 days  Attending Physician: Anson Bustos MD  Primary Care Provider: Primary Doctor [facility] Medicine Team: [facility] HOSP MED 2 Ritesh Novoa MD    Subjective:     Principal Problem:Spontaneous tension pneumothorax        HPI:  Mr. An is a 30 y.o. M with morbid obesity presenting of left sided chest pain and SOB since 10/16. He was transferred here from [facility] for a second opinion. Around the week prior to 9/20, patient reported flu like symptoms such as fatigue, some nasal congestion and SOB. He said he started to feel better and his cold symptoms were improving but Around 9/20 he developed significant left sided chest pain, worse with inspiration. He went to the ER and was found to have a LLL cavitary lesion. An AFB smear was positive for TB and MRSA, but TB/MAC PCR was negative. He was discharged with augmentin for 30 days because also growing gram negative rods. About another week goes by and his left sided chest pain and SOB comes back. He goes back to [facility] ER on 10/16 and was found to have a pneumothroax likely secondary to his cavitary lesion. A chest tube was placed with improvement of symptoms. However, during his stay he had intermittent episodes of chest pain and sob and repeat imaging showed recurring pneumothorax. Another chest tube was placed and he was transferred to [facility] for a second opinion regarding recurrent pneumothorax. He states that when ever his chest tube is clamped he develops symptoms. He denies any travel history or sick contacts that he knows of. He denies fevers, chills, n/v/d or cough.            Overview/Hospital Course:  Patient was admitted for evaluation of possible cavitary lesion and is being followed by general surgery and infectious disease for recommendations. Records from [facility] were  obtained which included previous CT which showed no sign of cavitary lesion. Patient taken to be taken to OR on 10/28 for bronchoscopy and endobronchial valve placement.     Interval History: NAEON. Patient reports feeling well and denies any constitutional, cardiac, worsening respiratory symptoms, GI or  complaints. Patient reports resolved coughing and wheezing and reports subcutaneous emphysema is still present and is mildly tender to touch. Discussion was had with patient concerning upcoming procedure and recent diagnosis of G6PD deficiency.     Review of Systems   Constitutional: Negative for chills, fatigue and fever.   HENT: Negative for hearing loss, rhinorrhea and sinus pain.    Eyes: Negative for photophobia and visual disturbance.   Respiratory: Negative for cough, choking, chest tightness, shortness of breath and wheezing.    Cardiovascular: Negative for chest pain and palpitations.   Gastrointestinal: Negative for abdominal distention, abdominal pain, diarrhea and nausea.   Genitourinary: Negative for difficulty urinating, dysuria, flank pain and hematuria.   Musculoskeletal: Negative for back pain, joint swelling and myalgias.   Skin: Negative for color change and pallor.   Neurological: Negative for dizziness, facial asymmetry, weakness and headaches.   Psychiatric/Behavioral: Negative for agitation and confusion.     Objective:     Vital Signs (Most Recent):  Temp: 98.9 °F (37.2 °C) (10/27/20 1123)  Pulse: (!) 113 (10/27/20 1123)  Resp: 16 (10/27/20 1123)  BP: (!) 140/79 (10/27/20 1123)  SpO2: 96 % (10/27/20 1123) Vital Signs (24h Range):  Temp:  [97.7 °F (36.5 °C)-98.9 °F (37.2 °C)] 98.9 °F (37.2 °C)  Pulse:  [107-113] 113  Resp:  [16-20] 16  SpO2:  [96 %-99 %] 96 %  BP: (130-140)/(66-79) 140/79     Weight: (!) 198.2 kg (437 lb)  Body mass index is 56.11 kg/m².    Intake/Output Summary (Last 24 hours) at 10/27/2020 1330  Last data filed at 10/27/2020 0855  Gross per 24 hour   Intake --   Output  875 ml   Net -875 ml      Physical Exam  Vitals signs and nursing note reviewed.   Constitutional:       General: He is not in acute distress.     Appearance: Normal appearance. He is obese. He is not ill-appearing.   HENT:      Head: Normocephalic and atraumatic.      Right Ear: External ear normal.      Left Ear: External ear normal.      Nose: Nose normal.      Mouth/Throat:      Pharynx: Oropharynx is clear.   Eyes:      Extraocular Movements: Extraocular movements intact.      Conjunctiva/sclera: Conjunctivae normal.      Pupils: Pupils are equal, round, and reactive to light.   Neck:      Musculoskeletal: Normal range of motion. No neck rigidity or muscular tenderness.   Cardiovascular:      Rate and Rhythm: Normal rate and regular rhythm.      Heart sounds: No murmur. No gallop.    Pulmonary:      Effort: Pulmonary effort is normal. No respiratory distress.      Breath sounds: No wheezing or rhonchi.      Comments: Two test tubes placed left upper chest. 1 pig tail tube and 1 thoracostomy tube. Former draining yellow clear fluid other draining serosanguinous fluid, both to suction  Abdominal:      General: There is no distension.      Palpations: Abdomen is soft.      Tenderness: There is no abdominal tenderness. There is no guarding.   Musculoskeletal:         General: No swelling, tenderness or deformity.      Right lower leg: No edema.      Left lower leg: No edema.   Skin:     General: Skin is warm and dry.      Coloration: Skin is not jaundiced.   Neurological:      General: No focal deficit present.      Mental Status: He is alert and oriented to person, place, and time. Mental status is at baseline.      Motor: No weakness.   Psychiatric:         Mood and Affect: Mood normal.         Behavior: Behavior normal.         Significant Labs:   CBC:   Recent Labs   Lab 10/27/20  0511   WBC 9.52   HGB 11.8*   HCT 36.9*   *     CMP:   Recent Labs   Lab 10/27/20  0511   *   K 4.7      CO2 19*    GLU 82   BUN 10   CREATININE 0.8   CALCIUM 9.0   PROT 8.1   ALBUMIN 3.1*   BILITOT 0.4   ALKPHOS 57   AST 27   ALT 18   ANIONGAP 13   EGFRNONAA >60.0       Significant Imaging: I have reviewed all pertinent imaging results/findings within the past 24 hours.      Assessment/Plan:      * Spontaneous tension pneumothorax  Patient is a 31 yo M with obesity who presented as a transfer for complaints of SOB and fatigue and was found to have recurrent tension pneumothorax with chest tube placement. Currently with 2 (1 pigtail and 1 thoracostomy tube) chest tubes after initial one failed. Initially tension pneumothorax presumably due to his infectious cavitary lesion. Here for second opinion. Patient develops symptoms after clamped. On 10/20, CT showed large left pneumothroax with mediastinal shift to right and large volume subcutaneous emphysema in left chest wall. Subsequent CXR showed adequate expansion after Chest tube adjusment. [place] [facility] show's small apical left sided pneumothorax due to his cavitary lesion. Review of previous CT from [facility] and CT done this admission show no signs of cavitary lesion. Daily CXR show unchanged pneumothorax and persistent subcutaneous emphysema. Patient continues to have serosanguinous drainage without any acute changes and denies any new SOB or respiratory complaints.    Plan  - NPO at midnight for bronchoscopy with endobronchial valve placement   - Chest tubes placed to suction, drains on side of bed  - Consult thoracic surgery for recommendations   - Pain regimen added   - Supplemental O2 as needed  - PRN albuterol for wheezing  - Daily CXR's        G6PD deficiency  Patient found to have G6PD level of 2.4 with normal value of 7-25. Patient was tested based on possible treatment with oxidizing agent for TB or cavitary lesions. Allergies to Sulfa placed in chart.    Plan  - Avoid oxidizing agents and monitor for signs of worsening anemia    Folate deficiency anemia  -  continue PO Folate      Iron deficiency anemia  - continue PO Iron       Morbid obesity with body mass index of 50.0-59.9 in adult  - diabetic diet      Cavitary lesion of lung  Sputum cultures at [facility] MRSA+, + 1/3 AFB cultures, TB/MAC PCR negative.   9/21 at [facility], CT chest demonstrated a 5.7 x 4.4 cm left lower lobe cavitary lesion. Was initially treated at [facility] with augmentin after growing gram negative rods on cultures, however was also growing MRSA. Today he was started on PO clindamycin at [facility] today. On 10/20 CT surgery was consulted for wedge vs lobectomy, but no surgical intervention was done due to no significant blebs and likely infectious etiology. Records from [facility] showed no sign of cavitary lesion and  plans to proceed with bronchoscopy and endobronchial valve placement in AM on 10/28. Patient continues to be stable and afebrile while off antibiotics.     Plan  -  ID is following with plans for updated recommendations  - Chest tube drainage fluid cultures, blood cultures and respiratory cultures will be of limited value at this point since patient has been treated with antibiotics for past month; however, will redraw drainage fluid cultures for now.   - [facility] did draw tube drainage bacterial, fungal and AFB cultures--will need records  - Daily CXR's  - Will monitor patient off abx        VTE Risk Mitigation (From admission, onward)         Ordered     enoxaparin injection 40 mg  Every 12 hours      10/24/20 0049     IP VTE LOW RISK PATIENT  Once      10/23/20 2321     Place sequential compression device  Until discontinued      10/23/20 2321                Discharge Planning   RAMESH: 10/30/2020     Code Status: Full Code   Is the patient medically ready for discharge?:     Reason for patient still in hospital (select all that apply): Treatment and Consult recommendations  Discharge Plan A: Home                  Ritesh Novoa MD  Department of Hospital Medicine   [facility]

## 2020-10-27 NOTE — MEDICAL/APP STUDENT
[facility]  Infectious Disease  Progress Note    Patient Name: Reggie An  MRN: [id number]  Admission Date: 10/23/2020  Hospital Length of Stay: 4 days  Attending Physician: Anson Bustos MD   Primary Care Provider: Primary Doctor No       Assessment/Plan:   Patient is a 29 yo male who presents [facility] for [facility] opinion following multiple occurrences of pneumothoraces.     Cavitary lesion of lung  - Continue observing off antibiotics.  - If patient decompensated, can do broad spectrum vanc and cefepime  -awaiting [facility] medical records for review     Spontaneous pneumothoraces      -Thoracic surgery following      -Pt is scheduled for flexible bronchoscopy with endobronchial valve insertion       Subjective:   Principal Problem: Spontaneous tension pneumothorax    HPI:Patient is a 30 yoM w.o.significant pmhx, presents as transfer from [facility] for second opinion following multiple acute onset of L pneumothoraces requiring multiple chest tube placements.     Pt reports he was on a 30 day course of augmentin which he says he completed most of. He was reportedly growing MRSA in his sputum and gm negative rods. Reports of negative TB test via PCR. PT reports he had 3 chest tubes placed. One was used to drain cavitary lesion. Pt was started on clindamycin prior to transfer, it has since been d/c'd.      At the time of my exam, patient denies any SOB, chest pain, cough, fevers, chills or night sweats.     Interval hx: NAEO; Thoracic is following pt, scheduled flex bronchoscopy for tomorrow.     No notes on file    Active Diagnoses:    Diagnosis Date Noted POA    PRINCIPAL PROBLEM:  Spontaneous tension pneumothorax [J93.0] 10/23/2020 Yes    G6PD deficiency [D75.A] 10/26/2020 Unknown    Iron deficiency anemia [D50.9] 10/24/2020 Yes    Folate deficiency anemia [D52.9] 10/24/2020 Yes    Morbid obesity with body mass index of 50.0-59.9 in adult [E66.01, Z68.43]  Not Applicable    Cavitary  lesion of lung [J98.4] 10/23/2020 Yes      Problems Resolved During this Admission:     VTE Risk Mitigation (From admission, onward)         Ordered     enoxaparin injection 40 mg  Every 12 hours      10/24/20 0049     IP VTE LOW RISK PATIENT  Once      10/23/20 2321     Place sequential compression device  Until discontinued      10/23/20 2321                Thank you for your consult. I will follow-up with patient. Please contact us if you have any additional questions.    Taraneh Hadizadeh [facility]

## 2020-10-27 NOTE — PROGRESS NOTES
[facility]  Thoracic Surgery  Progress Note    Subjective:     History of Present Illness:  Patient is a 29yo morbidly obese male without any other significant medical history who presented to [facility] as transfer from [facility] for evaluation given recurrent left sided pneumothoraces. Patient reports prior to mid-September he was in his usual state of health. Around 9/20 he developed flu-like symptoms with fatigue, congestion and some shortness of breath. He later experienced appreciable left sided chest pain, reportedly worse with inspiration and presented to the ED where he was found to have a LLL cavitary lesion.  An AFB smear was positive for TB and MRSA, but TB/MAC PCR was negative. He was discharged with augmentin for 30 days because also growing gram negative rods. He was progressing at home until 10/16 when sharp left sided chest pain and shortness of breath prompted return to the ED at the OSH. Here, he was found to have a PTX which was thought to be 2/2 to his cavitary lesion. A chest tube was placed at that time with resolution of his symptoms; however, throughout his stay he has three recurrences of pneumothoraces requiring additional chest tubes for a total of 3 and return to suction. He reports large upper chest tube was placed most recently prior to his transfer here for second opinion. He reports his symptoms typically resolve with tube placement but recur whenever taken off suction. He denies any travel history or sick contacts that he knows of. He denies fevers, chills, n/v/d or cough. Non-smoker.     Post-Op Info:  Procedure(s) (LRB):  BRONCHOSCOPY, FLEXIBLE, WITH ENDOBRONCHIAL VALVE INSERTION (N/A)         Interval History: No acute changes overnight  On RA  CT with persistent airleak    Medications:  Continuous Infusions:  Scheduled Meds:   enoxaparin  40 mg Subcutaneous Q12H    ferrous sulfate  325 mg Oral Daily    folic acid-vit B6-vit B12 2.5-25-2 mg  1 tablet Oral Daily     senna  8.6 mg Oral Daily     PRN Meds:acetaminophen, albuterol, dextrose 50%, dextrose 50%, glucagon (human recombinant), glucose, glucose, morphine, sodium chloride 0.9%     Review of patient's allergies indicates:   Allergen Reactions    Shellfish containing products      Objective:     Vital Signs (Most Recent):  Temp: 97.7 °F (36.5 °C) (10/27/20 0427)  Pulse: 107 (10/27/20 0427)  Resp: 18 (10/27/20 0533)  BP: 132/66 (10/27/20 0427)  SpO2: 96 % (10/27/20 0427) Vital Signs (24h Range):  Temp:  [96.9 °F (36.1 °C)-98.8 °F (37.1 °C)] 97.7 °F (36.5 °C)  Pulse:  [107-113] 107  Resp:  [16-20] 18  SpO2:  [93 %-98 %] 96 %  BP: (130-142)/(66-79) 132/66     Intake/Output - Last 3 Shifts       10/25 0700 - 10/26 0659 10/26 0700 - 10/27 0659 10/27 0700 - 10/28 0659    P.O. 920 200     Total Intake(mL/kg) 920 (4.6) 200 (1)     Urine (mL/kg/hr) 2350 (0.5) 700 (0.1)     Stool 1      Total Output 2351 700     Net -1431 -500            Stool Occurrence 1 x            SpO2: 96 %  O2 Device (Oxygen Therapy): room air    Physical Exam  Vitals signs reviewed.   Constitutional:       General: He is not in acute distress.     Appearance: Normal appearance. He is obese. He is not ill-appearing.   HENT:      Head: Normocephalic and atraumatic.      Right Ear: External ear normal.      Left Ear: External ear normal.      Nose: Nose normal.      Mouth/Throat:      Pharynx: Oropharynx is clear.   Eyes:      Extraocular Movements: Extraocular movements intact.      Conjunctiva/sclera: Conjunctivae normal.      Pupils: Pupils are equal, round, and reactive to light.   Neck:      Musculoskeletal: Normal range of motion. No neck rigidity or muscular tenderness.   Cardiovascular:      Rate and Rhythm: Normal rate and regular rhythm.      Heart sounds: No murmur. No gallop.    Pulmonary:      Effort: Pulmonary effort is normal. No respiratory distress.      Breath sounds: No wheezing or rhonchi.      Comments: Two test tubes placed left upper  chest  Upper chest tube with serosanguinous output  Lower pigtail catheter with thin serous output, + air leak  Abdominal:      General: There is no distension.      Palpations: Abdomen is soft.      Tenderness: There is no abdominal tenderness. There is no guarding.   Musculoskeletal:         General: No swelling, tenderness or deformity.      Right lower leg: No edema.      Left lower leg: No edema.   Skin:     General: Skin is warm and dry.      Coloration: Skin is not jaundiced.   Neurological:      General: No focal deficit present.      Mental Status: He is alert and oriented to person, place, and time. Mental status is at baseline.      Motor: No weakness.         Significant Labs:  ABGs: No results for input(s): PH, PCO2, PO2, HCO3, POCSATURATED, BE in the last 48 hours.  Amylase: No results for input(s): AMYLASE in the last 48 hours.  BMP:   Recent Labs   Lab 10/27/20  0511   GLU 82   *   K 4.7      CO2 19*   BUN 10   CREATININE 0.8   CALCIUM 9.0     Cardiac markers: No results for input(s): CKMB, CPKMB, TROPONINT, TROPONINI, MYOGLOBIN in the last 48 hours.  CBC:   Recent Labs   Lab 10/27/20  0511   WBC 9.52   RBC 4.26*   HGB 11.8*   HCT 36.9*   *   MCV 87   MCH 27.7   MCHC 32.0     CMP:   Recent Labs   Lab 10/27/20  0511   GLU 82   CALCIUM 9.0   ALBUMIN 3.1*   PROT 8.1   *   K 4.7   CO2 19*      BUN 10   CREATININE 0.8   ALKPHOS 57   ALT 18   AST 27   BILITOT 0.4       Significant Diagnostics:  I have reviewed all pertinent imaging results/findings within the past 24 hours.    VTE Risk Mitigation (From admission, onward)         Ordered     enoxaparin injection 40 mg  Every 12 hours      10/24/20 0049     IP VTE LOW RISK PATIENT  Once      10/23/20 2321     Place sequential compression device  Until discontinued      10/23/20 2321              Assessment/Plan:     * Spontaneous tension pneumothorax  31yo morbidly obese male without any other significant medical history who  presented to [facility] as transfer from OSH for evaluation given recurrent left sided pneumothoraces. Thoracic surgery consulted for evaluation and potential surgical intervention     - Repeat CT chest here suboptimal to evaluate left lung secondary to compression from pneumothorax. However, there is no clear evidence of reported cavitary lesion within the left lower lobe. Scan from [facility] also reviewed - no clear evidence of cavitary lesion.   - Plan for bronchoscopy with endobronchial valve placement tomorrow  - NPO p MN  - Needs repeat COVID today  - Keep both chest tubes to suction   - Daily CXR        Edward Wooten MD  Thoracic Surgery  [facility]

## 2020-10-27 NOTE — ASSESSMENT & PLAN NOTE
30 year old male who presents to [facility] for second opinion regarding multiple occurences of pneumothorax. Hospital course is somewhat unclear and patient does not have records with [place]. Patient reports that he was put on augmentin for a 30 day course which he says he completed most of it. He was reportedly growing MRSA in his sputum and gram negative rods (source unclear). In addition, there are reports of negative TB test via PCR. Patient has reportedly had 3 chest tubes put in him (last one placed prior to transfer). One of which was used to drain the cavitary lesion, he does not know if those grew anything. Patient is comfortable on room air and denies any SOB, current chest pain, fevers, chills, unintentional weight loss, or night sweats. He denies any recent travel, incarceration, work with the homeless population, or outdoor activities. No known sick contacts, no pets at home. He was started on oral clindamycin prior to transfer    - Will need record of hospital course at [place], attempted to acquire however told that micro lab will not give results and that medical records must be contacted, furthermore, no one is available from there on weekends  - Continue observing off antibiotics.  - If patient decompensated, can do broad spectrum vanc and cefepime  - Further recs to follow once review of work-up at [facility] is available

## 2020-10-27 NOTE — ASSESSMENT & PLAN NOTE
29yo morbidly obese male without any other significant medical history who presented to [facility] as transfer from OSH for evaluation given recurrent left sided pneumothoraces. Thoracic surgery consulted for evaluation and potential surgical intervention     - Repeat CT chest here suboptimal to evaluate left lung secondary to compression from pneumothorax. However, there is no clear evidence of reported cavitary lesion within the left lower lobe. Scan from [facility] also reviewed - no clear evidence of cavitary lesion.   - Plan for bronchoscopy with endobronchial valve placement tomorrow  - NPO p MN  - Needs repeat COVID today  - Keep both chest tubes to suction   - Daily CXR

## 2020-10-28 ENCOUNTER — ANESTHESIA (OUTPATIENT)
Dept: SURGERY | Facility: HOSPITAL | Age: 30
DRG: 164 | End: 2020-10-28
Payer: MEDICAID

## 2020-10-28 PROCEDURE — 37000008 HC ANESTHESIA 1ST 15 MINUTES: Performed by: THORACIC SURGERY (CARDIOTHORACIC VASCULAR SURGERY)

## 2020-10-28 PROCEDURE — C1726 CATH, BAL DIL, NON-VASCULAR: HCPCS | Performed by: THORACIC SURGERY (CARDIOTHORACIC VASCULAR SURGERY)

## 2020-10-28 PROCEDURE — 36000707: Performed by: THORACIC SURGERY (CARDIOTHORACIC VASCULAR SURGERY)

## 2020-10-28 PROCEDURE — 27800903 OPTIME MED/SURG SUP & DEVICES OTHER IMPLANTS: Performed by: THORACIC SURGERY (CARDIOTHORACIC VASCULAR SURGERY)

## 2020-10-28 PROCEDURE — D9220A PRA ANESTHESIA: Mod: ANES,,, | Performed by: ANESTHESIOLOGY

## 2020-10-28 PROCEDURE — 25000003 PHARM REV CODE 250: Performed by: STUDENT IN AN ORGANIZED HEALTH CARE EDUCATION/TRAINING PROGRAM

## 2020-10-28 PROCEDURE — D9220A PRA ANESTHESIA: ICD-10-PCS | Mod: ANES,,, | Performed by: ANESTHESIOLOGY

## 2020-10-28 PROCEDURE — D9220A PRA ANESTHESIA: Mod: CRNA,,, | Performed by: STUDENT IN AN ORGANIZED HEALTH CARE EDUCATION/TRAINING PROGRAM

## 2020-10-28 PROCEDURE — C1769 GUIDE WIRE: HCPCS | Performed by: THORACIC SURGERY (CARDIOTHORACIC VASCULAR SURGERY)

## 2020-10-28 PROCEDURE — D9220A PRA ANESTHESIA: ICD-10-PCS | Mod: CRNA,,, | Performed by: STUDENT IN AN ORGANIZED HEALTH CARE EDUCATION/TRAINING PROGRAM

## 2020-10-28 PROCEDURE — 31647 BRONCHIAL VALVE INIT INSERT: CPT | Mod: ,,, | Performed by: THORACIC SURGERY (CARDIOTHORACIC VASCULAR SURGERY)

## 2020-10-28 PROCEDURE — 99232 SBSQ HOSP IP/OBS MODERATE 35: CPT | Mod: ,,, | Performed by: INTERNAL MEDICINE

## 2020-10-28 PROCEDURE — 37000009 HC ANESTHESIA EA ADD 15 MINS: Performed by: THORACIC SURGERY (CARDIOTHORACIC VASCULAR SURGERY)

## 2020-10-28 PROCEDURE — 25000003 PHARM REV CODE 250: Performed by: ANESTHESIOLOGY

## 2020-10-28 PROCEDURE — 11000001 HC ACUTE MED/SURG PRIVATE ROOM

## 2020-10-28 PROCEDURE — 27201423 OPTIME MED/SURG SUP & DEVICES STERILE SUPPLY: Performed by: THORACIC SURGERY (CARDIOTHORACIC VASCULAR SURGERY)

## 2020-10-28 PROCEDURE — 71000016 HC POSTOP RECOV ADDL HR: Performed by: THORACIC SURGERY (CARDIOTHORACIC VASCULAR SURGERY)

## 2020-10-28 PROCEDURE — 99232 PR SUBSEQUENT HOSPITAL CARE,LEVL II: ICD-10-PCS | Mod: ,,, | Performed by: INTERNAL MEDICINE

## 2020-10-28 PROCEDURE — 71000015 HC POSTOP RECOV 1ST HR: Performed by: THORACIC SURGERY (CARDIOTHORACIC VASCULAR SURGERY)

## 2020-10-28 PROCEDURE — 71000039 HC RECOVERY, EACH ADD'L HOUR: Performed by: THORACIC SURGERY (CARDIOTHORACIC VASCULAR SURGERY)

## 2020-10-28 PROCEDURE — 31647 PR BRONCHOSCOPY; VALVE INSERTION - INITIAL LOBE: ICD-10-PCS | Mod: ,,, | Performed by: THORACIC SURGERY (CARDIOTHORACIC VASCULAR SURGERY)

## 2020-10-28 PROCEDURE — 63600175 PHARM REV CODE 636 W HCPCS: Performed by: STUDENT IN AN ORGANIZED HEALTH CARE EDUCATION/TRAINING PROGRAM

## 2020-10-28 PROCEDURE — 36000706: Performed by: THORACIC SURGERY (CARDIOTHORACIC VASCULAR SURGERY)

## 2020-10-28 PROCEDURE — 71000033 HC RECOVERY, INTIAL HOUR: Performed by: THORACIC SURGERY (CARDIOTHORACIC VASCULAR SURGERY)

## 2020-10-28 PROCEDURE — C1757 CATH, THROMBECTOMY/EMBOLECT: HCPCS | Performed by: THORACIC SURGERY (CARDIOTHORACIC VASCULAR SURGERY)

## 2020-10-28 DEVICE — CARTRIDGE VALVE IN 9MM HUD: Type: IMPLANTABLE DEVICE | Site: LUNG | Status: FUNCTIONAL

## 2020-10-28 RX ORDER — FENTANYL CITRATE 50 UG/ML
25 INJECTION, SOLUTION INTRAMUSCULAR; INTRAVENOUS EVERY 5 MIN PRN
Status: DISCONTINUED | OUTPATIENT
Start: 2020-10-28 | End: 2020-10-28 | Stop reason: HOSPADM

## 2020-10-28 RX ORDER — LIDOCAINE HYDROCHLORIDE 20 MG/ML
INJECTION, SOLUTION EPIDURAL; INFILTRATION; INTRACAUDAL; PERINEURAL
Status: DISCONTINUED | OUTPATIENT
Start: 2020-10-28 | End: 2020-10-28

## 2020-10-28 RX ORDER — ROCURONIUM BROMIDE 10 MG/ML
INJECTION, SOLUTION INTRAVENOUS
Status: DISCONTINUED | OUTPATIENT
Start: 2020-10-28 | End: 2020-10-28

## 2020-10-28 RX ORDER — ONDANSETRON 2 MG/ML
4 INJECTION INTRAMUSCULAR; INTRAVENOUS DAILY PRN
Status: DISCONTINUED | OUTPATIENT
Start: 2020-10-28 | End: 2020-10-28 | Stop reason: HOSPADM

## 2020-10-28 RX ORDER — FENTANYL CITRATE 50 UG/ML
INJECTION, SOLUTION INTRAMUSCULAR; INTRAVENOUS
Status: DISCONTINUED | OUTPATIENT
Start: 2020-10-28 | End: 2020-10-28

## 2020-10-28 RX ORDER — ERYTHROMYCIN 5 MG/G
OINTMENT OPHTHALMIC EVERY 4 HOURS PRN
Status: DISCONTINUED | OUTPATIENT
Start: 2020-10-28 | End: 2020-11-01 | Stop reason: HOSPADM

## 2020-10-28 RX ORDER — PROPOFOL 10 MG/ML
VIAL (ML) INTRAVENOUS
Status: DISCONTINUED | OUTPATIENT
Start: 2020-10-28 | End: 2020-10-28

## 2020-10-28 RX ORDER — PROPOFOL 10 MG/ML
VIAL (ML) INTRAVENOUS CONTINUOUS PRN
Status: DISCONTINUED | OUTPATIENT
Start: 2020-10-28 | End: 2020-10-28

## 2020-10-28 RX ORDER — SUCCINYLCHOLINE CHLORIDE 20 MG/ML
INJECTION INTRAMUSCULAR; INTRAVENOUS
Status: DISCONTINUED | OUTPATIENT
Start: 2020-10-28 | End: 2020-10-28

## 2020-10-28 RX ORDER — LIDOCAINE HYDROCHLORIDE 20 MG/ML
INJECTION INTRAVENOUS
Status: DISCONTINUED | OUTPATIENT
Start: 2020-10-28 | End: 2020-10-28

## 2020-10-28 RX ORDER — SODIUM CHLORIDE 0.9 % (FLUSH) 0.9 %
10 SYRINGE (ML) INJECTION
Status: DISCONTINUED | OUTPATIENT
Start: 2020-10-28 | End: 2020-11-01 | Stop reason: HOSPADM

## 2020-10-28 RX ADMIN — LIDOCAINE HYDROCHLORIDE 80 MG: 20 INJECTION, SOLUTION INTRAVENOUS at 03:10

## 2020-10-28 RX ADMIN — ACETAMINOPHEN 650 MG: 325 TABLET ORAL at 10:10

## 2020-10-28 RX ADMIN — LIDOCAINE HYDROCHLORIDE 5 ML: 20 INJECTION, SOLUTION EPIDURAL; INFILTRATION; INTRACAUDAL; PERINEURAL at 02:10

## 2020-10-28 RX ADMIN — PROPOFOL 300 MG: 10 INJECTION, EMULSION INTRAVENOUS at 02:10

## 2020-10-28 RX ADMIN — SODIUM CHLORIDE, SODIUM GLUCONATE, SODIUM ACETATE, POTASSIUM CHLORIDE, MAGNESIUM CHLORIDE, SODIUM PHOSPHATE, DIBASIC, AND POTASSIUM PHOSPHATE: .53; .5; .37; .037; .03; .012; .00082 INJECTION, SOLUTION INTRAVENOUS at 02:10

## 2020-10-28 RX ADMIN — ROCURONIUM BROMIDE 20 MG: 10 INJECTION, SOLUTION INTRAVENOUS at 03:10

## 2020-10-28 RX ADMIN — ERYTHROMYCIN: 5 OINTMENT OPHTHALMIC at 06:10

## 2020-10-28 RX ADMIN — ROCURONIUM BROMIDE 30 MG: 10 INJECTION, SOLUTION INTRAVENOUS at 02:10

## 2020-10-28 RX ADMIN — FENTANYL CITRATE 100 MCG: 50 INJECTION, SOLUTION INTRAMUSCULAR; INTRAVENOUS at 02:10

## 2020-10-28 RX ADMIN — PROPOFOL 50 MCG/KG/MIN: 10 INJECTION, EMULSION INTRAVENOUS at 02:10

## 2020-10-28 RX ADMIN — FENTANYL CITRATE 50 MCG: 50 INJECTION, SOLUTION INTRAMUSCULAR; INTRAVENOUS at 03:10

## 2020-10-28 RX ADMIN — FENTANYL CITRATE 25 MCG: 50 INJECTION, SOLUTION INTRAMUSCULAR; INTRAVENOUS at 03:10

## 2020-10-28 RX ADMIN — SODIUM CHLORIDE, SODIUM GLUCONATE, SODIUM ACETATE, POTASSIUM CHLORIDE, MAGNESIUM CHLORIDE, SODIUM PHOSPHATE, DIBASIC, AND POTASSIUM PHOSPHATE: .53; .5; .37; .037; .03; .012; .00082 INJECTION, SOLUTION INTRAVENOUS at 04:10

## 2020-10-28 RX ADMIN — ERYTHROMYCIN: 5 OINTMENT OPHTHALMIC at 10:10

## 2020-10-28 RX ADMIN — SUCCINYLCHOLINE CHLORIDE 200 MG: 20 INJECTION, SOLUTION INTRAMUSCULAR; INTRAVENOUS at 02:10

## 2020-10-28 NOTE — SUBJECTIVE & OBJECTIVE
Interval History:  NAEON. Patient reports feeling well and denies any constitutional, cardiac, worsening respiratory symptoms, GI or  complaints. Patient reports resolved coughing and wheezing and reports subcutaneous emphysema is still present and is mildly tender to touch. Discussion was had with patient concerning upcoming procedure as well as pending labs concerning infectious disease.     Review of Systems   Constitutional: Negative for chills, fatigue and fever.   HENT: Negative for hearing loss, rhinorrhea and sinus pain.    Eyes: Negative for photophobia and visual disturbance.   Respiratory: Negative for cough, choking, chest tightness, shortness of breath and wheezing.         Tenderness associated with chest tube, subcutaneous emphysema and sutures   Cardiovascular: Negative for chest pain and palpitations.   Gastrointestinal: Negative for abdominal distention, abdominal pain, diarrhea and nausea.   Genitourinary: Negative for difficulty urinating, dysuria, flank pain and hematuria.   Musculoskeletal: Negative for back pain, joint swelling and myalgias.   Skin: Negative for color change and pallor.   Neurological: Negative for dizziness, facial asymmetry, weakness and headaches.   Psychiatric/Behavioral: Negative for agitation and confusion.     Objective:     Vital Signs (Most Recent):  Temp: 98.8 °F (37.1 °C) (10/28/20 1240)  Pulse: (!) 111 (10/28/20 1240)  Resp: 18 (10/28/20 1240)  BP: 124/80 (10/28/20 1240)  SpO2: 95 % (10/28/20 1240) Vital Signs (24h Range):  Temp:  [97.4 °F (36.3 °C)-98.8 °F (37.1 °C)] 98.8 °F (37.1 °C)  Pulse:  [104-111] 111  Resp:  [14-18] 18  SpO2:  [92 %-100 %] 95 %  BP: (124-144)/(72-80) 124/80     Weight: (!) 198.2 kg (437 lb)  Body mass index is 56.11 kg/m².    Intake/Output Summary (Last 24 hours) at 10/28/2020 5645  Last data filed at 10/27/2020 1800  Gross per 24 hour   Intake --   Output 30 ml   Net -30 ml      Physical Exam  Vitals signs and nursing note reviewed.    Constitutional:       General: He is not in acute distress.     Appearance: Normal appearance. He is obese. He is not ill-appearing.   HENT:      Head: Normocephalic and atraumatic.      Right Ear: External ear normal.      Left Ear: External ear normal.      Nose: Nose normal.      Mouth/Throat:      Pharynx: Oropharynx is clear.   Eyes:      Extraocular Movements: Extraocular movements intact.      Conjunctiva/sclera: Conjunctivae normal.      Pupils: Pupils are equal, round, and reactive to light.   Neck:      Musculoskeletal: Normal range of motion. No neck rigidity or muscular tenderness.   Cardiovascular:      Rate and Rhythm: Normal rate and regular rhythm.      Heart sounds: No murmur. No gallop.    Pulmonary:      Effort: Pulmonary effort is normal. No respiratory distress.      Breath sounds: No wheezing or rhonchi.      Comments: Two test tubes placed left upper chest. 1 pig tail tube and 1 thoracostomy tube. Former draining yellow clear fluid other draining serosanguinous fluid, both to suction  Abdominal:      General: There is no distension.      Palpations: Abdomen is soft.      Tenderness: There is no abdominal tenderness. There is no guarding.   Musculoskeletal:         General: No swelling, tenderness or deformity.      Right lower leg: No edema.      Left lower leg: No edema.   Skin:     General: Skin is warm and dry.      Coloration: Skin is not jaundiced.   Neurological:      General: No focal deficit present.      Mental Status: He is alert and oriented to person, place, and time. Mental status is at baseline.      Motor: No weakness.   Psychiatric:         Mood and Affect: Mood normal.         Behavior: Behavior normal.         Significant Labs:   CBC:   Recent Labs   Lab 10/27/20  0511   WBC 9.52   HGB 11.8*   HCT 36.9*   *     CMP:   Recent Labs   Lab 10/27/20  0511   *   K 4.7      CO2 19*   GLU 82   BUN 10   CREATININE 0.8   CALCIUM 9.0   PROT 8.1   ALBUMIN 3.1*    BILITOT 0.4   ALKPHOS 57   AST 27   ALT 18   ANIONGAP 13   EGFRNONAA >60.0       Significant Imaging: I have reviewed all pertinent imaging results/findings within the past 24 hours.

## 2020-10-28 NOTE — TRANSFER OF CARE
"Anesthesia Transfer of Care Note    Patient: Reggie An    Procedure(s) Performed: Procedure(s) (LRB):  BRONCHOSCOPY, FLEXIBLE, WITH ENDOBRONCHIAL VALVE INSERTION (N/A)    Patient location: PACU    Anesthesia Type: general    Transport from OR: Transported from OR on 6-10 L/min O2 by face mask with adequate spontaneous ventilation    Post pain: adequate analgesia    Post assessment: no apparent anesthetic complications and tolerated procedure well    Post vital signs: stable    Level of consciousness: awake and alert    Nausea/Vomiting: no nausea/vomiting    Complications: none    Transfer of care protocol was followed      Last vitals:   Visit Vitals  /80   Pulse (!) 111   Temp 37.1 °C (98.8 °F)   Resp 18   Ht 6' 2" (1.88 m)   Wt (!) 198.2 kg (437 lb)   SpO2 95%   BMI 56.11 kg/m²     "

## 2020-10-28 NOTE — ANESTHESIA PROCEDURE NOTES
Intubation  Performed by: Anson Carroll MD  Authorized by: Anson Carroll MD     Intubation:     Induction:  Intravenous    Intubated:  Postinduction    Mask Ventilation:  N/a    Attempts:  1    Attempted By:  Staff anesthesiologist    Method of Intubation:  Video laryngoscopy    Blade:  Edwin 4    Laryngeal View Grade: Grade I - full view of chords      Difficult Airway Encountered?: No      Complications:  None    Airway Device:  Oral endotracheal tube    Airway Device Size:  9.0    Style/Cuff Inflation:  Cuffed    Inflation Amount (mL):  5    Tube secured:  24    Secured at:  The lips    Placement Verified By:  Capnometry    Complicating Factors:  None    Findings Post-Intubation:  BS equal bilateral

## 2020-10-28 NOTE — PROGRESS NOTES
[facility]  Thoracic Surgery  Progress Note    Subjective:     History of Present Illness:  Patient is a 29yo morbidly obese male without any other significant medical history who presented to [facility] as transfer from [facility] for evaluation given recurrent left sided pneumothoraces. Patient reports prior to mid-September he was in his usual state of health. Around 9/20 he developed flu-like symptoms with fatigue, congestion and some shortness of breath. He later experienced appreciable left sided chest pain, reportedly worse with inspiration and presented to the ED where he was found to have a LLL cavitary lesion.  An AFB smear was positive for TB and MRSA, but TB/MAC PCR was negative. He was discharged with augmentin for 30 days because also growing gram negative rods. He was progressing at home until 10/16 when sharp left sided chest pain and shortness of breath prompted return to the ED at the OSH. Here, he was found to have a PTX which was thought to be 2/2 to his cavitary lesion. A chest tube was placed at that time with resolution of his symptoms; however, throughout his stay he has three recurrences of pneumothoraces requiring additional chest tubes for a total of 3 and return to suction. He reports large upper chest tube was placed most recently prior to his transfer here for second opinion. He reports his symptoms typically resolve with tube placement but recur whenever taken off suction. He denies any travel history or sick contacts that he knows of. He denies fevers, chills, n/v/d or cough. Non-smoker.     Post-Op Info:  Procedure(s) (LRB):  BRONCHOSCOPY, FLEXIBLE, WITH ENDOBRONCHIAL VALVE INSERTION (N/A)         Interval History: NAEON. Hemodynamically stable on room air.     Medications:  Continuous Infusions:  Scheduled Meds:   ferrous sulfate  325 mg Oral Daily    folic acid-vit B6-vit B12 2.5-25-2 mg  1 tablet Oral Daily    senna  8.6 mg Oral Daily     PRN Meds:acetaminophen,  albuterol, dextrose 50%, dextrose 50%, glucagon (human recombinant), glucose, glucose, morphine, sodium chloride 0.9%     Review of patient's allergies indicates:   Allergen Reactions    Bactrim [sulfamethoxazole-trimethoprim]      G6PD     Shellfish containing products      Objective:     Vital Signs (Most Recent):  Temp: 98.3 °F (36.8 °C) (10/28/20 0801)  Pulse: 109 (10/28/20 0801)  Resp: 14 (10/28/20 0801)  BP: 130/72 (10/28/20 0801)  SpO2: 97 % (10/28/20 0801) Vital Signs (24h Range):  Temp:  [97.4 °F (36.3 °C)-98.9 °F (37.2 °C)] 98.3 °F (36.8 °C)  Pulse:  [104-113] 109  Resp:  [14-18] 14  SpO2:  [92 %-100 %] 97 %  BP: (128-144)/(72-80) 130/72     Intake/Output - Last 3 Shifts       10/26 0700 - 10/27 0659 10/27 0700 - 10/28 0659 10/28 0700 - 10/29 0659    P.O. 200      Total Intake(mL/kg) 200 (1)      Urine (mL/kg/hr) 700 (0.1) 875 (0.2)     Stool       Chest Tube  30     Total Output 700 905     Net -500 -905                  SpO2: 97 %  O2 Device (Oxygen Therapy): room air    Physical Exam  Vitals signs reviewed.   Constitutional:       General: He is not in acute distress.     Appearance: Normal appearance. He is obese. He is not ill-appearing.   HENT:      Head: Normocephalic and atraumatic.      Right Ear: External ear normal.      Left Ear: External ear normal.      Nose: Nose normal.      Mouth/Throat:      Pharynx: Oropharynx is clear.   Eyes:      Extraocular Movements: Extraocular movements intact.      Conjunctiva/sclera: Conjunctivae normal.      Pupils: Pupils are equal, round, and reactive to light.   Neck:      Musculoskeletal: Normal range of motion. No neck rigidity or muscular tenderness.   Cardiovascular:      Rate and Rhythm: Normal rate and regular rhythm.      Heart sounds: No murmur. No gallop.    Pulmonary:      Effort: Pulmonary effort is normal. No respiratory distress.      Breath sounds: No wheezing or rhonchi.      Comments: Two test tubes placed left upper chest  Upper chest tube  with serosanguinous output  Lower pigtail catheter with thin serous output, + air leak  Abdominal:      General: There is no distension.      Palpations: Abdomen is soft.      Tenderness: There is no abdominal tenderness. There is no guarding.   Musculoskeletal:         General: No swelling, tenderness or deformity.      Right lower leg: No edema.      Left lower leg: No edema.   Skin:     General: Skin is warm and dry.      Coloration: Skin is not jaundiced.   Neurological:      General: No focal deficit present.      Mental Status: He is alert and oriented to person, place, and time. Mental status is at baseline.      Motor: No weakness.         Significant Labs:  BMP:   Recent Labs   Lab 10/27/20  0511   GLU 82   *   K 4.7      CO2 19*   BUN 10   CREATININE 0.8   CALCIUM 9.0     CBC:   Recent Labs   Lab 10/27/20  0511   WBC 9.52   RBC 4.26*   HGB 11.8*   HCT 36.9*   *   MCV 87   MCH 27.7   MCHC 32.0     CMP:   Recent Labs   Lab 10/27/20  0511   GLU 82   CALCIUM 9.0   ALBUMIN 3.1*   PROT 8.1   *   K 4.7   CO2 19*      BUN 10   CREATININE 0.8   ALKPHOS 57   ALT 18   AST 27   BILITOT 0.4       Significant Diagnostics:  CXR: I have reviewed all pertinent results/findings within the past 24 hours    VTE Risk Mitigation (From admission, onward)         Ordered     IP VTE LOW RISK PATIENT  Once      10/23/20 2321     Place sequential compression device  Until discontinued      10/23/20 2321              Assessment/Plan:     * Spontaneous tension pneumothorax  29yo morbidly obese male without any other significant medical history who presented to [facility] as transfer from OSH for evaluation given recurrent left sided pneumothoraces. Thoracic surgery consulted for evaluation and potential surgical intervention     - Repeat CT chest here suboptimal to evaluate left lung secondary to compression from pneumothorax. However, there is no clear evidence of reported cavitary lesion within the left lower  lobe. Scan from [facility] also reviewed - no clear evidence of cavitary lesion.   - To OR today for bronchoscopy with endobronchial valve placement tomorrow. May exchange chest tube if possible.  - NPO   - Keep both chest tubes to suction   - Daily CXR        FRACISCO Barillas  Thoracic Surgery  [facility]

## 2020-10-28 NOTE — SUBJECTIVE & OBJECTIVE
Interval History: NAEON. Hemodynamically stable on room air.     Medications:  Continuous Infusions:  Scheduled Meds:   ferrous sulfate  325 mg Oral Daily    folic acid-vit B6-vit B12 2.5-25-2 mg  1 tablet Oral Daily    senna  8.6 mg Oral Daily     PRN Meds:acetaminophen, albuterol, dextrose 50%, dextrose 50%, glucagon (human recombinant), glucose, glucose, morphine, sodium chloride 0.9%     Review of patient's allergies indicates:   Allergen Reactions    Bactrim [sulfamethoxazole-trimethoprim]      G6PD     Shellfish containing products      Objective:     Vital Signs (Most Recent):  Temp: 98.3 °F (36.8 °C) (10/28/20 0801)  Pulse: 109 (10/28/20 0801)  Resp: 14 (10/28/20 0801)  BP: 130/72 (10/28/20 0801)  SpO2: 97 % (10/28/20 0801) Vital Signs (24h Range):  Temp:  [97.4 °F (36.3 °C)-98.9 °F (37.2 °C)] 98.3 °F (36.8 °C)  Pulse:  [104-113] 109  Resp:  [14-18] 14  SpO2:  [92 %-100 %] 97 %  BP: (128-144)/(72-80) 130/72     Intake/Output - Last 3 Shifts       10/26 0700 - 10/27 0659 10/27 0700 - 10/28 0659 10/28 0700 - 10/29 0659    P.O. 200      Total Intake(mL/kg) 200 (1)      Urine (mL/kg/hr) 700 (0.1) 875 (0.2)     Stool       Chest Tube  30     Total Output 700 905     Net -500 -905                  SpO2: 97 %  O2 Device (Oxygen Therapy): room air    Physical Exam  Vitals signs reviewed.   Constitutional:       General: He is not in acute distress.     Appearance: Normal appearance. He is obese. He is not ill-appearing.   HENT:      Head: Normocephalic and atraumatic.      Right Ear: External ear normal.      Left Ear: External ear normal.      Nose: Nose normal.      Mouth/Throat:      Pharynx: Oropharynx is clear.   Eyes:      Extraocular Movements: Extraocular movements intact.      Conjunctiva/sclera: Conjunctivae normal.      Pupils: Pupils are equal, round, and reactive to light.   Neck:      Musculoskeletal: Normal range of motion. No neck rigidity or muscular tenderness.   Cardiovascular:      Rate and  Rhythm: Normal rate and regular rhythm.      Heart sounds: No murmur. No gallop.    Pulmonary:      Effort: Pulmonary effort is normal. No respiratory distress.      Breath sounds: No wheezing or rhonchi.      Comments: Two test tubes placed left upper chest  Upper chest tube with serosanguinous output  Lower pigtail catheter with thin serous output, + air leak  Abdominal:      General: There is no distension.      Palpations: Abdomen is soft.      Tenderness: There is no abdominal tenderness. There is no guarding.   Musculoskeletal:         General: No swelling, tenderness or deformity.      Right lower leg: No edema.      Left lower leg: No edema.   Skin:     General: Skin is warm and dry.      Coloration: Skin is not jaundiced.   Neurological:      General: No focal deficit present.      Mental Status: He is alert and oriented to person, place, and time. Mental status is at baseline.      Motor: No weakness.         Significant Labs:  BMP:   Recent Labs   Lab 10/27/20  0511   GLU 82   *   K 4.7      CO2 19*   BUN 10   CREATININE 0.8   CALCIUM 9.0     CBC:   Recent Labs   Lab 10/27/20  0511   WBC 9.52   RBC 4.26*   HGB 11.8*   HCT 36.9*   *   MCV 87   MCH 27.7   MCHC 32.0     CMP:   Recent Labs   Lab 10/27/20  0511   GLU 82   CALCIUM 9.0   ALBUMIN 3.1*   PROT 8.1   *   K 4.7   CO2 19*      BUN 10   CREATININE 0.8   ALKPHOS 57   ALT 18   AST 27   BILITOT 0.4       Significant Diagnostics:  CXR: I have reviewed all pertinent results/findings within the past 24 hours    VTE Risk Mitigation (From admission, onward)         Ordered     IP VTE LOW RISK PATIENT  Once      10/23/20 2321     Place sequential compression device  Until discontinued      10/23/20 2321

## 2020-10-28 NOTE — PROGRESS NOTES
Notified Dr Cardona that pt c/o of irritation to Rt eye. Rt eye is red. New order for antibiotic drop and pt can transfer back to the floor.

## 2020-10-28 NOTE — NURSING TRANSFER
Nursing Transfer Note      10/28/2020     Transfer To: 632    Transfer via bed    Transfer with CHEST TUBES pt previously had prior to surgery    Transported by transport    Medicines sent: n/a    Chart send with patient: Yes    Notified: n/a    Patient reassessed at: 10/28/2020    Upon arrival to floor:

## 2020-10-28 NOTE — ASSESSMENT & PLAN NOTE
Patient is a 29 yo M with obesity who presented as a transfer for complaints of SOB and fatigue and was found to have recurrent tension pneumothorax with chest tube placement. Currently with 2 (1 pigtail and 1 thoracostomy tube) chest tubes after initial one failed. Initially tension pneumothorax presumably due to his infectious cavitary lesion. Here for second opinion. Patient develops symptoms after clamped. On 10/20, CT showed large left pneumothroax with mediastinal shift to right and large volume subcutaneous emphysema in left chest wall. Subsequent CXR showed adequate expansion after Chest tube adjusment. Repeat CXR here at [facility] show's small apical left sided pneumothorax due to his cavitary lesion. Review of previous CT from [facility] and CT done this admission show no signs of cavitary lesion. Daily CXR show unchanged pneumothorax and persistent subcutaneous emphysema. Patient continues to have serosanguinous drainage without any acute changes and denies any new SOB or respiratory complaints.    Plan  - bronchoscopy with endobronchial valve placement   - Chest tubes placed to suction, drains on side of bed  - Consult thoracic surgery for recommendations   - Pain regimen added   - Supplemental O2 as needed  - PRN albuterol for wheezing  - Daily CXR's

## 2020-10-28 NOTE — ASSESSMENT & PLAN NOTE
31yo morbidly obese male without any other significant medical history who [place] [facility] [place] for evaluation given recurrent left sided pneumothoraces. Thoracic surgery consulted for evaluation and potential surgical intervention     - Repeat CT chest here suboptimal to evaluate left lung secondary to compression from pneumothorax. However, there is no clear evidence of reported cavitary lesion within the left lower lobe. Scan from [facility] also reviewed - no clear evidence of cavitary lesion.   - To OR today for bronchoscopy with endobronchial valve placement tomorrow. May exchange chest tube if possible.  - NPO   - Keep both chest tubes to suction   - Daily CXR

## 2020-10-28 NOTE — MEDICAL/APP STUDENT
Progress Note  Hospital Medicine    Primary Team: [facility] 2  Admit Date: 10/23/2020   Length of Stay:  LOS: 5 days   SUBJECTIVE:   Reason for Admission:  Spontaneous tension pneumothorax    HPI/Interval history:    Patient is a 29 yo man with morbid obesity presenting with L chest pain and SOB since 10/16, transfer from [facility] for second opinion. In late Sept. patient reported flu-like sx--fatigue, nasal congestion, SOB with some improvement, but around 9/20 developed significant L sided chest pain worse with inspiration warranting visit to ED. LLL cavitary lesion was discovered, AFB smear +TB and MRSA, but TB/MAC PCR negative. DC with augmentin for 30d due to additional growth of GNRs.     On 10/16, patient developed L sided chest pain and SOB and went to [facility] ER. Pneumothorax likely 2/2 cavitary lesion that improved with chest tube. Chest pain and SOB recurred during his hospital stay and imaging showed recurrent pneumothorax requiring placement of a second chest tube.       NAEON.    Review of Systems:  Constitutional: no fever or chills  Respiratory: no cough or shortness of breath  Cardiovascular: no chest pain or palpitations  Gastrointestinal: no nausea or vomiting, no abdominal pain or change in bowel habits     OBJECTIVE:     Temp:  [97.4 °F (36.3 °C)-98.7 °F (37.1 °C)]   Pulse:  [104-110]   Resp:  [14-18]   BP: (128-144)/(72-80)   SpO2:  [92 %-100 %]  Body mass index is 56.11 kg/m².  Intake/Outake:  This Shift:  No intake/output data recorded.    Net I/O past 24h:     Intake/Output Summary (Last 24 hours) at 10/28/2020 1217  Last data filed at 10/27/2020 1800  Gross per 24 hour   Intake --   Output 380 ml   Net -380 ml             Physical Exam:  Physical exam not completed today.    Laboratory:  CBC/Anemia Labs: Coags:    Recent Labs   Lab 10/24/20  0405 10/24/20  0719 10/25/20  0413 10/27/20  0511   WBC 9.45  --  8.95 9.52   HGB 10.8*  --  10.5* 11.8*   HCT 37.0*  --  34.5* 36.9*     --   331 377*   MCV 91  --  88 87   RDW 14.1  --  14.2 14.3   IRON  --  17*  --   --    FERRITIN  --  217  --   --    FOLATE  --  3.8*  --   --    [id number]  --  492  --   --     Recent Labs   Lab 10/24/20  0010 10/24/20  0719   INR 4.0* 1.0   APTT 77.2*  --         Chemistries:   Recent Labs   Lab 10/24/20  0404 10/24/20  0405 10/25/20  0413 10/27/20  0511   *  --  137 133*   K 4.4  --  4.1 4.7     --  101 101   CO2 27  --  26 19*   BUN 11  --  11 10   CREATININE 0.8 0.8 0.8 0.8   CALCIUM 9.3  --  9.0 9.0   PROT 8.3  --  7.7 8.1   BILITOT 0.4  --  0.5 0.4   ALKPHOS 63  --  59 57   ALT 17  --  13 18   AST 16  --  12 27   MG 1.8  --  1.9  --    PHOS 4.8*  --  4.6*  --         Medications:  Scheduled Meds:   ferrous sulfate  325 mg Oral Daily    folic acid-vit B6-vit B12 2.5-25-2 mg  1 tablet Oral Daily    senna  8.6 mg Oral Daily                             Continuous Infusions:  PRN Meds:.acetaminophen, albuterol, dextrose 50%, dextrose 50%, glucagon (human recombinant), glucose, glucose, morphine, sodium chloride 0.9%     ASSESSMENT/PLAN:   Patient is a 29 yo man presenting with recurring, spontaneous tension pneumothorax, cavitary lesion of lung, morbid obesity with BMI 50-59.9, iron deficiency anemia, folate deficiency anemia, G6PD deficiency.     Spontaneous Tension Pneumothorax  -CXR 10/28: L sided small pneumothorax, not significantly changed from prior exam. Pigtail catheter overlying inferior L hemithorax and L sided chest tube in place.  -Daily CXR  -Supplemental O2 prn  -CT surgery consulted for LLL cavitary lesion noted on 9/21 CT. They await [facility] records to determine whether surgical intervention is indicated.  -Thoracic surgery service reviewed [facility] CT images. No evidence of cavitary lesion. Bronchoscopy with possible endobronchial valve placement scheduled this afternoon.  -NPO since midnight in prep for bronchoscopy.     Cavitary Lesion of Lung  -ID consulted and signed off at this  time. Awaiting [facility] records for review of prior ID workup. As patient has been on Abx for approximately a month, new sputum cx would offer limited insights into ongoing pathology.   -DC Clindamycin that was started at transferring facility on the day prior to admission. If decompensation, start broad spectrum vancomycin, cefepime  -Check AFB sputum x3  -Hepatitis A,B,C negative  -HIV negative   -Fungal cx, AFB need to be collected  -Quantiferon WNL   -Quantiferon WNL  -TTE ordered given past Hx of +MRSA sputum culture. Study quality technically inadequate. Biventricular function appears normal  -Blood Cx: NGTD     Morbid obesity with BMI 50-59.9  -Patient states that he feels more compelled to start weight loss now. Believes some of his current issues could be attributed to obesity.  -Diabetic diet     Iron deficiency anemia  -Ferrous sultfate EC 325mg po qd  -[id number]     Folate deficiency anemia  -Folic acid-vitB6-vitB12 2.5-25-2mg po qd  -q48h CBC    G6PD Deficiency  -Patient informed of diagnosis, he was unaware. Test done due to possibility of starting TB or antifungal regimens.    Active Hospital Problems    Diagnosis  POA    *Spontaneous tension pneumothorax [J93.0]  Yes    Secondary spontaneous pneumothorax [J93.12]  Yes    G6PD deficiency [D75.A]  Yes    Iron deficiency anemia [D50.9]  Yes    Folate deficiency anemia [D52.9]  Yes    Morbid obesity with body mass index of 50.0-59.9 in adult [E66.01, Z68.43]  Not Applicable    Cavitary lesion of lung [J98.4]  Yes      Resolved Hospital Problems   No resolved problems to display.     DVT ppx- Enoxaparin 40mg subQ q12h  CODE Status- Full    Dispo-Discharge pending bronchoscopy and evaluating whether procedure provides symptomatic relief from recurrent tension pneumothoraces.     Clarence Jones, MS-4  Hospital Medicine Team 2

## 2020-10-28 NOTE — PLAN OF CARE
Pt resting in bed VS stable chest tubes continuous suction intact and patent NPO r/t bronchoscopy in am  call button within reach no acute distress noted   Problem: Adult Inpatient Plan of Care  Goal: Plan of Care Review  Outcome: Ongoing, Progressing  Goal: Patient-Specific Goal (Individualization)  Outcome: Ongoing, Progressing  Goal: Absence of Hospital-Acquired Illness or Injury  Outcome: Ongoing, Progressing  Goal: Optimal Comfort and Wellbeing  Outcome: Ongoing, Progressing  Goal: Readiness for Transition of Care  Outcome: Ongoing, Progressing  Goal: Rounds/Family Conference  Outcome: Ongoing, Progressing

## 2020-10-28 NOTE — ASSESSMENT & PLAN NOTE
Sputum cultures at [facility] MRSA+, + 1/3 AFB cultures, TB/MAC PCR negative.   9/21 at [facility], CT chest demonstrated a 5.7 x 4.4 cm left lower lobe cavitary lesion. Was initially treated at [facility] with augmentin after growing gram negative rods on cultures, however was also growing MRSA. Today he was started on PO clindamycin at [facility] today. On 10/20 CT surgery was consulted for wedge vs lobectomy, but no surgical intervention was done due to no significant blebs and likely infectious etiology. Records from [facility] showed no sign of cavitary lesion and  plans to proceed with bronchoscopy and endobronchial valve placement in AM on 10/28. Patient continues to be stable and afebrile while off antibiotics and will likely not require abx this admission.     Plan  - Follow up with ID outpatient   - Chest tube drainage fluid cultures, blood cultures and respiratory cultures will be of limited value at this point since patient has been treated with antibiotics for past month; however, will redraw drainage fluid cultures for now.   - [facility] did draw tube drainage bacterial, fungal and AFB cultures  - Daily CXR's  - Will monitor patient off abx

## 2020-10-28 NOTE — PROGRESS NOTES
[facility] Hospital Medicine  Progress Note    Patient Name: Reggie An  MRN: [id number]  Patient Class: IP- Inpatient   Admission Date: 10/23/2020  Length of Stay: 5 days  Attending Physician: Anson Bustos MD  Primary Care Provider: Primary Doctor [facility] Medicine Team: [facility] Ritesh Novoa MD    Subjective:     Principal Problem:Spontaneous tension pneumothorax        HPI:  Mr. An is a 30 y.o. M with morbid obesity presenting of left sided chest pain and SOB since 10/16. He was transferred here from [facility] for a second opinion. Around the week prior to 9/20, patient reported flu like symptoms such as fatigue, some nasal congestion and SOB. He said he started to feel better and his cold symptoms were improving but Around 9/20 he developed significant left sided chest pain, worse with inspiration. He went to the ER and was found to have a LLL cavitary lesion. An AFB smear was positive for TB and MRSA, but TB/MAC PCR was negative. He was discharged with augmentin for 30 days because also growing gram negative rods. About another week goes by and his left sided chest pain and SOB comes back. He goes back to [facility] ER on 10/16 and was found to have a pneumothroax likely secondary to his cavitary lesion. A chest tube was placed with improvement of symptoms. However, during his stay he had intermittent episodes of chest pain and sob and repeat imaging showed recurring pneumothorax. Another chest tube was placed and he was transferred [place] [facility] [place] opinion regarding recurrent pneumothorax. He states that when ever his chest tube is clamped he develops symptoms. He denies any travel history or sick contacts that he knows of. He denies fevers, chills, n/v/d or cough.            Overview/Hospital Course:  Patient was admitted for evaluation of possible cavitary lesion and is being followed by general surgery and infectious disease for recommendations. Records from [facility] were  obtained which included previous CT which showed no sign of cavitary lesion. Patient taken to be taken to OR on 10/28 for bronchoscopy and endobronchial valve placement.     Interval History:  NAEON. Patient reports feeling well and denies any constitutional, cardiac, worsening respiratory symptoms, GI or  complaints. Patient reports resolved coughing and wheezing and reports subcutaneous emphysema is still present and is mildly tender to touch. Discussion was had with patient concerning upcoming procedure as well as pending labs concerning infectious disease.     Review of Systems   Constitutional: Negative for chills, fatigue and fever.   HENT: Negative for hearing loss, rhinorrhea and sinus pain.    Eyes: Negative for photophobia and visual disturbance.   Respiratory: Negative for cough, choking, chest tightness, shortness of breath and wheezing.         Tenderness associated with chest tube, subcutaneous emphysema and sutures   Cardiovascular: Negative for chest pain and palpitations.   Gastrointestinal: Negative for abdominal distention, abdominal pain, diarrhea and nausea.   Genitourinary: Negative for difficulty urinating, dysuria, flank pain and hematuria.   Musculoskeletal: Negative for back pain, joint swelling and myalgias.   Skin: Negative for color change and pallor.   Neurological: Negative for dizziness, facial asymmetry, weakness and headaches.   Psychiatric/Behavioral: Negative for agitation and confusion.     Objective:     Vital Signs (Most Recent):  Temp: 98.8 °F (37.1 °C) (10/28/20 1240)  Pulse: (!) 111 (10/28/20 1240)  Resp: 18 (10/28/20 1240)  BP: 124/80 (10/28/20 1240)  SpO2: 95 % (10/28/20 1240) Vital Signs (24h Range):  Temp:  [97.4 °F (36.3 °C)-98.8 °F (37.1 °C)] 98.8 °F (37.1 °C)  Pulse:  [104-111] 111  Resp:  [14-18] 18  SpO2:  [92 %-100 %] 95 %  BP: (124-144)/(72-80) 124/80     Weight: (!) 198.2 kg (437 lb)  Body mass index is 56.11 kg/m².    Intake/Output Summary (Last 24 hours) at  10/28/2020 1459  Last data filed at 10/27/2020 1800  Gross per 24 hour   Intake --   Output 30 ml   Net -30 ml      Physical Exam  Vitals signs and nursing note reviewed.   Constitutional:       General: He is not in acute distress.     Appearance: Normal appearance. He is obese. He is not ill-appearing.   HENT:      Head: Normocephalic and atraumatic.      Right Ear: External ear normal.      Left Ear: External ear normal.      Nose: Nose normal.      Mouth/Throat:      Pharynx: Oropharynx is clear.   Eyes:      Extraocular Movements: Extraocular movements intact.      Conjunctiva/sclera: Conjunctivae normal.      Pupils: Pupils are equal, round, and reactive to light.   Neck:      Musculoskeletal: Normal range of motion. No neck rigidity or muscular tenderness.   Cardiovascular:      Rate and Rhythm: Normal rate and regular rhythm.      Heart sounds: No murmur. No gallop.    Pulmonary:      Effort: Pulmonary effort is normal. No respiratory distress.      Breath sounds: No wheezing or rhonchi.      Comments: Two test tubes placed left upper chest. 1 pig tail tube and 1 thoracostomy tube. Former draining yellow clear fluid other draining serosanguinous fluid, both to suction  Abdominal:      General: There is no distension.      Palpations: Abdomen is soft.      Tenderness: There is no abdominal tenderness. There is no guarding.   Musculoskeletal:         General: No swelling, tenderness or deformity.      Right lower leg: No edema.      Left lower leg: No edema.   Skin:     General: Skin is warm and dry.      Coloration: Skin is not jaundiced.   Neurological:      General: No focal deficit present.      Mental Status: He is alert and oriented to person, place, and time. Mental status is at baseline.      Motor: No weakness.   Psychiatric:         Mood and Affect: Mood normal.         Behavior: Behavior normal.         Significant Labs:   CBC:   Recent Labs   Lab 10/27/20  0511   WBC 9.52   HGB 11.8*   HCT 36.9*    *     CMP:   Recent Labs   Lab 10/27/20  0511   *   K 4.7      CO2 19*   GLU 82   BUN 10   CREATININE 0.8   CALCIUM 9.0   PROT 8.1   ALBUMIN 3.1*   BILITOT 0.4   ALKPHOS 57   AST 27   ALT 18   ANIONGAP 13   EGFRNONAA >60.0       Significant Imaging: I have reviewed all pertinent imaging results/findings within the past 24 hours.      Assessment/Plan:      * Spontaneous tension pneumothorax  Patient is a 31 yo M with obesity who presented as a transfer for complaints of SOB and fatigue and was found to have recurrent tension pneumothorax with chest tube placement. Currently with 2 (1 pigtail and 1 thoracostomy tube) chest tubes after initial one failed. Initially tension pneumothorax presumably due to his infectious cavitary lesion. Here for second opinion. Patient develops symptoms after clamped. On 10/20, CT showed large left pneumothroax with mediastinal shift to right and large volume subcutaneous emphysema in left chest wall. Subsequent CXR showed adequate expansion after Chest tube adjusment. [place] [facility] [place] small apical left sided pneumothorax due to his cavitary lesion. Review of previous CT from [facility] and CT done this admission show no signs of cavitary lesion. Daily CXR show unchanged pneumothorax and persistent subcutaneous emphysema. Patient continues to have serosanguinous drainage without any acute changes and denies any new SOB or respiratory complaints.    Plan  - bronchoscopy with endobronchial valve placement   - Chest tubes placed to suction, drains on side of bed  - Consult thoracic surgery for recommendations   - Pain regimen added   - Supplemental O2 as needed  - PRN albuterol for wheezing  - Daily CXR's        Cavitary lesion of lung  Sputum cultures at [facility] MRSA+, + 1/3 AFB cultures, TB/MAC PCR negative.   9/21 at [facility], CT chest demonstrated a 5.7 x 4.4 cm left lower lobe cavitary lesion. Was initially treated at [facility] with augmentin after growing gram  negative rods on cultures, however was also growing MRSA. Today he was started on PO clindamycin at [facility] today. On 10/20 CT surgery was consulted for wedge vs lobectomy, but no surgical intervention was done due to no significant blebs and likely infectious etiology. Records from [facility] showed no sign of cavitary lesion and  plans to proceed with bronchoscopy and endobronchial valve placement in AM on 10/28. Patient continues to be stable and afebrile while off antibiotics and will likely not require abx this admission.     Plan  - Follow up with ID outpatient   - Chest tube drainage fluid cultures, blood cultures and respiratory cultures will be of limited value at this point since patient has been treated with antibiotics for past month; however, will redraw drainage fluid cultures for now.   - [facility] did draw tube drainage bacterial, fungal and AFB cultures  - Daily CXR's  - Will monitor patient off abx      Secondary spontaneous pneumothorax  See spontaneous pneumothorax      G6PD deficiency  Patient found to have G6PD level of 2.4 with normal value of 7-25. Patient was tested based on possible treatment with oxidizing agent for TB or cavitary lesions. Allergies to Sulfa placed in chart.    Plan  - Avoid oxidizing agents and monitor for signs of worsening anemia    Folate deficiency anemia  - continue PO Folate      Iron deficiency anemia  - continue PO Iron       Morbid obesity with body mass index of 50.0-59.9 in adult  - diabetic diet        VTE Risk Mitigation (From admission, onward)         Ordered     IP VTE LOW RISK PATIENT  Once      10/23/20 2321     Place sequential compression device  Until discontinued      10/23/20 2321                Discharge Planning   RAMESH: 10/30/2020     Code Status: Full Code   Is the patient medically ready for discharge?:     Reason for patient still in hospital (select all that apply): Treatment and Consult recommendations  Discharge Plan A: Home                  [place]  EFE Novoa MD  Department of Hospital Medicine   [facility]

## 2020-10-28 NOTE — BRIEF OP NOTE
[facility]  Brief Operative Note    SUMMARY     Surgery Date: 10/28/2020     Surgeon(s) and Role:     * Dmitriy Benitez MD - Primary     * Edward Wooten MD - Resident - Assisting    Pre-op Diagnosis:  Secondary spontaneous pneumothorax [J93.12]    Post-op Diagnosis:  Post-Op Diagnosis Codes:     * Secondary spontaneous pneumothorax [J93.12]    Procedure(s) (LRB):  BRONCHOSCOPY, FLEXIBLE, WITH ENDOBRONCHIAL VALVE INSERTION (N/A)    Anesthesia: General    Description of Procedure: Flexible bronchoscopy and insertion of 9mm endobronchial valve in basilar segmental bronchus of LLL    Description of the findings of the procedure: As above    Estimated Blood Loss: 1mL    Estimated Blood Loss has been documented.         Specimens:   Specimen (12h ago, onward)    None          [id number]

## 2020-10-29 LAB
ALBUMIN SERPL BCP-MCNC: 3.4 G/DL (ref 3.5–5.2)
ALP SERPL-CCNC: 59 U/L (ref 55–135)
ALT SERPL W/O P-5'-P-CCNC: 21 U/L (ref 10–44)
ANION GAP SERPL CALC-SCNC: 9 MMOL/L (ref 8–16)
AST SERPL-CCNC: 19 U/L (ref 10–40)
BACTERIA BLD CULT: NORMAL
BACTERIA BLD CULT: NORMAL
BASOPHILS # BLD AUTO: 0.06 K/UL (ref 0–0.2)
BASOPHILS NFR BLD: 0.6 % (ref 0–1.9)
BILIRUB SERPL-MCNC: 0.3 MG/DL (ref 0.1–1)
BUN SERPL-MCNC: 10 MG/DL (ref 6–20)
CALCIUM SERPL-MCNC: 9.9 MG/DL (ref 8.7–10.5)
CHLORIDE SERPL-SCNC: 100 MMOL/L (ref 95–110)
CO2 SERPL-SCNC: 25 MMOL/L (ref 23–29)
CREAT SERPL-MCNC: 0.8 MG/DL (ref 0.5–1.4)
DIFFERENTIAL METHOD: ABNORMAL
EOSINOPHIL # BLD AUTO: 0.4 K/UL (ref 0–0.5)
EOSINOPHIL NFR BLD: 4 % (ref 0–8)
ERYTHROCYTE [DISTWIDTH] IN BLOOD BY AUTOMATED COUNT: 14.1 % (ref 11.5–14.5)
EST. GFR  (AFRICAN AMERICAN): >60 ML/MIN/1.73 M^2
EST. GFR  (NON AFRICAN AMERICAN): >60 ML/MIN/1.73 M^2
GLUCOSE SERPL-MCNC: 89 MG/DL (ref 70–110)
HCT VFR BLD AUTO: 38.5 % (ref 40–54)
HGB BLD-MCNC: 11.7 G/DL (ref 14–18)
IMM GRANULOCYTES # BLD AUTO: 0.07 K/UL (ref 0–0.04)
IMM GRANULOCYTES NFR BLD AUTO: 0.7 % (ref 0–0.5)
LYMPHOCYTES # BLD AUTO: 2.5 K/UL (ref 1–4.8)
LYMPHOCYTES NFR BLD: 25.4 % (ref 18–48)
MCH RBC QN AUTO: 26.6 PG (ref 27–31)
MCHC RBC AUTO-ENTMCNC: 30.4 G/DL (ref 32–36)
MCV RBC AUTO: 88 FL (ref 82–98)
MONOCYTES # BLD AUTO: 1 K/UL (ref 0.3–1)
MONOCYTES NFR BLD: 10.2 % (ref 4–15)
NEUTROPHILS # BLD AUTO: 5.9 K/UL (ref 1.8–7.7)
NEUTROPHILS NFR BLD: 59.1 % (ref 38–73)
NRBC BLD-RTO: 0 /100 WBC
PLATELET # BLD AUTO: 485 K/UL (ref 150–350)
PMV BLD AUTO: 10.6 FL (ref 9.2–12.9)
POTASSIUM SERPL-SCNC: 4.1 MMOL/L (ref 3.5–5.1)
PROT SERPL-MCNC: 8.6 G/DL (ref 6–8.4)
RBC # BLD AUTO: 4.4 M/UL (ref 4.6–6.2)
SODIUM SERPL-SCNC: 134 MMOL/L (ref 136–145)
WBC # BLD AUTO: 9.92 K/UL (ref 3.9–12.7)

## 2020-10-29 PROCEDURE — 85025 COMPLETE CBC W/AUTO DIFF WBC: CPT

## 2020-10-29 PROCEDURE — 25000003 PHARM REV CODE 250: Performed by: STUDENT IN AN ORGANIZED HEALTH CARE EDUCATION/TRAINING PROGRAM

## 2020-10-29 PROCEDURE — 99232 SBSQ HOSP IP/OBS MODERATE 35: CPT | Mod: ,,, | Performed by: INTERNAL MEDICINE

## 2020-10-29 PROCEDURE — 99232 PR SUBSEQUENT HOSPITAL CARE,LEVL II: ICD-10-PCS | Mod: ,,, | Performed by: INTERNAL MEDICINE

## 2020-10-29 PROCEDURE — 80053 COMPREHEN METABOLIC PANEL: CPT

## 2020-10-29 PROCEDURE — 11000001 HC ACUTE MED/SURG PRIVATE ROOM

## 2020-10-29 PROCEDURE — 36415 COLL VENOUS BLD VENIPUNCTURE: CPT

## 2020-10-29 RX ADMIN — FERROUS SULFATE TAB EC 325 MG (65 MG FE EQUIVALENT) 325 MG: 325 (65 FE) TABLET DELAYED RESPONSE at 10:10

## 2020-10-29 RX ADMIN — FOLIC ACID-PYRIDOXINE-CYANOCOBALAMIN TAB 2.5-25-2 MG 1 TABLET: 2.5-25-2 TAB at 10:10

## 2020-10-29 NOTE — SUBJECTIVE & OBJECTIVE
Interval History: NAEON. Patient reports coughing up minimal blood tinge sputum x1 2/2 to bronchoscopy that has since resolved. Patient reports resolution of chest pain associated with subcutaneous emphysema. Patient denies any constitutional, cardiac, worsening respiratory, GI or  complaints.     Review of Systems   Constitutional: Negative for chills, fatigue and fever.   HENT: Negative for hearing loss, rhinorrhea and sinus pain.    Eyes: Negative for photophobia and visual disturbance.   Respiratory: Negative for cough, choking, chest tightness, shortness of breath and wheezing.         Resolving tenderness associated with chest tube, subcutaneous emphysema and sutures   Cardiovascular: Negative for chest pain and palpitations.   Gastrointestinal: Negative for abdominal distention, abdominal pain, diarrhea and nausea.   Genitourinary: Negative for difficulty urinating, dysuria, flank pain and hematuria.   Musculoskeletal: Negative for back pain, joint swelling and myalgias.   Skin: Negative for color change and pallor.   Neurological: Negative for dizziness, facial asymmetry, weakness and headaches.   Psychiatric/Behavioral: Negative for agitation and confusion.     Objective:     Vital Signs (Most Recent):  Temp: 97.9 °F (36.6 °C) (10/29/20 1122)  Pulse: (!) 111 (10/29/20 1122)  Resp: 16 (10/29/20 1122)  BP: 131/78 (10/29/20 1122)  SpO2: (!) 92 % (10/29/20 1122) Vital Signs (24h Range):  Temp:  [96.9 °F (36.1 °C)-98.7 °F (37.1 °C)] 97.9 °F (36.6 °C)  Pulse:  [] 111  Resp:  [12-21] 16  SpO2:  [91 %-100 %] 92 %  BP: (115-145)/(62-87) 131/78     Weight: (!) 198.2 kg (437 lb)  Body mass index is 56.11 kg/m².    Intake/Output Summary (Last 24 hours) at 10/29/2020 1414  Last data filed at 10/29/2020 0600  Gross per 24 hour   Intake 1200 ml   Output 1940 ml   Net -740 ml      Physical Exam  Vitals signs and nursing note reviewed.   Constitutional:       General: He is not in acute distress.     Appearance:  Normal appearance. He is obese. He is not ill-appearing.   HENT:      Head: Normocephalic and atraumatic.      Right Ear: External ear normal.      Left Ear: External ear normal.      Nose: Nose normal.      Mouth/Throat:      Pharynx: Oropharynx is clear.   Eyes:      Extraocular Movements: Extraocular movements intact.      Conjunctiva/sclera: Conjunctivae normal.      Pupils: Pupils are equal, round, and reactive to light.   Neck:      Musculoskeletal: Normal range of motion. No neck rigidity or muscular tenderness.   Cardiovascular:      Rate and Rhythm: Normal rate and regular rhythm.      Heart sounds: No murmur. No gallop.    Pulmonary:      Effort: Pulmonary effort is normal. No respiratory distress.      Breath sounds: No wheezing or rhonchi.      Comments: Two test tubes placed left upper chest. 1 pig tail tube that is clamped and 1 thoracostomy tube to suction. Former draining yellow clear fluid other draining serosanguinous fluid  Abdominal:      General: There is no distension.      Palpations: Abdomen is soft.      Tenderness: There is no abdominal tenderness. There is no guarding.   Musculoskeletal:         General: No swelling, tenderness or deformity.      Right lower leg: No edema.      Left lower leg: No edema.   Skin:     General: Skin is warm and dry.      Coloration: Skin is not jaundiced.   Neurological:      General: No focal deficit present.      Mental Status: He is alert and oriented to person, place, and time. Mental status is at baseline.      Motor: No weakness.   Psychiatric:         Mood and Affect: Mood normal.         Behavior: Behavior normal.         Significant Labs:   Blood Culture: No results for input(s): LABBLOO in the last 48 hours.  CBC:   Recent Labs   Lab 10/29/20  1105   WBC 9.92   HGB 11.7*   HCT 38.5*   *     CMP: No results for input(s): NA, K, CL, CO2, GLU, BUN, CREATININE, CALCIUM, PROT, ALBUMIN, BILITOT, ALKPHOS, AST, ALT, ANIONGAP, EGFRNONAA in the last 48  hours.    Invalid input(s): LAZ    Significant Imaging: I have reviewed all pertinent imaging results/findings within the past 24 hours.

## 2020-10-29 NOTE — ASSESSMENT & PLAN NOTE
Patient is a 31 yo M with obesity who presented as a transfer for complaints of SOB and fatigue and was found to have recurrent tension pneumothorax with chest tube placement. Currently with 2 (1 pigtail and 1 thoracostomy tube) chest tubes after initial one failed. Initially tension pneumothorax presumably due to his infectious cavitary lesion. Here for second opinion. Patient develops symptoms after clamped. On 10/20, CT showed large left pneumothroax with mediastinal shift to right and large volume subcutaneous emphysema in left chest wall. Subsequent CXR showed adequate expansion after Chest tube adjusment. Repeat CXR here at [facility] show's small apical left sided pneumothorax due to his cavitary lesion. Review of previous CT from [facility] and CT done this admission show no signs of cavitary lesion. Daily CXR show unchanged pneumothorax and persistent subcutaneous emphysema. Patient continues to have serosanguinous drainage without any acute changes and denies any new SOB or respiratory complaints. Patient has continued to not require oxygen and has had minimal pain requirements. Plan is for discharge with follow up with PCP.    Plan  - bronchoscopy with endobronchial valve placement POD 1  - Pigtail catheter clamped, chest tube placed to suction, drains on side of bed  - Consult thoracic surgery for recommendations with possible removal of chest tubes tomorrow  - Pain regimen added   - Supplemental O2 as needed  - PRN albuterol for wheezing

## 2020-10-29 NOTE — SUBJECTIVE & OBJECTIVE
Interval History: Did well overnight  AFVSS on RA  Small air leak on cough this morning     Medications:  Continuous Infusions:  Scheduled Meds:   ferrous sulfate  325 mg Oral Daily    folic acid-vit B6-vit B12 2.5-25-2 mg  1 tablet Oral Daily    senna  8.6 mg Oral Daily     PRN Meds:acetaminophen, albuterol, dextrose 50%, dextrose 50%, erythromycin, glucagon (human recombinant), glucose, glucose, morphine, sodium chloride 0.9%, sodium chloride 0.9%     Review of patient's allergies indicates:   Allergen Reactions    Bactrim [sulfamethoxazole-trimethoprim]      G6PD     Shellfish containing products      Objective:     Vital Signs (Most Recent):  Temp: 96.9 °F (36.1 °C) (10/29/20 0455)  Pulse: 98 (10/29/20 0509)  Resp: 12 (10/29/20 0455)  BP: 115/62 (10/29/20 0455)  SpO2: 96 % (10/29/20 0455) Vital Signs (24h Range):  Temp:  [96.9 °F (36.1 °C)-98.8 °F (37.1 °C)] 96.9 °F (36.1 °C)  Pulse:  [] 98  Resp:  [12-21] 12  SpO2:  [91 %-99 %] 96 %  BP: (115-145)/(62-87) 115/62     Intake/Output - Last 3 Shifts       10/27 0700 - 10/28 0659 10/28 0700 - 10/29 0659 10/29 0700 - 10/30 0659    P.O.  200     I.V. (mL/kg)  1000 (5)     Total Intake(mL/kg)  1200 (6.1)     Urine (mL/kg/hr) 875 (0.2) 1100 (0.2)     Chest Tube 30 80     Total Output 905 1180     Net -905 +20            Urine Occurrence  3 x           SpO2: 96 %  O2 Device (Oxygen Therapy): room air    Physical Exam  Vitals signs reviewed.   Constitutional:       General: He is not in acute distress.     Appearance: Normal appearance. He is obese. He is not ill-appearing.   HENT:      Head: Normocephalic and atraumatic.      Right Ear: External ear normal.      Left Ear: External ear normal.      Nose: Nose normal.      Mouth/Throat:      Pharynx: Oropharynx is clear.   Eyes:      Extraocular Movements: Extraocular movements intact.      Conjunctiva/sclera: Conjunctivae normal.      Pupils: Pupils are equal, round, and reactive to light.   Neck:       Musculoskeletal: Normal range of motion. No neck rigidity or muscular tenderness.   Cardiovascular:      Rate and Rhythm: Normal rate and regular rhythm.      Heart sounds: No murmur. No gallop.    Pulmonary:      Effort: Pulmonary effort is normal. No respiratory distress.      Breath sounds: No wheezing or rhonchi.      Comments: Two test tubes placed left upper chest  Upper chest tube with serosanguinous output  Lower pigtail catheter with thin serous output, + air leak  Abdominal:      General: There is no distension.      Palpations: Abdomen is soft.      Tenderness: There is no abdominal tenderness. There is no guarding.   Musculoskeletal:         General: No swelling, tenderness or deformity.      Right lower leg: No edema.      Left lower leg: No edema.   Skin:     General: Skin is warm and dry.      Coloration: Skin is not jaundiced.   Neurological:      General: No focal deficit present.      Mental Status: He is alert and oriented to person, place, and time. Mental status is at baseline.      Motor: No weakness.         Significant Labs:  ABGs: No results for input(s): PH, PCO2, PO2, HCO3, POCSATURATED, BE in the last 48 hours.  Amylase: No results for input(s): AMYLASE in the last 48 hours.  BMP: No results for input(s): GLU, NA, K, CL, CO2, BUN, CREATININE, CALCIUM, MG in the last 48 hours.  Cardiac markers: No results for input(s): CKMB, CPKMB, TROPONINT, TROPONINI, MYOGLOBIN in the last 48 hours.  CBC: No results for input(s): WBC, RBC, HGB, HCT, PLT, MCV, MCH, MCHC in the last 48 hours.  CMP: No results for input(s): GLU, CALCIUM, ALBUMIN, PROT, NA, K, CO2, CL, BUN, CREATININE, ALKPHOS, ALT, AST, BILITOT in the last 48 hours.    Significant Diagnostics:  I have reviewed all pertinent imaging results/findings within the past 24 hours.    VTE Risk Mitigation (From admission, onward)         Ordered     IP VTE LOW RISK PATIENT  Once      10/23/20 2321     Place sequential compression device  Until  discontinued      10/23/20 5708

## 2020-10-29 NOTE — OP NOTE
Date of Procedure: 10/29/2020     Pre-operative Diagnosis: Spontaneous Left Pneumothorax with Persistent Air Leak      Post-operative Diagnosis: Same     Procedure(s): Flexible Bronchoscopy with Insertion of Bronchial Valve     Surgeon: Dmitriy Benitez MD     Assistant(s): Edward Wooten MD     Anesthesia: GETA     Findings: Decrease in caliber of air leak with balloon occlusion and subsequent bronchial valve placement in basilar segmental bronchus     Estimated Blood Loss: Minimal     Specimen(s): None     Complications: None     Indications for Procedure: 29 yo male with reported history of cavitary lesion and spontaneous left pneumothorax.  The air leak has persisted and it was decided to attempt bronchial valve placement.  Risks, benefits and possible outcomes were discussed in detail with the patient, and he and his family were given the opportunity to ask questions and have those questions answered to their satisfaction.  he desires to proceed and signed consent.     Procedure in Detail: The patient was taken to the operating room and placed supine on the operating table.  Adequate general anesthesia was achieved.  Time-out was performed.  Flexible bronchoscope was passed through the endotracheal tube and the entire airway examined to the segmental level.  There were no endobronchial lesions or abnormal secretions.  The airways were sequentially occluded with a Maximiliano catheter.  There was a significant decrease in the caliber of the air leak when the lower bronchus was occluded.  A 7mm Spiration valve was then deployed in the superior segmental bronchus.  Air leak persisted.  A 9mm Spiration valve was then placed in the basilar segmental bronchus.  Air leak decreased significantly in caliber.  Superior segmental bronchial valve was then removed fully intact with biopsy forceps.  Hemostasis was excellent.  Scope was removed.  The patient tolerated the procedure well.  There were no immediate complications.  He  was extubated in the operating room.     Disposition: PACU in stable condition

## 2020-10-29 NOTE — ASSESSMENT & PLAN NOTE
Sputum cultures at [facility] MRSA+, + 1/3 AFB cultures, TB/MAC PCR negative.   9/21 at [facility], CT chest demonstrated a 5.7 x 4.4 cm left lower lobe cavitary lesion. Was initially treated at [facility] with augmentin after growing gram negative rods on cultures, however was also growing MRSA. Today he was started on PO clindamycin at [facility] today. On 10/20 CT surgery was consulted for wedge vs lobectomy, but no surgical intervention was done due to no significant blebs and likely infectious etiology. Records from [facility] showed no sign of cavitary lesion and  plans to proceed with bronchoscopy and endobronchial valve placement in AM on 10/28. Patient continues to be stable and afebrile while off antibiotics and will likely not require abx this admission. Patient will follow up with ID in 2 weeks.     Plan  - Follow up with ID outpatient in 2 weeks   - Chest tube drainage fluid cultures, blood cultures and respiratory cultures will be of limited value at this point since patient has been treated with antibiotics for past month; however, will redraw drainage fluid cultures for now.   - [facility] did draw tube drainage bacterial, fungal and AFB cultures  - Will monitor patient off abx

## 2020-10-29 NOTE — ASSESSMENT & PLAN NOTE
29yo morbidly obese male without any other significant medical history who presented to [facility] from OSH for evaluation given recurrent left sided pneumothoraces. Thoracic surgery consulted for evaluation and potential surgical intervention      - S/p bronch, endobronchial valve placement in basal segmental bronchus of LLL  - Clamp pigtail, large chest tube to waterseal  - Repeat CXR at 10AM, plan to clamp chest tube if lung remains inflated  - Daily CXR

## 2020-10-29 NOTE — PROGRESS NOTES
[facility]  Thoracic Surgery  Progress Note    Subjective:     History of Present Illness:  Patient is a 31yo morbidly obese male without any other significant medical history who presented to [facility] as transfer from [facility] for evaluation given recurrent left sided pneumothoraces. Patient reports prior to mid-September he was in his usual state of health. Around 9/20 he developed flu-like symptoms with fatigue, congestion and some shortness of breath. He later experienced appreciable left sided chest pain, reportedly worse with inspiration and presented to the ED where he was found to have a LLL cavitary lesion.  An AFB smear was positive for TB and MRSA, but TB/MAC PCR was negative. He was discharged with augmentin for 30 days because also growing gram negative rods. He was progressing at home until 10/16 when sharp left sided chest pain and shortness of breath prompted return to the ED at the OSH. Here, he was found to have a PTX which was thought to be 2/2 to his cavitary lesion. A chest tube was placed at that time with resolution of his symptoms; however, throughout his stay he has three recurrences of pneumothoraces requiring additional chest tubes for a total of 3 and return to suction. He reports large upper chest tube was placed most recently prior to his transfer here for second opinion. He reports his symptoms typically resolve with tube placement but recur whenever taken off suction. He denies any travel history or sick contacts that he knows of. He denies fevers, chills, n/v/d or cough. Non-smoker.     Post-Op Info:  Procedure(s) (LRB):  BRONCHOSCOPY, FLEXIBLE, WITH ENDOBRONCHIAL VALVE INSERTION (N/A)   1 Day Post-Op     Interval History: Did well overnight  AFVSS on RA  Small air leak on cough this morning     Medications:  Continuous Infusions:  Scheduled Meds:   ferrous sulfate  325 mg Oral Daily    folic acid-vit B6-vit B12 2.5-25-2 mg  1 tablet Oral Daily    senna  8.6 mg Oral  Daily     PRN Meds:acetaminophen, albuterol, dextrose 50%, dextrose 50%, erythromycin, glucagon (human recombinant), glucose, glucose, morphine, sodium chloride 0.9%, sodium chloride 0.9%     Review of patient's allergies indicates:   Allergen Reactions    Bactrim [sulfamethoxazole-trimethoprim]      G6PD     Shellfish containing products      Objective:     Vital Signs (Most Recent):  Temp: 96.9 °F (36.1 °C) (10/29/20 0455)  Pulse: 98 (10/29/20 0509)  Resp: 12 (10/29/20 0455)  BP: 115/62 (10/29/20 0455)  SpO2: 96 % (10/29/20 0455) Vital Signs (24h Range):  Temp:  [96.9 °F (36.1 °C)-98.8 °F (37.1 °C)] 96.9 °F (36.1 °C)  Pulse:  [] 98  Resp:  [12-21] 12  SpO2:  [91 %-99 %] 96 %  BP: (115-145)/(62-87) 115/62     Intake/Output - Last 3 Shifts       10/27 0700 - 10/28 0659 10/28 0700 - 10/29 0659 10/29 0700 - 10/30 0659    P.O.  200     I.V. (mL/kg)  1000 (5)     Total Intake(mL/kg)  1200 (6.1)     Urine (mL/kg/hr) 875 (0.2) 1100 (0.2)     Chest Tube 30 80     Total Output 905 1180     Net -905 +20            Urine Occurrence  3 x           SpO2: 96 %  O2 Device (Oxygen Therapy): room air    Physical Exam  Vitals signs reviewed.   Constitutional:       General: He is not in acute distress.     Appearance: Normal appearance. He is obese. He is not ill-appearing.   HENT:      Head: Normocephalic and atraumatic.      Right Ear: External ear normal.      Left Ear: External ear normal.      Nose: Nose normal.      Mouth/Throat:      Pharynx: Oropharynx is clear.   Eyes:      Extraocular Movements: Extraocular movements intact.      Conjunctiva/sclera: Conjunctivae normal.      Pupils: Pupils are equal, round, and reactive to light.   Neck:      Musculoskeletal: Normal range of motion. No neck rigidity or muscular tenderness.   Cardiovascular:      Rate and Rhythm: Normal rate and regular rhythm.      Heart sounds: No murmur. No gallop.    Pulmonary:      Effort: Pulmonary effort is normal. No respiratory distress.       Breath sounds: No wheezing or rhonchi.      Comments: Two test tubes placed left upper chest  Upper chest tube with serosanguinous output  Lower pigtail catheter with thin serous output, + air leak  Abdominal:      General: There is no distension.      Palpations: Abdomen is soft.      Tenderness: There is no abdominal tenderness. There is no guarding.   Musculoskeletal:         General: No swelling, tenderness or deformity.      Right lower leg: No edema.      Left lower leg: No edema.   Skin:     General: Skin is warm and dry.      Coloration: Skin is not jaundiced.   Neurological:      General: No focal deficit present.      Mental Status: He is alert and oriented to person, place, and time. Mental status is at baseline.      Motor: No weakness.         Significant Labs:  ABGs: No results for input(s): PH, PCO2, PO2, HCO3, POCSATURATED, BE in the last 48 hours.  Amylase: No results for input(s): AMYLASE in the last 48 hours.  BMP: No results for input(s): GLU, NA, K, CL, CO2, BUN, CREATININE, CALCIUM, MG in the last 48 hours.  Cardiac markers: No results for input(s): CKMB, CPKMB, TROPONINT, TROPONINI, MYOGLOBIN in the last 48 hours.  CBC: No results for input(s): WBC, RBC, HGB, HCT, PLT, MCV, MCH, MCHC in the last 48 hours.  CMP: No results for input(s): GLU, CALCIUM, ALBUMIN, PROT, NA, K, CO2, CL, BUN, CREATININE, ALKPHOS, ALT, AST, BILITOT in the last 48 hours.    Significant Diagnostics:  I have reviewed all pertinent imaging results/findings within the past 24 hours.    VTE Risk Mitigation (From admission, onward)         Ordered     IP VTE LOW RISK PATIENT  Once      10/23/20 2321     Place sequential compression device  Until discontinued      10/23/20 2321              Assessment/Plan:     * Spontaneous tension pneumothorax  31yo morbidly obese male without any other significant medical history who presented to [facility] [place] OSH for evaluation given recurrent left sided pneumothoraces. Thoracic  surgery consulted for evaluation and potential surgical intervention      - S/p bronch, endobronchial valve placement in basal segmental bronchus of LLL  - Clamp pigtail, large chest tube to waterseal  - Repeat CXR at 10AM, plan to clamp chest tube if lung remains inflated  - Daily CXR        Edward Wooten MD  Thoracic Surgery  [facility]

## 2020-10-29 NOTE — ASSESSMENT & PLAN NOTE
Sputum cultures at [facility] MRSA+, + 1/3 AFB cultures, TB/MAC PCR negative.   9/21 at [facility], CT chest demonstrated a 5.7 x 4.4 cm left lower lobe cavitary lesion. Was initially treated at [facility] with augmentin after growing gram negative rods on cultures, however was also growing MRSA. Today he was started on PO clindamycin at [facility] today. On 10/20 CT surgery was consulted for wedge vs lobectomy, but no surgical intervention was done due to no significant blebs and likely infectious etiology. Records from [facility] showed no sign of cavitary lesion and  plans to proceed with bronchoscopy and endobronchial valve placement in AM on 10/28. Patient continues to be stable and afebrile while off antibiotics and will likely not require abx this admission. Patient will follow up with ID in 2 weeks.     Plan  - Follow up with ID outpatient in 2 weeks   - [facility] did draw tube drainage bacterial, fungal and AFB cultures  - Will monitor patient off abx

## 2020-10-29 NOTE — PROGRESS NOTES
[facility] Hospital Medicine  Progress Note    Patient Name: Reggie An  MRN: [id number]  Patient Class: IP- Inpatient   Admission Date: 10/23/2020  Length of Stay: 6 days  Attending Physician: Elizabeth Herman MD  Primary Care Provider: Primary Doctor [facility] Medicine Team: [facility] HOSP MED 2 Ritesh Novoa MD    Subjective:     Principal Problem:Spontaneous tension pneumothorax        HPI:  Mr. An is a 30 y.o. M with morbid obesity presenting of left sided chest pain and SOB since 10/16. He was transferred here from [facility] for a second opinion. Around the week prior to 9/20, patient reported flu like symptoms such as fatigue, some nasal congestion and SOB. He said he started to feel better and his cold symptoms were improving but Around 9/20 he developed significant left sided chest pain, worse with inspiration. He went to the ER and was found to have a LLL cavitary lesion. An AFB smear was positive for TB and MRSA, but TB/MAC PCR was negative. He was discharged with augmentin for 30 days because also growing gram negative rods. About another week goes by and his left sided chest pain and SOB comes back. He goes back to [facility] ER on 10/16 and was found to have a pneumothroax likely secondary to his cavitary lesion. A chest tube was placed with improvement of symptoms. However, during his stay he had intermittent episodes of chest pain and sob and repeat imaging showed recurring pneumothorax. Another chest tube was placed and he was transferred to [facility] for a second opinion regarding recurrent pneumothorax. He states that when ever his chest tube is clamped he develops symptoms. He denies any travel history or sick contacts that he knows of. He denies fevers, chills, n/v/d or cough.            Overview/Hospital Course:  Patient was admitted for evaluation of possible cavitary lesion and is being followed by general surgery and infectious disease for recommendations. Records from [facility]  were obtained which included previous CT which showed no sign of cavitary lesion. Patient taken to be taken to OR on 10/28 for bronchoscopy and endobronchial valve placement. Patient was seen by thoracic surgery on 10/29 who clamped the pigtail catheter and repeated the CXR with plans to clamp the larger bore catheter and remove both chest tubes in AM. Records from [facility] show 3x negative TB PCR. Infectious disease recommends outpatient follow up in 2 weeks with no recommended antibiotic therapy at this time.    Interval History: NAEON. Patient reports coughing up minimal blood tinge sputum x1 2/2 to bronchoscopy that has since resolved. Patient reports resolution of chest pain associated with subcutaneous emphysema. Patient denies any constitutional, cardiac, worsening respiratory, GI or  complaints.     Review of Systems   Constitutional: Negative for chills, fatigue and fever.   HENT: Negative for hearing loss, rhinorrhea and sinus pain.    Eyes: Negative for photophobia and visual disturbance.   Respiratory: Negative for cough, choking, chest tightness, shortness of breath and wheezing.         Resolving tenderness associated with chest tube, subcutaneous emphysema and sutures   Cardiovascular: Negative for chest pain and palpitations.   Gastrointestinal: Negative for abdominal distention, abdominal pain, diarrhea and nausea.   Genitourinary: Negative for difficulty urinating, dysuria, flank pain and hematuria.   Musculoskeletal: Negative for back pain, joint swelling and myalgias.   Skin: Negative for color change and pallor.   Neurological: Negative for dizziness, facial asymmetry, weakness and headaches.   Psychiatric/Behavioral: Negative for agitation and confusion.     Objective:     Vital Signs (Most Recent):  Temp: 97.9 °F (36.6 °C) (10/29/20 1122)  Pulse: (!) 111 (10/29/20 1122)  Resp: 16 (10/29/20 1122)  BP: 131/78 (10/29/20 1122)  SpO2: (!) 92 % (10/29/20 1122) Vital Signs (24h Range):  Temp:  [96.9  °F (36.1 °C)-98.7 °F (37.1 °C)] 97.9 °F (36.6 °C)  Pulse:  [] 111  Resp:  [12-21] 16  SpO2:  [91 %-100 %] 92 %  BP: (115-145)/(62-87) 131/78     Weight: (!) 198.2 kg (437 lb)  Body mass index is 56.11 kg/m².    Intake/Output Summary (Last 24 hours) at 10/29/2020 1414  Last data filed at 10/29/2020 0600  Gross per 24 hour   Intake 1200 ml   Output 1940 ml   Net -740 ml      Physical Exam  Vitals signs and nursing note reviewed.   Constitutional:       General: He is not in acute distress.     Appearance: Normal appearance. He is obese. He is not ill-appearing.   HENT:      Head: Normocephalic and atraumatic.      Right Ear: External ear normal.      Left Ear: External ear normal.      Nose: Nose normal.      Mouth/Throat:      Pharynx: Oropharynx is clear.   Eyes:      Extraocular Movements: Extraocular movements intact.      Conjunctiva/sclera: Conjunctivae normal.      Pupils: Pupils are equal, round, and reactive to light.   Neck:      Musculoskeletal: Normal range of motion. No neck rigidity or muscular tenderness.   Cardiovascular:      Rate and Rhythm: Normal rate and regular rhythm.      Heart sounds: No murmur. No gallop.    Pulmonary:      Effort: Pulmonary effort is normal. No respiratory distress.      Breath sounds: No wheezing or rhonchi.      Comments: Two test tubes placed left upper chest. 1 pig tail tube that is clamped and 1 thoracostomy tube to suction. Former draining yellow clear fluid other draining serosanguinous fluid  Abdominal:      General: There is no distension.      Palpations: Abdomen is soft.      Tenderness: There is no abdominal tenderness. There is no guarding.   Musculoskeletal:         General: No swelling, tenderness or deformity.      Right lower leg: No edema.      Left lower leg: No edema.   Skin:     General: Skin is warm and dry.      Coloration: Skin is not jaundiced.   Neurological:      General: No focal deficit present.      Mental Status: He is alert and oriented  to person, place, and time. Mental status is at baseline.      Motor: No weakness.   Psychiatric:         Mood and Affect: Mood normal.         Behavior: Behavior normal.         Significant Labs:   Blood Culture: No results for input(s): LABBLOO in the last 48 hours.  CBC:   Recent Labs   Lab 10/29/20  1105   WBC 9.92   HGB 11.7*   HCT 38.5*   *     CMP: No results for input(s): NA, K, CL, CO2, GLU, BUN, CREATININE, CALCIUM, PROT, ALBUMIN, BILITOT, ALKPHOS, AST, ALT, ANIONGAP, EGFRNONAA in the last 48 hours.    Invalid input(s): ESTGFAFRICA    Significant Imaging: I have reviewed all pertinent imaging results/findings within the past 24 hours.      Assessment/Plan:      * Spontaneous tension pneumothorax  Patient is a 31 yo M with obesity who presented as a transfer for complaints of SOB and fatigue and was found to have recurrent tension pneumothorax with chest tube placement. Currently with 2 (1 pigtail and 1 thoracostomy tube) chest tubes after initial one failed. Initially tension pneumothorax presumably due to his infectious cavitary lesion. Here for second opinion. Patient develops symptoms after clamped. On 10/20, CT showed large left pneumothroax with mediastinal shift to right and large volume subcutaneous emphysema in left chest wall. Subsequent CXR showed adequate expansion after Chest tube adjusment. Repeat CXR here at [facility] show's small apical left sided pneumothorax due to his cavitary lesion. Review of previous CT from [facility] and CT done this admission show no signs of cavitary lesion. Daily CXR show unchanged pneumothorax and persistent subcutaneous emphysema. Patient continues to have serosanguinous drainage without any acute changes and denies any new SOB or respiratory complaints. Patient has continued to not require oxygen and has had minimal pain requirements. Plan is for discharge with follow up with PCP.    Plan  - bronchoscopy with endobronchial valve placement POD 1  - Pigtail catheter  clamped, chest tube placed to suction, drains on side of bed  - Consult thoracic surgery for recommendations with possible removal of chest tubes tomorrow  - Pain regimen added   - Supplemental O2 as needed  - PRN albuterol for wheezing          Cavitary lesion of lung  Sputum cultures at [facility] MRSA+, + 1/3 AFB cultures, TB/MAC PCR negative.   9/21 at [facility], CT chest demonstrated a 5.7 x 4.4 cm left lower lobe cavitary lesion. Was initially treated at [facility] with augmentin after growing gram negative rods on cultures, however was also growing MRSA. Today he was started on PO clindamycin at [facility] today. On 10/20 CT surgery was consulted for wedge vs lobectomy, but no surgical intervention was done due to no significant blebs and likely infectious etiology. Records from [facility] showed no sign of cavitary lesion and  plans to proceed with bronchoscopy and endobronchial valve placement in AM on 10/28. Patient continues to be stable and afebrile while off antibiotics and will likely not require abx this admission. Patient will follow up with ID in 2 weeks.     Plan  - Follow up with ID outpatient in 2 weeks   - Chest tube drainage fluid cultures, blood cultures and respiratory cultures will be of limited value at this point since patient has been treated with antibiotics for past month; however, will redraw drainage fluid cultures for now.   - [facility] did draw tube drainage bacterial, fungal and AFB cultures  - Will monitor patient off abx      Secondary spontaneous pneumothorax  See spontaneous pneumothorax      G6PD deficiency  Patient found to have G6PD level of 2.4 with normal value of 7-25. Patient was tested based on possible treatment with oxidizing agent for TB or cavitary lesions. Allergies to Sulfa placed in chart.    Plan  - Avoid oxidizing agents and monitor for signs of worsening anemia    Folate deficiency anemia  - continue PO Folate outpatient       Iron deficiency anemia  - continue PO Iron outpatient        Morbid obesity with body mass index of 50.0-59.9 in adult  - diabetic diet        VTE Risk Mitigation (From admission, onward)         Ordered     IP VTE LOW RISK PATIENT  Once      10/23/20 2321     Place sequential compression device  Until discontinued      10/23/20 2321                Discharge Planning   RAMESH: 10/30/2020     Code Status: Full Code   Is the patient medically ready for discharge?:     Reason for patient still in hospital (select all that apply): Consult recommendations and Pending disposition  Discharge Plan A: Home                  Ritesh Novoa MD  Department of Hospital Medicine   [facility]

## 2020-10-30 PROCEDURE — 99232 SBSQ HOSP IP/OBS MODERATE 35: CPT | Mod: ,,, | Performed by: INTERNAL MEDICINE

## 2020-10-30 PROCEDURE — 99232 PR SUBSEQUENT HOSPITAL CARE,LEVL II: ICD-10-PCS | Mod: ,,, | Performed by: INTERNAL MEDICINE

## 2020-10-30 PROCEDURE — 11000001 HC ACUTE MED/SURG PRIVATE ROOM

## 2020-10-30 PROCEDURE — 25000003 PHARM REV CODE 250: Performed by: STUDENT IN AN ORGANIZED HEALTH CARE EDUCATION/TRAINING PROGRAM

## 2020-10-30 RX ORDER — OXYCODONE HYDROCHLORIDE 5 MG/1
5 TABLET ORAL EVERY 4 HOURS PRN
Qty: 7 TABLET | Refills: 0 | Status: SHIPPED | OUTPATIENT
Start: 2020-10-30 | End: 2020-10-30 | Stop reason: SDUPTHER

## 2020-10-30 RX ORDER — OXYCODONE HYDROCHLORIDE 5 MG/1
5 TABLET ORAL EVERY 4 HOURS PRN
Qty: 7 TABLET | Refills: 0 | Status: SHIPPED | OUTPATIENT
Start: 2020-10-30 | End: 2020-11-12

## 2020-10-30 RX ORDER — FERROUS SULFATE 325(65) MG
325 TABLET, DELAYED RELEASE (ENTERIC COATED) ORAL DAILY
Qty: 90 TABLET | Refills: 0 | Status: SHIPPED | OUTPATIENT
Start: 2020-10-31 | End: 2020-11-12 | Stop reason: SDUPTHER

## 2020-10-30 RX ADMIN — FOLIC ACID-PYRIDOXINE-CYANOCOBALAMIN TAB 2.5-25-2 MG 1 TABLET: 2.5-25-2 TAB at 08:10

## 2020-10-30 RX ADMIN — ACETAMINOPHEN 650 MG: 325 TABLET ORAL at 12:10

## 2020-10-30 RX ADMIN — FERROUS SULFATE TAB EC 325 MG (65 MG FE EQUIVALENT) 325 MG: 325 (65 FE) TABLET DELAYED RESPONSE at 08:10

## 2020-10-30 NOTE — SUBJECTIVE & OBJECTIVE
Interval History: NAEON. Patient seen this AM with no complaints currently and has no worsening respiratory symptoms since removal of pigtail catheter.     Review of Systems   Constitutional: Negative for chills, fatigue and fever.   HENT: Negative for hearing loss, rhinorrhea and sinus pain.    Eyes: Negative for photophobia and visual disturbance.   Respiratory: Negative for cough, choking, chest tightness, shortness of breath and wheezing.         Resolving tenderness associated with chest tube, subcutaneous emphysema and sutures   Cardiovascular: Negative for chest pain and palpitations.   Gastrointestinal: Negative for abdominal distention, abdominal pain, diarrhea and nausea.   Genitourinary: Negative for difficulty urinating, dysuria, flank pain and hematuria.   Musculoskeletal: Negative for back pain, joint swelling and myalgias.   Skin: Negative for color change and pallor.   Neurological: Negative for dizziness, facial asymmetry, weakness and headaches.   Psychiatric/Behavioral: Negative for agitation and confusion.     Objective:     Vital Signs (Most Recent):  Temp: 96.1 °F (35.6 °C) (10/30/20 1528)  Pulse: 107 (10/30/20 1528)  Resp: 16 (10/30/20 1528)  BP: 136/83 (10/30/20 1528)  SpO2: (!) 94 % (10/30/20 1528) Vital Signs (24h Range):  Temp:  [96.1 °F (35.6 °C)-98.5 °F (36.9 °C)] 96.1 °F (35.6 °C)  Pulse:  [] 107  Resp:  [16-20] 16  SpO2:  [91 %-97 %] 94 %  BP: (101-136)/(52-83) 136/83     Weight: (!) 198.2 kg (437 lb)  Body mass index is 56.11 kg/m².  No intake or output data in the 24 hours ending 10/30/20 1618   Physical Exam  Vitals signs and nursing note reviewed.   Constitutional:       General: He is not in acute distress.     Appearance: Normal appearance. He is obese. He is not ill-appearing.   HENT:      Head: Normocephalic and atraumatic.      Right Ear: External ear normal.      Left Ear: External ear normal.      Nose: Nose normal.      Mouth/Throat:      Pharynx: Oropharynx is clear.    Eyes:      Extraocular Movements: Extraocular movements intact.      Conjunctiva/sclera: Conjunctivae normal.      Pupils: Pupils are equal, round, and reactive to light.   Neck:      Musculoskeletal: Normal range of motion. No neck rigidity or muscular tenderness.   Cardiovascular:      Rate and Rhythm: Normal rate and regular rhythm.      Heart sounds: No murmur. No gallop.    Pulmonary:      Effort: Pulmonary effort is normal. No respiratory distress.      Breath sounds: No wheezing or rhonchi.      Comments: chest tubes placed left upper chest thoracostomy tube to suction draining serosanguinous fluid that is now clamped  Abdominal:      General: There is no distension.      Palpations: Abdomen is soft.      Tenderness: There is no abdominal tenderness. There is no guarding.   Musculoskeletal:         General: No swelling, tenderness or deformity.      Right lower leg: No edema.      Left lower leg: No edema.   Skin:     General: Skin is warm and dry.      Coloration: Skin is not jaundiced.   Neurological:      General: No focal deficit present.      Mental Status: He is alert and oriented to person, place, and time. Mental status is at baseline.      Motor: No weakness.   Psychiatric:         Mood and Affect: Mood normal.         Behavior: Behavior normal.         Significant Labs:   CBC:   Recent Labs   Lab 10/29/20  1105   WBC 9.92   HGB 11.7*   HCT 38.5*   *     CMP:   Recent Labs   Lab 10/29/20  1105   *   K 4.1      CO2 25   GLU 89   BUN 10   CREATININE 0.8   CALCIUM 9.9   PROT 8.6*   ALBUMIN 3.4*   BILITOT 0.3   ALKPHOS 59   AST 19   ALT 21   ANIONGAP 9   EGFRNONAA >60.0       Significant Imaging: I have reviewed all pertinent imaging results/findings within the past 24 hours.

## 2020-10-30 NOTE — ASSESSMENT & PLAN NOTE
31yo morbidly obese male without any other significant medical history who presented to [facility] as transfer from OSH for evaluation given recurrent left sided pneumothoraces. Thoracic surgery consulted for evaluation and potential surgical intervention      - S/p bronch, endobronchial valve placement in basal segmental bronchus of LLL  - Clamp chest tube. CXR ordered for 1130  - Plan to remove chest tube if no pneumothorax with clamp trial

## 2020-10-30 NOTE — PROGRESS NOTES
[facility]  Thoracic Surgery  Progress Note    Subjective:     History of Present Illness:  Patient is a 29yo morbidly obese male without any other significant medical history who presented to [facility] as transfer from [facility] for evaluation given recurrent left sided pneumothoraces. Patient reports prior to mid-September he was in his usual state of health. Around 9/20 he developed flu-like symptoms with fatigue, congestion and some shortness of breath. He later experienced appreciable left sided chest pain, reportedly worse with inspiration and presented to the ED where he was found to have a LLL cavitary lesion.  An AFB smear was positive for TB and MRSA, but TB/MAC PCR was negative. He was discharged with augmentin for 30 days because also growing gram negative rods. He was progressing at home until 10/16 when sharp left sided chest pain and shortness of breath prompted return to the ED at the OSH. Here, he was found to have a PTX which was thought to be 2/2 to his cavitary lesion. A chest tube was placed at that time with resolution of his symptoms; however, throughout his stay he has three recurrences of pneumothoraces requiring additional chest tubes for a total of 3 and return to suction. He reports large upper chest tube was placed most recently prior to his transfer here for second opinion. He reports his symptoms typically resolve with tube placement but recur whenever taken off suction. He denies any travel history or sick contacts that he knows of. He denies fevers, chills, n/v/d or cough. Non-smoker.     Post-Op Info:  Procedure(s) (LRB):  BRONCHOSCOPY, FLEXIBLE, WITH ENDOBRONCHIAL VALVE INSERTION (N/A)   2 Days Post-Op     Interval History: Did well overnight with chest tube to waterseal   Pigtail catheter removed yesterday   AFVSS stable on RA    Medications:  Continuous Infusions:  Scheduled Meds:   ferrous sulfate  325 mg Oral Daily    folic acid-vit B6-vit B12 2.5-25-2 mg  1 tablet  Oral Daily    senna  8.6 mg Oral Daily     PRN Meds:acetaminophen, albuterol, dextrose 50%, dextrose 50%, erythromycin, glucagon (human recombinant), glucose, glucose, morphine, sodium chloride 0.9%, sodium chloride 0.9%     Review of patient's allergies indicates:   Allergen Reactions    Bactrim [sulfamethoxazole-trimethoprim]      G6PD     Shellfish containing products      Objective:     Vital Signs (Most Recent):  Temp: 97.9 °F (36.6 °C) (10/30/20 0422)  Pulse: 109 (10/30/20 0422)  Resp: 18 (10/30/20 0422)  BP: (!) 101/52 (10/30/20 0422)  SpO2: (!) 91 % (10/30/20 0422) Vital Signs (24h Range):  Temp:  [96.4 °F (35.8 °C)-98.5 °F (36.9 °C)] 97.9 °F (36.6 °C)  Pulse:  [] 109  Resp:  [14-20] 18  SpO2:  [91 %-100 %] 91 %  BP: (101-139)/(52-78) 101/52     Intake/Output - Last 3 Shifts       10/28 0700 - 10/29 0659 10/29 0700 - 10/30 0659 10/30 0700 - 10/31 0659    P.O. 200      I.V. (mL/kg) 1000 (5)      Total Intake(mL/kg) 1200 (6.1)      Urine (mL/kg/hr) 1900 (0.4)      Chest Tube 80      Total Output 1980      Net -780             Urine Occurrence 6 x      Stool Occurrence  1 x           SpO2: (!) 91 %  O2 Device (Oxygen Therapy): room air    Physical Exam  Vitals signs reviewed.   Constitutional:       General: He is not in acute distress.     Appearance: Normal appearance. He is obese. He is not ill-appearing.   HENT:      Head: Normocephalic and atraumatic.      Right Ear: External ear normal.      Left Ear: External ear normal.      Nose: Nose normal.      Mouth/Throat:      Pharynx: Oropharynx is clear.   Eyes:      Extraocular Movements: Extraocular movements intact.      Conjunctiva/sclera: Conjunctivae normal.      Pupils: Pupils are equal, round, and reactive to light.   Neck:      Musculoskeletal: Normal range of motion. No neck rigidity or muscular tenderness.   Cardiovascular:      Rate and Rhythm: Normal rate and regular rhythm.      Heart sounds: No murmur. No gallop.    Pulmonary:       Effort: Pulmonary effort is normal. No respiratory distress.      Breath sounds: No wheezing or rhonchi.      Comments: Left CT with SS output. Small air leak on cough  Abdominal:      General: There is no distension.      Palpations: Abdomen is soft.      Tenderness: There is no abdominal tenderness. There is no guarding.   Musculoskeletal:         General: No swelling, tenderness or deformity.      Right lower leg: No edema.      Left lower leg: No edema.   Skin:     General: Skin is warm and dry.      Coloration: Skin is not jaundiced.   Neurological:      General: No focal deficit present.      Mental Status: He is alert and oriented to person, place, and time. Mental status is at baseline.      Motor: No weakness.         Significant Labs:  ABGs: No results for input(s): PH, PCO2, PO2, HCO3, POCSATURATED, BE in the last 48 hours.  Amylase: No results for input(s): AMYLASE in the last 48 hours.  BMP:   Recent Labs   Lab 10/29/20  1105   GLU 89   *   K 4.1      CO2 25   BUN 10   CREATININE 0.8   CALCIUM 9.9     Cardiac markers: No results for input(s): CKMB, CPKMB, TROPONINT, TROPONINI, MYOGLOBIN in the last 48 hours.  CBC:   Recent Labs   Lab 10/29/20  1105   WBC 9.92   RBC 4.40*   HGB 11.7*   HCT 38.5*   *   MCV 88   MCH 26.6*   MCHC 30.4*       Significant Diagnostics:  I have reviewed all pertinent imaging results/findings within the past 24 hours.    VTE Risk Mitigation (From admission, onward)         Ordered     IP VTE LOW RISK PATIENT  Once      10/23/20 2321     Place sequential compression device  Until discontinued      10/23/20 2321              Assessment/Plan:     * Spontaneous tension pneumothorax  29yo morbidly obese male without any other significant medical history who presented to [facility] as transfer from OSH for evaluation given recurrent left sided pneumothoraces. Thoracic surgery consulted for evaluation and potential surgical intervention      - S/p bronch, endobronchial  valve placement in basal segmental bronchus of LLL  - Clamp chest tube. CXR ordered for 1130  - Plan to remove chest tube if no pneumothorax with clamp trial        Edward Wooten MD  Thoracic Surgery  [facility]

## 2020-10-30 NOTE — PLAN OF CARE
10/30/20 0839   Post-Acute Status   Post-Acute Authorization Other   Other Status No Post-Acute Service Needs

## 2020-10-30 NOTE — PLAN OF CARE
Problem: Adult Inpatient Plan of Care  Goal: Plan of Care Review  Outcome: Ongoing, Progressing  Goal: Patient-Specific Goal (Individualization)  Outcome: Ongoing, Progressing  Goal: Absence of Hospital-Acquired Illness or Injury  Outcome: Ongoing, Progressing  Goal: Optimal Comfort and Wellbeing  Outcome: Ongoing, Progressing  Goal: Readiness for Transition of Care  Outcome: Ongoing, Progressing  Goal: Rounds/Family Conference  Outcome: Ongoing, Progressing     Problem: Bariatric Environmental Safety  Goal: Safety Maintained with Care  Outcome: Ongoing, Progressing     Problem: Infection  Goal: Infection Symptom Resolution  Outcome: Ongoing, Progressing     Problem: Fall Injury Risk  Goal: Absence of Fall and Fall-Related Injury  Outcome: Ongoing, Progressing     Problem: Skin Injury Risk Increased  Goal: Skin Health and Integrity  Outcome: Ongoing, Progressing

## 2020-10-30 NOTE — SUBJECTIVE & OBJECTIVE
Interval History: Did well overnight with chest tube to waterseal   Pigtail catheter removed yesterday   AFVSS stable on RA    Medications:  Continuous Infusions:  Scheduled Meds:   ferrous sulfate  325 mg Oral Daily    folic acid-vit B6-vit B12 2.5-25-2 mg  1 tablet Oral Daily    senna  8.6 mg Oral Daily     PRN Meds:acetaminophen, albuterol, dextrose 50%, dextrose 50%, erythromycin, glucagon (human recombinant), glucose, glucose, morphine, sodium chloride 0.9%, sodium chloride 0.9%     Review of patient's allergies indicates:   Allergen Reactions    Bactrim [sulfamethoxazole-trimethoprim]      G6PD     Shellfish containing products      Objective:     Vital Signs (Most Recent):  Temp: 97.9 °F (36.6 °C) (10/30/20 0422)  Pulse: 109 (10/30/20 0422)  Resp: 18 (10/30/20 0422)  BP: (!) 101/52 (10/30/20 0422)  SpO2: (!) 91 % (10/30/20 0422) Vital Signs (24h Range):  Temp:  [96.4 °F (35.8 °C)-98.5 °F (36.9 °C)] 97.9 °F (36.6 °C)  Pulse:  [] 109  Resp:  [14-20] 18  SpO2:  [91 %-100 %] 91 %  BP: (101-139)/(52-78) 101/52     Intake/Output - Last 3 Shifts       10/28 0700 - 10/29 0659 10/29 0700 - 10/30 0659 10/30 0700 - 10/31 0659    P.O. 200      I.V. (mL/kg) 1000 (5)      Total Intake(mL/kg) 1200 (6.1)      Urine (mL/kg/hr) 1900 (0.4)      Chest Tube 80      Total Output 1980      Net -780             Urine Occurrence 6 x      Stool Occurrence  1 x           SpO2: (!) 91 %  O2 Device (Oxygen Therapy): room air    Physical Exam  Vitals signs reviewed.   Constitutional:       General: He is not in acute distress.     Appearance: Normal appearance. He is obese. He is not ill-appearing.   HENT:      Head: Normocephalic and atraumatic.      Right Ear: External ear normal.      Left Ear: External ear normal.      Nose: Nose normal.      Mouth/Throat:      Pharynx: Oropharynx is clear.   Eyes:      Extraocular Movements: Extraocular movements intact.      Conjunctiva/sclera: Conjunctivae normal.      Pupils: Pupils are  equal, round, and reactive to light.   Neck:      Musculoskeletal: Normal range of motion. No neck rigidity or muscular tenderness.   Cardiovascular:      Rate and Rhythm: Normal rate and regular rhythm.      Heart sounds: No murmur. No gallop.    Pulmonary:      Effort: Pulmonary effort is normal. No respiratory distress.      Breath sounds: No wheezing or rhonchi.      Comments: Left CT with SS output. Small air leak on cough  Abdominal:      General: There is no distension.      Palpations: Abdomen is soft.      Tenderness: There is no abdominal tenderness. There is no guarding.   Musculoskeletal:         General: No swelling, tenderness or deformity.      Right lower leg: No edema.      Left lower leg: No edema.   Skin:     General: Skin is warm and dry.      Coloration: Skin is not jaundiced.   Neurological:      General: No focal deficit present.      Mental Status: He is alert and oriented to person, place, and time. Mental status is at baseline.      Motor: No weakness.         Significant Labs:  ABGs: No results for input(s): PH, PCO2, PO2, HCO3, POCSATURATED, BE in the last 48 hours.  Amylase: No results for input(s): AMYLASE in the last 48 hours.  BMP:   Recent Labs   Lab 10/29/20  1105   GLU 89   *   K 4.1      CO2 25   BUN 10   CREATININE 0.8   CALCIUM 9.9     Cardiac markers: No results for input(s): CKMB, CPKMB, TROPONINT, TROPONINI, MYOGLOBIN in the last 48 hours.  CBC:   Recent Labs   Lab 10/29/20  1105   WBC 9.92   RBC 4.40*   HGB 11.7*   HCT 38.5*   *   MCV 88   MCH 26.6*   MCHC 30.4*       Significant Diagnostics:  I have reviewed all pertinent imaging results/findings within the past 24 hours.    VTE Risk Mitigation (From admission, onward)         Ordered     IP VTE LOW RISK PATIENT  Once      10/23/20 2321     Place sequential compression device  Until discontinued      10/23/20 2321

## 2020-10-30 NOTE — PLAN OF CARE
Problem: Adult Inpatient Plan of Care  Goal: Plan of Care Review  Outcome: Ongoing, Progressing  Goal: Patient-Specific Goal (Individualization)  Outcome: Ongoing, Progressing  Goal: Absence of Hospital-Acquired Illness or Injury  Outcome: Ongoing, Progressing  Goal: Optimal Comfort and Wellbeing  Outcome: Ongoing, Progressing  Goal: Readiness for Transition of Care  Outcome: Ongoing, Progressing  Goal: Rounds/Family Conference  Outcome: Ongoing, Progressing     Problem: Bariatric Environmental Safety  Goal: Safety Maintained with Care  Outcome: Ongoing, Progressing     Problem: Infection  Goal: Infection Symptom Resolution  Outcome: Ongoing, Progressing     Problem: Fall Injury Risk  Goal: Absence of Fall and Fall-Related Injury  Outcome: Ongoing, Progressing     Problem: Skin Injury Risk Increased  Goal: Skin Health and Integrity  Outcome: Ongoing, Progressing     Patient A&Ox4. Chest xray was done at the bedside today. Airborne precaution was removed. The plan is for chest tubes to be removed tomorrow. VS were stable throughout shift. Patient calm and cooperative. Plan of care and patient education reviewed at bedside. Patient verbalized understanding. Safety maintained during shift.

## 2020-10-30 NOTE — ASSESSMENT & PLAN NOTE
Patient is a 29 yo M with obesity who presented as a transfer for complaints of SOB and fatigue and was found to have recurrent tension pneumothorax with chest tube placement. Currently with 2 (1 pigtail and 1 thoracostomy tube) chest tubes after initial one failed. Initially tension pneumothorax presumably due to his infectious cavitary lesion. Here for second opinion. Patient develops symptoms after clamped. On 10/20, CT showed large left pneumothroax with mediastinal shift to right and large volume subcutaneous emphysema in left chest wall. Subsequent CXR showed adequate expansion after Chest tube adjusment. Repeat CXR here at [facility] show's small apical left sided pneumothorax due to his cavitary lesion. Review of previous CT from [facility] and CT done this admission show no signs of cavitary lesion. Daily CXR show unchanged pneumothorax and persistent subcutaneous emphysema. Patient continues to have serosanguinous drainage without any acute changes and denies any new SOB or respiratory complaints. Patient has continued to not require oxygen and has had minimal pain requirements. Plan is for discharge with follow up with PCP. Given large body habitus plan for evaluation overnight for pneumothorax with planned removal in AM and possible CT prior to discharge on 10/31.     Plan  - bronchoscopy with endobronchial valve placement POD 2  -  chest tube clamped, drain on side of bed  - Consult thoracic surgery for recommendations with possible removal of chest tubes tomorrow  - Pain regimen added   - Supplemental O2 as needed  - PRN albuterol for wheezing

## 2020-10-30 NOTE — PROGRESS NOTES
[facility] Hospital Medicine  Progress Note    Patient Name: Reggie An  MRN: [id number]  Patient Class: IP- Inpatient   Admission Date: 10/23/2020  Length of Stay: 7 days  Attending Physician: Elizabeth Herman MD  Primary Care Provider: Primary Doctor [facility] Medicine Team: [facility] MED 2 Ritesh Novoa MD    Subjective:     Principal Problem:Spontaneous tension pneumothorax        HPI:  Mr. An is a 30 y.o. M with morbid obesity presenting of left sided chest pain and SOB since 10/16. He was transferred here from [facility] for a second opinion. Around the week prior to 9/20, patient reported flu like symptoms such as fatigue, some nasal congestion and SOB. He said he started to feel better and his cold symptoms were improving but Around 9/20 he developed significant left sided chest pain, worse with inspiration. He went to the ER and was found to have a LLL cavitary lesion. An AFB smear was positive for TB and MRSA, but TB/MAC PCR was negative. He was discharged with augmentin for 30 days because also growing gram negative rods. About another week goes by and his left sided chest pain and SOB comes back. He goes back to [facility] ER on 10/16 and was found to have a pneumothroax likely secondary to his cavitary lesion. A chest tube was placed with improvement of symptoms. However, during his stay he had intermittent episodes of chest pain and sob and repeat imaging showed recurring pneumothorax. Another chest tube was placed and he was transferred to [facility] for a second opinion regarding recurrent pneumothorax. He states that when ever his chest tube is clamped he develops symptoms. He denies any travel history or sick contacts that he knows of. He denies fevers, chills, n/v/d or cough.            Overview/Hospital Course:  Patient was admitted for evaluation of possible cavitary lesion and is being followed by general surgery and infectious disease for recommendations. Records from [facility]  were obtained which included previous CT which showed no sign of cavitary lesion. Patient taken to be taken to OR on 10/28 for bronchoscopy and endobronchial valve placement. Patient was seen by thoracic surgery on 10/29 who clamped the pigtail catheter and repeated the CXR with plans to clamp the larger bore catheter and remove both chest tubes in AM. Records from [facility] show 3x negative TB PCR. Infectious disease recommends outpatient follow up in 2 weeks with no recommended antibiotic therapy at this time.On 10/30 pigtail catheter was removed and large bore catheter was clamped as well. Repeat CXR shows no change in pneumothorax. Repeat evaluation of cavitary lesion by CT chest recommended by thoracic in AM prior to discharge.     Interval History: NAEON. Patient seen this AM with no complaints currently and has no worsening respiratory symptoms since removal of pigtail catheter.     Review of Systems   Constitutional: Negative for chills, fatigue and fever.   HENT: Negative for hearing loss, rhinorrhea and sinus pain.    Eyes: Negative for photophobia and visual disturbance.   Respiratory: Negative for cough, choking, chest tightness, shortness of breath and wheezing.         Resolving tenderness associated with chest tube, subcutaneous emphysema and sutures   Cardiovascular: Negative for chest pain and palpitations.   Gastrointestinal: Negative for abdominal distention, abdominal pain, diarrhea and nausea.   Genitourinary: Negative for difficulty urinating, dysuria, flank pain and hematuria.   Musculoskeletal: Negative for back pain, joint swelling and myalgias.   Skin: Negative for color change and pallor.   Neurological: Negative for dizziness, facial asymmetry, weakness and headaches.   Psychiatric/Behavioral: Negative for agitation and confusion.     Objective:     Vital Signs (Most Recent):  Temp: 96.1 °F (35.6 °C) (10/30/20 1528)  Pulse: 107 (10/30/20 1528)  Resp: 16 (10/30/20 1528)  BP: 136/83 (10/30/20  1528)  SpO2: (!) 94 % (10/30/20 1528) Vital Signs (24h Range):  Temp:  [96.1 °F (35.6 °C)-98.5 °F (36.9 °C)] 96.1 °F (35.6 °C)  Pulse:  [] 107  Resp:  [16-20] 16  SpO2:  [91 %-97 %] 94 %  BP: (101-136)/(52-83) 136/83     Weight: (!) 198.2 kg (437 lb)  Body mass index is 56.11 kg/m².  No intake or output data in the 24 hours ending 10/30/20 1618   Physical Exam  Vitals signs and nursing note reviewed.   Constitutional:       General: He is not in acute distress.     Appearance: Normal appearance. He is obese. He is not ill-appearing.   HENT:      Head: Normocephalic and atraumatic.      Right Ear: External ear normal.      Left Ear: External ear normal.      Nose: Nose normal.      Mouth/Throat:      Pharynx: Oropharynx is clear.   Eyes:      Extraocular Movements: Extraocular movements intact.      Conjunctiva/sclera: Conjunctivae normal.      Pupils: Pupils are equal, round, and reactive to light.   Neck:      Musculoskeletal: Normal range of motion. No neck rigidity or muscular tenderness.   Cardiovascular:      Rate and Rhythm: Normal rate and regular rhythm.      Heart sounds: No murmur. No gallop.    Pulmonary:      Effort: Pulmonary effort is normal. No respiratory distress.      Breath sounds: No wheezing or rhonchi.      Comments: chest tubes placed left upper chest thoracostomy tube to suction draining serosanguinous fluid that is now clamped  Abdominal:      General: There is no distension.      Palpations: Abdomen is soft.      Tenderness: There is no abdominal tenderness. There is no guarding.   Musculoskeletal:         General: No swelling, tenderness or deformity.      Right lower leg: No edema.      Left lower leg: No edema.   Skin:     General: Skin is warm and dry.      Coloration: Skin is not jaundiced.   Neurological:      General: No focal deficit present.      Mental Status: He is alert and oriented to person, place, and time. Mental status is at baseline.      Motor: No weakness.    Psychiatric:         Mood and Affect: Mood normal.         Behavior: Behavior normal.         Significant Labs:   CBC:   Recent Labs   Lab 10/29/20  1105   WBC 9.92   HGB 11.7*   HCT 38.5*   *     CMP:   Recent Labs   Lab 10/29/20  1105   *   K 4.1      CO2 25   GLU 89   BUN 10   CREATININE 0.8   CALCIUM 9.9   PROT 8.6*   ALBUMIN 3.4*   BILITOT 0.3   ALKPHOS 59   AST 19   ALT 21   ANIONGAP 9   EGFRNONAA >60.0       Significant Imaging: I have reviewed all pertinent imaging results/findings within the past 24 hours.      Assessment/Plan:      * Spontaneous tension pneumothorax  Patient is a 29 yo M with obesity who presented as a transfer for complaints of SOB and fatigue and was found to have recurrent tension pneumothorax with chest tube placement. Currently with 2 (1 pigtail and 1 thoracostomy tube) chest tubes after initial one failed. Initially tension pneumothorax presumably due to his infectious cavitary lesion. Here for second opinion. Patient develops symptoms after clamped. On 10/20, CT showed large left pneumothroax with mediastinal shift to right and large volume subcutaneous emphysema in left chest wall. Subsequent CXR showed adequate expansion after Chest tube adjusment. Repeat CXR here at [facility] show's small apical left sided pneumothorax due to his cavitary lesion. Review of previous CT from [facility] and CT done this admission show no signs of cavitary lesion. Daily CXR show unchanged pneumothorax and persistent subcutaneous emphysema. Patient continues to have serosanguinous drainage without any acute changes and denies any new SOB or respiratory complaints. Patient has continued to not require oxygen and has had minimal pain requirements. Plan is for discharge with follow up with PCP. Given large body habitus plan for evaluation overnight for pneumothorax with planned removal in AM and possible CT prior to discharge on 10/31.     Plan  - bronchoscopy with endobronchial valve  placement POD 2  -  chest tube clamped, drain on side of bed  - Consult thoracic surgery for recommendations with possible removal of chest tubes tomorrow  - Pain regimen added   - Supplemental O2 as needed  - PRN albuterol for wheezing          Cavitary lesion of lung  Sputum cultures at [facility] MRSA+, + 1/3 AFB cultures, TB/MAC PCR negative.   9/21 at [facility], CT chest demonstrated a 5.7 x 4.4 cm left lower lobe cavitary lesion. Was initially treated at [facility] with augmentin after growing gram negative rods on cultures, however was also growing MRSA. Today he was started on PO clindamycin at [facility] today. On 10/20 CT surgery was consulted for wedge vs lobectomy, but no surgical intervention was done due to no significant blebs and likely infectious etiology. Records from [facility] showed no sign of cavitary lesion and  plans to proceed with bronchoscopy and endobronchial valve placement in AM on 10/28. Patient continues to be stable and afebrile while off antibiotics and will likely not require abx this admission. Patient will follow up with ID in 2 weeks.     Plan  - Follow up with ID outpatient in 2 weeks   - [facility] did draw tube drainage bacterial, fungal and AFB cultures  - Will monitor patient off abx      Secondary spontaneous pneumothorax  See spontaneous pneumothorax      G6PD deficiency  Patient found to have G6PD level of 2.4 with normal value of 7-25. Patient was tested based on possible treatment with oxidizing agent for TB or cavitary lesions. Allergies to Sulfa placed in chart.    Plan  - Avoid oxidizing agents and monitor for signs of worsening anemia    Folate deficiency anemia  - continue PO Folate outpatient       Iron deficiency anemia  - continue PO Iron outpatient       Morbid obesity with body mass index of 50.0-59.9 in adult  - diabetic diet        VTE Risk Mitigation (From admission, onward)         Ordered     IP VTE LOW RISK PATIENT  Once      10/23/20 2321     Place sequential compression  device  Until discontinued      10/23/20 2321                Discharge Planning   RAMESH: 10/31/2020     Code Status: Full Code   Is the patient medically ready for discharge?:     Reason for patient still in hospital (select all that apply): Consult recommendations and Pending disposition  Discharge Plan A: Home   Discharge Delays: None known at this time              Ritesh Novoa MD  Department of Hospital Medicine   [facility]

## 2020-10-30 NOTE — PLAN OF CARE
Pt dc home with follow up appts complete, transportation arranged per pt.  Future Appointments   Date Time Provider Department Center   11/10/2020  9:30 AM Lupe Burgess MD [facility] [address]   11/12/2020  8:40 AM Renny Sosa PA-C [facility]          10/30/20 1337   Final Note   Assessment Type Final Discharge Note   Anticipated Discharge Disposition Home   Hospital Follow Up  Appt(s) scheduled? Yes   Post-Acute Status   Post-Acute Authorization HME   Discharge Delays None known at this time   Trena Sierra, MSN  Case Management  Ext [phone]

## 2020-10-31 LAB
ALBUMIN SERPL BCP-MCNC: 2.9 G/DL (ref 3.5–5.2)
ALP SERPL-CCNC: 51 U/L (ref 55–135)
ALT SERPL W/O P-5'-P-CCNC: 19 U/L (ref 10–44)
ANION GAP SERPL CALC-SCNC: 8 MMOL/L (ref 8–16)
AST SERPL-CCNC: 17 U/L (ref 10–40)
BASOPHILS # BLD AUTO: 0.05 K/UL (ref 0–0.2)
BASOPHILS NFR BLD: 0.7 % (ref 0–1.9)
BILIRUB SERPL-MCNC: 0.2 MG/DL (ref 0.1–1)
BUN SERPL-MCNC: 12 MG/DL (ref 6–20)
CALCIUM SERPL-MCNC: 8.9 MG/DL (ref 8.7–10.5)
CHLORIDE SERPL-SCNC: 100 MMOL/L (ref 95–110)
CO2 SERPL-SCNC: 28 MMOL/L (ref 23–29)
CREAT SERPL-MCNC: 0.9 MG/DL (ref 0.5–1.4)
DIFFERENTIAL METHOD: ABNORMAL
EOSINOPHIL # BLD AUTO: 0.5 K/UL (ref 0–0.5)
EOSINOPHIL NFR BLD: 6.3 % (ref 0–8)
ERYTHROCYTE [DISTWIDTH] IN BLOOD BY AUTOMATED COUNT: 14.1 % (ref 11.5–14.5)
EST. GFR  (AFRICAN AMERICAN): >60 ML/MIN/1.73 M^2
EST. GFR  (NON AFRICAN AMERICAN): >60 ML/MIN/1.73 M^2
GLUCOSE SERPL-MCNC: 103 MG/DL (ref 70–110)
HCT VFR BLD AUTO: 37.8 % (ref 40–54)
HGB BLD-MCNC: 11.2 G/DL (ref 14–18)
IMM GRANULOCYTES # BLD AUTO: 0.02 K/UL (ref 0–0.04)
IMM GRANULOCYTES NFR BLD AUTO: 0.3 % (ref 0–0.5)
LYMPHOCYTES # BLD AUTO: 2.5 K/UL (ref 1–4.8)
LYMPHOCYTES NFR BLD: 33 % (ref 18–48)
MCH RBC QN AUTO: 26.7 PG (ref 27–31)
MCHC RBC AUTO-ENTMCNC: 29.6 G/DL (ref 32–36)
MCV RBC AUTO: 90 FL (ref 82–98)
MONOCYTES # BLD AUTO: 0.9 K/UL (ref 0.3–1)
MONOCYTES NFR BLD: 11.5 % (ref 4–15)
NEUTROPHILS # BLD AUTO: 3.6 K/UL (ref 1.8–7.7)
NEUTROPHILS NFR BLD: 48.2 % (ref 38–73)
NRBC BLD-RTO: 0 /100 WBC
PLATELET # BLD AUTO: 498 K/UL (ref 150–350)
PMV BLD AUTO: 10 FL (ref 9.2–12.9)
POTASSIUM SERPL-SCNC: 4 MMOL/L (ref 3.5–5.1)
PROT SERPL-MCNC: 7.7 G/DL (ref 6–8.4)
RBC # BLD AUTO: 4.19 M/UL (ref 4.6–6.2)
SODIUM SERPL-SCNC: 136 MMOL/L (ref 136–145)
WBC # BLD AUTO: 7.45 K/UL (ref 3.9–12.7)

## 2020-10-31 PROCEDURE — 11000001 HC ACUTE MED/SURG PRIVATE ROOM

## 2020-10-31 PROCEDURE — 85025 COMPLETE CBC W/AUTO DIFF WBC: CPT

## 2020-10-31 PROCEDURE — 36415 COLL VENOUS BLD VENIPUNCTURE: CPT

## 2020-10-31 PROCEDURE — 94640 AIRWAY INHALATION TREATMENT: CPT

## 2020-10-31 PROCEDURE — 99900035 HC TECH TIME PER 15 MIN (STAT)

## 2020-10-31 PROCEDURE — 27100098 HC SPACER

## 2020-10-31 PROCEDURE — 80053 COMPREHEN METABOLIC PANEL: CPT

## 2020-10-31 PROCEDURE — 25000003 PHARM REV CODE 250: Performed by: STUDENT IN AN ORGANIZED HEALTH CARE EDUCATION/TRAINING PROGRAM

## 2020-10-31 PROCEDURE — 99232 SBSQ HOSP IP/OBS MODERATE 35: CPT | Mod: ,,, | Performed by: INTERNAL MEDICINE

## 2020-10-31 PROCEDURE — 99232 PR SUBSEQUENT HOSPITAL CARE,LEVL II: ICD-10-PCS | Mod: ,,, | Performed by: INTERNAL MEDICINE

## 2020-10-31 PROCEDURE — 25000242 PHARM REV CODE 250 ALT 637 W/ HCPCS: Performed by: STUDENT IN AN ORGANIZED HEALTH CARE EDUCATION/TRAINING PROGRAM

## 2020-10-31 RX ADMIN — FERROUS SULFATE TAB EC 325 MG (65 MG FE EQUIVALENT) 325 MG: 325 (65 FE) TABLET DELAYED RESPONSE at 09:10

## 2020-10-31 RX ADMIN — ALBUTEROL SULFATE 2 PUFF: 90 AEROSOL, METERED RESPIRATORY (INHALATION) at 05:10

## 2020-10-31 RX ADMIN — FOLIC ACID-PYRIDOXINE-CYANOCOBALAMIN TAB 2.5-25-2 MG 1 TABLET: 2.5-25-2 TAB at 09:10

## 2020-10-31 NOTE — SUBJECTIVE & OBJECTIVE
Interval History: NAEON. Awaiting chest tube removal. Will follow with thoracic.     Review of Systems   Constitutional: Negative for chills, fatigue and fever.   HENT: Negative for hearing loss, rhinorrhea and sinus pain.    Eyes: Negative for photophobia and visual disturbance.   Respiratory: Negative for cough, choking, chest tightness, shortness of breath and wheezing.         Resolving tenderness associated with chest tube, subcutaneous emphysema and sutures   Cardiovascular: Negative for chest pain and palpitations.   Gastrointestinal: Negative for abdominal distention, abdominal pain, diarrhea and nausea.   Genitourinary: Negative for difficulty urinating, dysuria, flank pain and hematuria.   Musculoskeletal: Negative for back pain, joint swelling and myalgias.   Skin: Negative for color change and pallor.   Neurological: Negative for dizziness, facial asymmetry, weakness and headaches.   Psychiatric/Behavioral: Negative for agitation and confusion.     Objective:     Vital Signs (Most Recent):  Temp: 98.2 °F (36.8 °C) (10/31/20 0733)  Pulse: 108 (10/31/20 0733)  Resp: 20 (10/31/20 0733)  BP: (!) 146/65 (10/31/20 0733)  SpO2: 96 % (10/31/20 0733) Vital Signs (24h Range):  Temp:  [96.1 °F (35.6 °C)-98.4 °F (36.9 °C)] 98.2 °F (36.8 °C)  Pulse:  [101-111] 108  Resp:  [16-20] 20  SpO2:  [94 %-97 %] 96 %  BP: (130-146)/(63-83) 146/65     Weight: (!) 198.2 kg (437 lb)  Body mass index is 56.11 kg/m².  No intake or output data in the 24 hours ending 10/31/20 1125   Physical Exam  Vitals signs and nursing note reviewed.   Constitutional:       General: He is not in acute distress.     Appearance: Normal appearance. He is obese. He is not ill-appearing.   HENT:      Head: Normocephalic and atraumatic.      Right Ear: External ear normal.      Left Ear: External ear normal.      Nose: Nose normal.      Mouth/Throat:      Pharynx: Oropharynx is clear.   Eyes:      Extraocular Movements: Extraocular movements intact.       Conjunctiva/sclera: Conjunctivae normal.      Pupils: Pupils are equal, round, and reactive to light.   Neck:      Musculoskeletal: Normal range of motion. No neck rigidity or muscular tenderness.   Cardiovascular:      Rate and Rhythm: Normal rate and regular rhythm.      Heart sounds: No murmur. No gallop.    Pulmonary:      Effort: Pulmonary effort is normal. No respiratory distress.      Breath sounds: No wheezing or rhonchi.      Comments: chest tubes placed left upper chest thoracostomy tube to suction draining serosanguinous fluid that is now clamped  Abdominal:      General: There is no distension.      Palpations: Abdomen is soft.      Tenderness: There is no abdominal tenderness. There is no guarding.   Musculoskeletal:         General: No swelling, tenderness or deformity.      Right lower leg: No edema.      Left lower leg: No edema.   Skin:     General: Skin is warm and dry.      Coloration: Skin is not jaundiced.   Neurological:      General: No focal deficit present.      Mental Status: He is alert and oriented to person, place, and time. Mental status is at baseline.      Motor: No weakness.   Psychiatric:         Mood and Affect: Mood normal.         Behavior: Behavior normal.         Significant Labs:   CBC:   Recent Labs   Lab 10/31/20  0449   WBC 7.45   HGB 11.2*   HCT 37.8*   *     CMP:   Recent Labs   Lab 10/31/20  0449      K 4.0      CO2 28      BUN 12   CREATININE 0.9   CALCIUM 8.9   PROT 7.7   ALBUMIN 2.9*   BILITOT 0.2   ALKPHOS 51*   AST 17   ALT 19   ANIONGAP 8   EGFRNONAA >60.0       Significant Imaging: I have reviewed all pertinent imaging results/findings within the past 24 hours.

## 2020-10-31 NOTE — PROGRESS NOTES
[facility]  Thoracic Surgery  Progress Note    Subjective:     History of Present Illness:  Patient is a 29yo morbidly obese male without any other significant medical history who presented to [facility] [place] from [facility] for evaluation given recurrent left sided pneumothoraces. Patient reports prior to mid-September he was in his usual state of health. Around 9/20 he developed flu-like symptoms with fatigue, congestion and some shortness of breath. He later experienced appreciable left sided chest pain, reportedly worse with inspiration and presented to the ED where he was found to have a LLL cavitary lesion.  An AFB smear was positive for TB and MRSA, but TB/MAC PCR was negative. He was discharged with augmentin for 30 days because also growing gram negative rods. He was progressing at home until 10/16 when sharp left sided chest pain and shortness of breath prompted return to the ED at the OSH. Here, he was found to have a PTX which was thought to be 2/2 to his cavitary lesion. A chest tube was placed at that time with resolution of his symptoms; however, throughout his stay he has three recurrences of pneumothoraces requiring additional chest tubes for a total of 3 and return to suction. He reports large upper chest tube was placed most recently prior to his transfer here for second opinion. He reports his symptoms typically resolve with tube placement but recur whenever taken off suction. He denies any travel history or sick contacts that he knows of. He denies fevers, chills, n/v/d or cough. Non-smoker.     Post-Op Info:  Procedure(s) (LRB):  BRONCHOSCOPY, FLEXIBLE, WITH ENDOBRONCHIAL VALVE INSERTION (N/A)   3 Days Post-Op     Interval History: No acute events overnight. Undergoing clamp trial over past 24 hrs, tolerating well. No desats noted.  AFVSS stable on RA    Medications:  Continuous Infusions:  Scheduled Meds:   ferrous sulfate  325 mg Oral Daily    folic acid-vit B6-vit B12 2.5-25-2  mg  1 tablet Oral Daily    senna  8.6 mg Oral Daily     PRN Meds:acetaminophen, albuterol, dextrose 50%, dextrose 50%, erythromycin, glucagon (human recombinant), glucose, glucose, morphine, sodium chloride 0.9%, sodium chloride 0.9%     Review of patient's allergies indicates:   Allergen Reactions    Bactrim [sulfamethoxazole-trimethoprim]      G6PD     Shellfish containing products      Objective:     Vital Signs (Most Recent):  Temp: 98.2 °F (36.8 °C) (10/31/20 0733)  Pulse: 108 (10/31/20 0733)  Resp: 20 (10/31/20 0733)  BP: (!) 146/65 (10/31/20 0733)  SpO2: 96 % (10/31/20 0733) Vital Signs (24h Range):  Temp:  [96.1 °F (35.6 °C)-98.4 °F (36.9 °C)] 98.2 °F (36.8 °C)  Pulse:  [101-111] 108  Resp:  [16-20] 20  SpO2:  [94 %-97 %] 96 %  BP: (113-146)/(63-83) 146/65     Intake/Output - Last 3 Shifts       10/29 0700 - 10/30 0659 10/30 0700 - 10/31 0659 10/31 0700 - 11/01 0659    P.O.       I.V. (mL/kg)       Total Intake(mL/kg)       Urine (mL/kg/hr)       Chest Tube       Total Output       Net              Stool Occurrence 1 x            SpO2: 96 %  O2 Device (Oxygen Therapy): room air    Physical Exam  Vitals signs reviewed.   Constitutional:       General: He is not in acute distress.     Appearance: Normal appearance. He is obese. He is not ill-appearing.   HENT:      Head: Normocephalic and atraumatic.      Right Ear: External ear normal.      Left Ear: External ear normal.      Nose: Nose normal.      Mouth/Throat:      Pharynx: Oropharynx is clear.   Eyes:      Extraocular Movements: Extraocular movements intact.      Conjunctiva/sclera: Conjunctivae normal.      Pupils: Pupils are equal, round, and reactive to light.   Neck:      Musculoskeletal: Normal range of motion. No neck rigidity or muscular tenderness.   Cardiovascular:      Rate and Rhythm: Normal rate and regular rhythm.      Heart sounds: No murmur. No gallop.    Pulmonary:      Effort: Pulmonary effort is normal. No respiratory distress.       Breath sounds: No wheezing or rhonchi.      Comments: Left CT with SS output. Small air leak on cough  Abdominal:      General: There is no distension.      Palpations: Abdomen is soft.      Tenderness: There is no abdominal tenderness. There is no guarding.   Musculoskeletal:         General: No swelling, tenderness or deformity.      Right lower leg: No edema.      Left lower leg: No edema.   Skin:     General: Skin is warm and dry.      Coloration: Skin is not jaundiced.   Neurological:      General: No focal deficit present.      Mental Status: He is alert and oriented to person, place, and time. Mental status is at baseline.      Motor: No weakness.         Significant Labs:  ABGs: No results for input(s): PH, PCO2, PO2, HCO3, POCSATURATED, BE in the last 48 hours.  Amylase: No results for input(s): AMYLASE in the last 48 hours.  BMP:   Recent Labs   Lab 10/31/20  0449         K 4.0      CO2 28   BUN 12   CREATININE 0.9   CALCIUM 8.9     Cardiac markers: No results for input(s): CKMB, CPKMB, TROPONINT, TROPONINI, MYOGLOBIN in the last 48 hours.  CBC:   Recent Labs   Lab 10/31/20  0449   WBC 7.45   RBC 4.19*   HGB 11.2*   HCT 37.8*   *   MCV 90   MCH 26.7*   MCHC 29.6*       Significant Diagnostics:  I have reviewed all pertinent imaging results/findings within the past 24 hours.    VTE Risk Mitigation (From admission, onward)         Ordered     IP VTE LOW RISK PATIENT  Once      10/23/20 2321     Place sequential compression device  Until discontinued      10/23/20 2321              Assessment/Plan:     * Spontaneous tension pneumothorax  31yo morbidly obese male without any other significant medical history who presented to [facility] as transfer from OSH for evaluation given recurrent left sided pneumothoraces. Thoracic surgery consulted for evaluation and potential surgical intervention      - S/p bronch, endobronchial valve placement in basal segmental bronchus of LLL  - Currently  undergoing clamp trial of chest tube. No chest tube removal today.    - Repeat CXR this afternoon at 3pm and 11/1 5am  - Plan to remove chest tube tomorrow if no pneumothorax        Moon Crane MD  Thoracic Surgery  [facility]

## 2020-10-31 NOTE — PLAN OF CARE
Problem: Adult Inpatient Plan of Care  Goal: Plan of Care Review  Outcome: Ongoing, Progressing  Goal: Patient-Specific Goal (Individualization)  Outcome: Ongoing, Progressing  Goal: Absence of Hospital-Acquired Illness or Injury  Outcome: Ongoing, Progressing  Goal: Optimal Comfort and Wellbeing  Outcome: Ongoing, Progressing  Goal: Readiness for Transition of Care  Outcome: Ongoing, Progressing  Goal: Rounds/Family Conference  Outcome: Ongoing, Progressing     Problem: Bariatric Environmental Safety  Goal: Safety Maintained with Care  Outcome: Ongoing, Progressing     Problem: Infection  Goal: Infection Symptom Resolution  Outcome: Ongoing, Progressing     Problem: Fall Injury Risk  Goal: Absence of Fall and Fall-Related Injury  Outcome: Ongoing, Progressing     Problem: Skin Injury Risk Increased  Goal: Skin Health and Integrity  Outcome: Ongoing, Progressing     Patient A&Ox4. VS were stable throughout shift. Patient is waiting on chest xray tomorrow morning to determine if chest tube is ready to come out. Patient expecting discharge once chest tube is removed. Patient was calm and cooperative. Plan of care and patient education reviewed at the bedside. Patient verbalized understanding. Safety was maintained during shift.

## 2020-10-31 NOTE — ASSESSMENT & PLAN NOTE
Sputum cultures at [facility] MRSA+, + 1/3 AFB cultures, TB/MAC PCR negative.   9/21 at [facility], CT chest demonstrated a 5.7 x 4.4 cm left lower lobe cavitary lesion. Was initially treated at [facility] with augmentin after growing gram negative rods on cultures, however was also growing MRSA. Today he was started on PO clindamycin at [facility] today. On 10/20 CT surgery was consulted for wedge vs lobectomy, but no surgical intervention was done due to no significant blebs and likely infectious etiology. Records from [facility] showed no sign of cavitary lesion and  plans to proceed with bronchoscopy and endobronchial valve placement in AM on 10/28. Patient continues to be stable and afebrile while off antibiotics and will likely not require abx this admission. Patient will follow up with ID in 2 weeks.     Plan  - Follow up with ID outpatient in 2 weeks   - [facility] did draw tube drainage bacterial, fungal and AFB cultures  - Will monitor patient off abx

## 2020-10-31 NOTE — ASSESSMENT & PLAN NOTE
Patient is a 29 yo M with obesity who presented as a transfer for complaints of SOB and fatigue and was found to have recurrent tension pneumothorax with chest tube placement. Currently with 2 (1 pigtail and 1 thoracostomy tube) chest tubes after initial one failed. Initially tension pneumothorax presumably due to his infectious cavitary lesion. Here for second opinion. Patient develops symptoms after clamped. On 10/20, CT showed large left pneumothroax with mediastinal shift to right and large volume subcutaneous emphysema in left chest wall. Subsequent CXR showed adequate expansion after Chest tube adjusment. Repeat CXR here at [facility] show's small apical left sided pneumothorax due to his cavitary lesion. Review of previous CT from [facility] and CT done this admission show no signs of cavitary lesion. Daily CXR show unchanged pneumothorax and persistent subcutaneous emphysema. Patient continues to have serosanguinous drainage without any acute changes and denies any new SOB or respiratory complaints. Patient has continued to not require oxygen and has had minimal pain requirements. Plan is for discharge with follow up with PCP. Given large body habitus plan for evaluation overnight for pneumothorax with planned removal in AM and possible CT prior to discharge on 10/31.     Plan  - bronchoscopy with endobronchial valve placement POD 2  -  chest tube clamped, drain on side of bed  - Will follow with thoracic for removal of chest tube and subsequent DC  - Pain regimen added   - Supplemental O2 as needed  - PRN albuterol for wheezing

## 2020-10-31 NOTE — PROGRESS NOTES
[facility] Hospital Medicine  Progress Note    Patient Name: Reggie An  MRN: [id number]  Patient Class: IP- Inpatient   Admission Date: 10/23/2020  Length of Stay: 8 days  Attending Physician: Elizabeth Herman MD  Primary Care Provider: Primary Doctor [facility] Medicine Team: [facility]OSP MED 2 Zainab Carlton MD    Subjective:     Principal Problem:Spontaneous tension pneumothorax        HPI:  Mr. An is a 30 y.o. M with morbid obesity presenting of left sided chest pain and SOB since 10/16. He was transferred here from [facility] for a second opinion. Around the week prior to 9/20, patient reported flu like symptoms such as fatigue, some nasal congestion and SOB. He said he started to feel better and his cold symptoms were improving but Around 9/20 he developed significant left sided chest pain, worse with inspiration. He went to the ER and was found to have a LLL cavitary lesion. An AFB smear was positive for TB and MRSA, but TB/MAC PCR was negative. He was discharged with augmentin for 30 days because also growing gram negative rods. About another week goes by and his left sided chest pain and SOB comes back. He goes back to [facility] ER on 10/16 and was found to have a pneumothroax likely secondary to his cavitary lesion. A chest tube was placed with improvement of symptoms. However, during his stay he had intermittent episodes of chest pain and sob and repeat imaging showed recurring pneumothorax. Another chest tube was placed and he was transferred to [facility] for a second opinion regarding recurrent pneumothorax. He states that when ever his chest tube is clamped he develops symptoms. He denies any travel history or sick contacts that he knows of. He denies fevers, chills, n/v/d or cough.            Overview/Hospital Course:  Patient was admitted for evaluation of possible cavitary lesion and is being followed by general surgery and infectious disease for recommendations. Records from [facility]  were obtained which included previous CT which showed no sign of cavitary lesion. Patient taken to be taken to OR on 10/28 for bronchoscopy and endobronchial valve placement. Patient was seen by thoracic surgery on 10/29 who clamped the pigtail catheter and repeated the CXR with plans to clamp the larger bore catheter and remove both chest tubes in AM. Records from [facility] show 3x negative TB PCR. Infectious disease recommends outpatient follow up in 2 weeks with no recommended antibiotic therapy at this time.On 10/30 pigtail catheter was removed and large bore catheter was clamped as well. Repeat CXR shows no change in pneumothorax. Repeat evaluation of cavitary lesion by CT chest recommended by thoracic in AM prior to discharge.     Interval History: NAEON. Awaiting chest tube removal. Will follow with thoracic.     Review of Systems   Constitutional: Negative for chills, fatigue and fever.   HENT: Negative for hearing loss, rhinorrhea and sinus pain.    Eyes: Negative for photophobia and visual disturbance.   Respiratory: Negative for cough, choking, chest tightness, shortness of breath and wheezing.         Resolving tenderness associated with chest tube, subcutaneous emphysema and sutures   Cardiovascular: Negative for chest pain and palpitations.   Gastrointestinal: Negative for abdominal distention, abdominal pain, diarrhea and nausea.   Genitourinary: Negative for difficulty urinating, dysuria, flank pain and hematuria.   Musculoskeletal: Negative for back pain, joint swelling and myalgias.   Skin: Negative for color change and pallor.   Neurological: Negative for dizziness, facial asymmetry, weakness and headaches.   Psychiatric/Behavioral: Negative for agitation and confusion.     Objective:     Vital Signs (Most Recent):  Temp: 98.2 °F (36.8 °C) (10/31/20 0733)  Pulse: 108 (10/31/20 0733)  Resp: 20 (10/31/20 0733)  BP: (!) 146/65 (10/31/20 0733)  SpO2: 96 % (10/31/20 0733) Vital Signs (24h Range):  Temp:   [96.1 °F (35.6 °C)-98.4 °F (36.9 °C)] 98.2 °F (36.8 °C)  Pulse:  [101-111] 108  Resp:  [16-20] 20  SpO2:  [94 %-97 %] 96 %  BP: (130-146)/(63-83) 146/65     Weight: (!) 198.2 kg (437 lb)  Body mass index is 56.11 kg/m².  No intake or output data in the 24 hours ending 10/31/20 1125   Physical Exam  Vitals signs and nursing note reviewed.   Constitutional:       General: He is not in acute distress.     Appearance: Normal appearance. He is obese. He is not ill-appearing.   HENT:      Head: Normocephalic and atraumatic.      Right Ear: External ear normal.      Left Ear: External ear normal.      Nose: Nose normal.      Mouth/Throat:      Pharynx: Oropharynx is clear.   Eyes:      Extraocular Movements: Extraocular movements intact.      Conjunctiva/sclera: Conjunctivae normal.      Pupils: Pupils are equal, round, and reactive to light.   Neck:      Musculoskeletal: Normal range of motion. No neck rigidity or muscular tenderness.   Cardiovascular:      Rate and Rhythm: Normal rate and regular rhythm.      Heart sounds: No murmur. No gallop.    Pulmonary:      Effort: Pulmonary effort is normal. No respiratory distress.      Breath sounds: No wheezing or rhonchi.      Comments: chest tubes placed left upper chest thoracostomy tube to suction draining serosanguinous fluid that is now clamped  Abdominal:      General: There is no distension.      Palpations: Abdomen is soft.      Tenderness: There is no abdominal tenderness. There is no guarding.   Musculoskeletal:         General: No swelling, tenderness or deformity.      Right lower leg: No edema.      Left lower leg: No edema.   Skin:     General: Skin is warm and dry.      Coloration: Skin is not jaundiced.   Neurological:      General: No focal deficit present.      Mental Status: He is alert and oriented to person, place, and time. Mental status is at baseline.      Motor: No weakness.   Psychiatric:         Mood and Affect: Mood normal.         Behavior:  Behavior normal.         Significant Labs:   CBC:   Recent Labs   Lab 10/31/20  0449   WBC 7.45   HGB 11.2*   HCT 37.8*   *     CMP:   Recent Labs   Lab 10/31/20  0449      K 4.0      CO2 28      BUN 12   CREATININE 0.9   CALCIUM 8.9   PROT 7.7   ALBUMIN 2.9*   BILITOT 0.2   ALKPHOS 51*   AST 17   ALT 19   ANIONGAP 8   EGFRNONAA >60.0       Significant Imaging: I have reviewed all pertinent imaging results/findings within the past 24 hours.      Assessment/Plan:      * Spontaneous tension pneumothorax  Patient is a 29 yo M with obesity who presented as a transfer for complaints of SOB and fatigue and was found to have recurrent tension pneumothorax with chest tube placement. Currently with 2 (1 pigtail and 1 thoracostomy tube) chest tubes after initial one failed. Initially tension pneumothorax presumably due to his infectious cavitary lesion. Here for second opinion. Patient develops symptoms after clamped. On 10/20, CT showed large left pneumothroax with mediastinal shift to right and large volume subcutaneous emphysema in left chest wall. Subsequent CXR showed adequate expansion after Chest tube adjusment. Repeat CXR here at [facility] show's small apical left sided pneumothorax due to his cavitary lesion. Review of previous CT from [facility] and CT done this admission show no signs of cavitary lesion. Daily CXR show unchanged pneumothorax and persistent subcutaneous emphysema. Patient continues to have serosanguinous drainage without any acute changes and denies any new SOB or respiratory complaints. Patient has continued to not require oxygen and has had minimal pain requirements. Plan is for discharge with follow up with PCP. Given large body habitus plan for evaluation overnight for pneumothorax with planned removal in AM and possible CT prior to discharge on 10/31.     Plan  - bronchoscopy with endobronchial valve placement POD 2  -  chest tube clamped, drain on side of bed  - Will follow  with thoracic for removal of chest tube and subsequent DC  - Pain regimen added   - Supplemental O2 as needed  - PRN albuterol for wheezing          Secondary spontaneous pneumothorax  See spontaneous pneumothorax      G6PD deficiency  Patient found to have G6PD level of 2.4 with normal value of 7-25. Patient was tested based on possible treatment with oxidizing agent for TB or cavitary lesions. Allergies to Sulfa placed in chart.    Plan  - Avoid oxidizing agents and monitor for signs of worsening anemia    Folate deficiency anemia  - continue PO Folate outpatient       Iron deficiency anemia  - continue PO Iron outpatient       Morbid obesity with body mass index of 50.0-59.9 in adult  - diabetic diet      Cavitary lesion of lung  Sputum cultures at [facility] MRSA+, + 1/3 AFB cultures, TB/MAC PCR negative.   9/21 at [facility], CT chest demonstrated a 5.7 x 4.4 cm left lower lobe cavitary lesion. Was initially treated at [facility] with augmentin after growing gram negative rods on cultures, however was also growing MRSA. Today he was started on PO clindamycin at [facility] today. On 10/20 CT surgery was consulted for wedge vs lobectomy, but no surgical intervention was done due to no significant blebs and likely infectious etiology. Records from [facility] showed no sign of cavitary lesion and  plans to proceed with bronchoscopy and endobronchial valve placement in AM on 10/28. Patient continues to be stable and afebrile while off antibiotics and will likely not require abx this admission. Patient will follow up with ID in 2 weeks.     Plan  - Follow up with ID outpatient in 2 weeks   - [facility] did draw tube drainage bacterial, fungal and AFB cultures  - Will monitor patient off abx        VTE Risk Mitigation (From admission, onward)         Ordered     IP VTE LOW RISK PATIENT  Once      10/23/20 2321     Place sequential compression device  Until discontinued      10/23/20 2321                Discharge Planning   RAMESH: 11/1/2020      Code Status: Full Code   Is the patient medically ready for discharge?:     Reason for patient still in hospital (select all that apply): Patient trending condition and Consult recommendations  Discharge Plan A: Home   Discharge Delays: None known at this time        Zainab Carlton MD  Department of Hospital Medicine   [facility]

## 2020-10-31 NOTE — PLAN OF CARE
Problem: Adult Inpatient Plan of Care  Goal: Plan of Care Review  Outcome: Ongoing, Progressing  Goal: Patient-Specific Goal (Individualization)  Outcome: Ongoing, Progressing  Goal: Absence of Hospital-Acquired Illness or Injury  Outcome: Ongoing, Progressing  Goal: Optimal Comfort and Wellbeing  Outcome: Ongoing, Progressing  Goal: Readiness for Transition of Care  Outcome: Ongoing, Progressing  Goal: Rounds/Family Conference  Outcome: Ongoing, Progressing     Problem: Bariatric Environmental Safety  Goal: Safety Maintained with Care  Outcome: Ongoing, Progressing    Patient is A&Ox4. VS were stable during shift. Patient is recovering from a spontaneous tension pneumothorax. Patient is waiting for chest tube to be removed prior to discharge.  Chest CT is scheduled for tomorrow morning to determine if chest tube removal is appropriate. Patient was calm, cooperative and looking forward to going home. Plan of care reviewed at the bedside. Patient verbalized understanding. Safety maintained during shift.

## 2020-10-31 NOTE — SUBJECTIVE & OBJECTIVE
Interval History: No acute events overnight. Undergoing clamp trial over past 24 hrs, tolerating well. No desats noted.  AFVSS stable on RA    Medications:  Continuous Infusions:  Scheduled Meds:   ferrous sulfate  325 mg Oral Daily    folic acid-vit B6-vit B12 2.5-25-2 mg  1 tablet Oral Daily    senna  8.6 mg Oral Daily     PRN Meds:acetaminophen, albuterol, dextrose 50%, dextrose 50%, erythromycin, glucagon (human recombinant), glucose, glucose, morphine, sodium chloride 0.9%, sodium chloride 0.9%     Review of patient's allergies indicates:   Allergen Reactions    Bactrim [sulfamethoxazole-trimethoprim]      G6PD     Shellfish containing products      Objective:     Vital Signs (Most Recent):  Temp: 98.2 °F (36.8 °C) (10/31/20 0733)  Pulse: 108 (10/31/20 0733)  Resp: 20 (10/31/20 0733)  BP: (!) 146/65 (10/31/20 0733)  SpO2: 96 % (10/31/20 0733) Vital Signs (24h Range):  Temp:  [96.1 °F (35.6 °C)-98.4 °F (36.9 °C)] 98.2 °F (36.8 °C)  Pulse:  [101-111] 108  Resp:  [16-20] 20  SpO2:  [94 %-97 %] 96 %  BP: (113-146)/(63-83) 146/65     Intake/Output - Last 3 Shifts       10/29 0700 - 10/30 0659 10/30 0700 - 10/31 0659 10/31 0700 - 11/01 0659    P.O.       I.V. (mL/kg)       Total Intake(mL/kg)       Urine (mL/kg/hr)       Chest Tube       Total Output       Net              Stool Occurrence 1 x            SpO2: 96 %  O2 Device (Oxygen Therapy): room air    Physical Exam  Vitals signs reviewed.   Constitutional:       General: He is not in acute distress.     Appearance: Normal appearance. He is obese. He is not ill-appearing.   HENT:      Head: Normocephalic and atraumatic.      Right Ear: External ear normal.      Left Ear: External ear normal.      Nose: Nose normal.      Mouth/Throat:      Pharynx: Oropharynx is clear.   Eyes:      Extraocular Movements: Extraocular movements intact.      Conjunctiva/sclera: Conjunctivae normal.      Pupils: Pupils are equal, round, and reactive to light.   Neck:       Musculoskeletal: Normal range of motion. No neck rigidity or muscular tenderness.   Cardiovascular:      Rate and Rhythm: Normal rate and regular rhythm.      Heart sounds: No murmur. No gallop.    Pulmonary:      Effort: Pulmonary effort is normal. No respiratory distress.      Breath sounds: No wheezing or rhonchi.      Comments: Left CT with SS output. Small air leak on cough  Abdominal:      General: There is no distension.      Palpations: Abdomen is soft.      Tenderness: There is no abdominal tenderness. There is no guarding.   Musculoskeletal:         General: No swelling, tenderness or deformity.      Right lower leg: No edema.      Left lower leg: No edema.   Skin:     General: Skin is warm and dry.      Coloration: Skin is not jaundiced.   Neurological:      General: No focal deficit present.      Mental Status: He is alert and oriented to person, place, and time. Mental status is at baseline.      Motor: No weakness.         Significant Labs:  ABGs: No results for input(s): PH, PCO2, PO2, HCO3, POCSATURATED, BE in the last 48 hours.  Amylase: No results for input(s): AMYLASE in the last 48 hours.  BMP:   Recent Labs   Lab 10/31/20  0449         K 4.0      CO2 28   BUN 12   CREATININE 0.9   CALCIUM 8.9     Cardiac markers: No results for input(s): CKMB, CPKMB, TROPONINT, TROPONINI, MYOGLOBIN in the last 48 hours.  CBC:   Recent Labs   Lab 10/31/20  0449   WBC 7.45   RBC 4.19*   HGB 11.2*   HCT 37.8*   *   MCV 90   MCH 26.7*   MCHC 29.6*       Significant Diagnostics:  I have reviewed all pertinent imaging results/findings within the past 24 hours.    VTE Risk Mitigation (From admission, onward)         Ordered     IP VTE LOW RISK PATIENT  Once      10/23/20 2321     Place sequential compression device  Until discontinued      10/23/20 2321

## 2020-10-31 NOTE — ASSESSMENT & PLAN NOTE
31yo morbidly obese male without any other significant medical history who [place] [facility] [place] for evaluation given recurrent left sided pneumothoraces. Thoracic surgery consulted for evaluation and potential surgical intervention      - S/p bronch, endobronchial valve placement in basal segmental bronchus of LLL  - Currently undergoing clamp trial of chest tube. No chest tube removal today.    - Repeat CXR this afternoon at 3pm and 11/1 5am  - Plan to remove chest tube tomorrow if no pneumothorax

## 2020-11-01 VITALS
DIASTOLIC BLOOD PRESSURE: 75 MMHG | HEIGHT: 74 IN | TEMPERATURE: 98 F | SYSTOLIC BLOOD PRESSURE: 136 MMHG | OXYGEN SATURATION: 99 % | WEIGHT: 315 LBS | HEART RATE: 108 BPM | RESPIRATION RATE: 18 BRPM | BODY MASS INDEX: 40.43 KG/M2

## 2020-11-01 PROCEDURE — 99239 HOSP IP/OBS DSCHRG MGMT >30: CPT | Mod: ,,, | Performed by: INTERNAL MEDICINE

## 2020-11-01 PROCEDURE — 99239 PR HOSPITAL DISCHARGE DAY,>30 MIN: ICD-10-PCS | Mod: ,,, | Performed by: INTERNAL MEDICINE

## 2020-11-01 PROCEDURE — 25000003 PHARM REV CODE 250: Performed by: STUDENT IN AN ORGANIZED HEALTH CARE EDUCATION/TRAINING PROGRAM

## 2020-11-01 RX ADMIN — FOLIC ACID-PYRIDOXINE-CYANOCOBALAMIN TAB 2.5-25-2 MG 1 TABLET: 2.5-25-2 TAB at 09:11

## 2020-11-01 RX ADMIN — FERROUS SULFATE TAB EC 325 MG (65 MG FE EQUIVALENT) 325 MG: 325 (65 FE) TABLET DELAYED RESPONSE at 09:11

## 2020-11-01 NOTE — ASSESSMENT & PLAN NOTE
Sputum cultures at [facility] MRSA+, + 1/3 AFB cultures, TB/MAC PCR negative.   9/21 at [facility], CT chest demonstrated a 5.7 x 4.4 cm left lower lobe cavitary lesion. Was initially treated at [facility] with augmentin after growing gram negative rods on cultures, however was also growing MRSA. Today he was started on PO clindamycin at [facility] today. On 10/20 CT surgery was consulted for wedge vs lobectomy, but no surgical intervention was done due to no significant blebs and likely infectious etiology. Records from [facility] showed no sign of cavitary lesion and  plans to proceed with bronchoscopy and endobronchial valve placement in AM on 10/28. Patient continues to be stable and afebrile while off antibiotics and will likely not require abx this admission. Patient will follow up with ID in 2 weeks.     Plan  - Follow up with ID outpatient in 2 weeks as well as PCP  - [facility] did draw tube drainage bacterial, fungal and AFB cultures  - Will monitor patient off abx

## 2020-11-01 NOTE — SUBJECTIVE & OBJECTIVE
Interval History: No acute events overnight. Interval CXRs improved. No desats noted.  No leaks noted today. AFVSS stable on RA.     Medications:  Continuous Infusions:  Scheduled Meds:   ferrous sulfate  325 mg Oral Daily    folic acid-vit B6-vit B12 2.5-25-2 mg  1 tablet Oral Daily    senna  8.6 mg Oral Daily     PRN Meds:acetaminophen, albuterol, dextrose 50%, dextrose 50%, erythromycin, glucagon (human recombinant), glucose, glucose, morphine, sodium chloride 0.9%, sodium chloride 0.9%     Review of patient's allergies indicates:   Allergen Reactions    Bactrim [sulfamethoxazole-trimethoprim]      G6PD     Shellfish containing products      Objective:     Vital Signs (Most Recent):  Temp: 97.7 °F (36.5 °C) (11/01/20 0105)  Pulse: 104 (11/01/20 0105)  Resp: 18 (11/01/20 0105)  BP: 119/70 (11/01/20 0105)  SpO2: (!) 94 % (11/01/20 0400) Vital Signs (24h Range):  Temp:  [97.7 °F (36.5 °C)-98.7 °F (37.1 °C)] 97.7 °F (36.5 °C)  Pulse:  [100-117] 104  Resp:  [16-20] 18  SpO2:  [94 %-97 %] 94 %  BP: (119-140)/(58-87) 119/70     Intake/Output - Last 3 Shifts       10/30 0700 - 10/31 0659 10/31 0700 - 11/01 0659 11/01 0700 - 11/02 0659    P.O.   250    Total Intake(mL/kg)   250 (1.3)    Net   +250           Urine Occurrence   3 x    Stool Occurrence   1 x          SpO2: (!) 94 %  O2 Device (Oxygen Therapy): room air    Physical Exam  Vitals signs reviewed.   Constitutional:       General: He is not in acute distress.     Appearance: Normal appearance. He is obese. He is not ill-appearing.   HENT:      Head: Normocephalic and atraumatic.      Right Ear: External ear normal.      Left Ear: External ear normal.      Nose: Nose normal.      Mouth/Throat:      Pharynx: Oropharynx is clear.   Eyes:      Extraocular Movements: Extraocular movements intact.      Conjunctiva/sclera: Conjunctivae normal.      Pupils: Pupils are equal, round, and reactive to light.   Neck:      Musculoskeletal: Normal range of motion. No neck  rigidity or muscular tenderness.   Cardiovascular:      Rate and Rhythm: Normal rate and regular rhythm.      Heart sounds: No murmur. No gallop.    Pulmonary:      Effort: Pulmonary effort is normal. No respiratory distress.      Breath sounds: No wheezing or rhonchi.      Comments: Left CT with SS output. No air leak on cough  Abdominal:      General: There is no distension.      Palpations: Abdomen is soft.      Tenderness: There is no abdominal tenderness. There is no guarding.   Musculoskeletal:         General: No swelling, tenderness or deformity.      Right lower leg: No edema.      Left lower leg: No edema.   Skin:     General: Skin is warm and dry.      Coloration: Skin is not jaundiced.   Neurological:      General: No focal deficit present.      Mental Status: He is alert and oriented to person, place, and time. Mental status is at baseline.      Motor: No weakness.         Significant Labs:  ABGs: No results for input(s): PH, PCO2, PO2, HCO3, POCSATURATED, BE in the last 48 hours.  Amylase: No results for input(s): AMYLASE in the last 48 hours.  BMP:   Recent Labs   Lab 10/31/20  0449         K 4.0      CO2 28   BUN 12   CREATININE 0.9   CALCIUM 8.9     Cardiac markers: No results for input(s): CKMB, CPKMB, TROPONINT, TROPONINI, MYOGLOBIN in the last 48 hours.  CBC:   Recent Labs   Lab 10/31/20  0449   WBC 7.45   RBC 4.19*   HGB 11.2*   HCT 37.8*   *   MCV 90   MCH 26.7*   MCHC 29.6*       Significant Diagnostics:  I have reviewed all pertinent imaging results/findings within the past 24 hours.    VTE Risk Mitigation (From admission, onward)         Ordered     IP VTE LOW RISK PATIENT  Once      10/23/20 2321     Place sequential compression device  Until discontinued      10/23/20 2321

## 2020-11-01 NOTE — ASSESSMENT & PLAN NOTE
Patient is a 29 yo M with obesity who presented as a transfer for complaints of SOB and fatigue and was found to have recurrent tension pneumothorax with chest tube placement. Currently with 2 (1 pigtail and 1 thoracostomy tube) chest tubes after initial one failed. Initially tension pneumothorax presumably due to his infectious cavitary lesion. Here for second opinion. Patient develops symptoms after clamped. On 10/20, CT showed large left pneumothroax with mediastinal shift to right and large volume subcutaneous emphysema in left chest wall. Subsequent CXR showed adequate expansion after Chest tube adjusment. Repeat CXR here at [facility] show's small apical left sided pneumothorax due to his cavitary lesion. Review of previous CT from [facility] and CT done this admission show no signs of cavitary lesion. Daily CXR show unchanged pneumothorax and persistent subcutaneous emphysema. Patient continues to have serosanguinous drainage without any acute changes and denies any new SOB or respiratory complaints. Patient has continued to not require oxygen and has had minimal pain requirements. Plan is for discharge with follow up with PCP. Given large body habitus plan for evaluation overnight for pneumothorax with removal of large bore chest tube on 11/1 prior to discharge.     Plan  - bronchoscopy with endobronchial valve placement POD 2  -  chest tube clamped, drain on side of bed  - Pain regimen added   - Supplemental O2 as needed  - PRN albuterol for wheezing

## 2020-11-01 NOTE — DISCHARGE SUMMARY
[facility] Hospital Medicine  Discharge Summary      Patient Name: Reggie An  MRN: [id number]  Admission Date: 10/23/2020  Hospital Length of Stay: 9 days  Discharge Date and Time:  11/01/2020 11:56 AM  Attending Physician: Elizabeth Herman MD   Discharging Provider: Ritesh Novoa MD  Primary Care Provider: Primary Doctor [facility] Medicine Team: [facility] MED 2 Ritesh Novoa MD    HPI:   Mr. An is a 30 y.o. M with morbid obesity presenting of left sided chest pain and SOB since 10/16. He was transferred here from [facility] for a second opinion. Around the week prior to 9/20, patient reported flu like symptoms such as fatigue, some nasal congestion and SOB. He said he started to feel better and his cold symptoms were improving but Around 9/20 he developed significant left sided chest pain, worse with inspiration. He went to the ER and was found to have a LLL cavitary lesion. An AFB smear was positive for TB and MRSA, but TB/MAC PCR was negative. He was discharged with augmentin for 30 days because also growing gram negative rods. About another week goes by and his left sided chest pain and SOB comes back. He goes back to [facility] ER on 10/16 and was found to have a pneumothroax likely secondary to his cavitary lesion. A chest tube was placed with improvement of symptoms. However, during his stay he had intermittent episodes of chest pain and sob and repeat imaging showed recurring pneumothorax. Another chest tube was placed and he was transferred to [facility] for a second opinion regarding recurrent pneumothorax. He states that when ever his chest tube is clamped he develops symptoms. He denies any travel history or sick contacts that he knows of. He denies fevers, chills, n/v/d or cough.            Procedure(s) (LRB):  BRONCHOSCOPY, FLEXIBLE, WITH ENDOBRONCHIAL VALVE INSERTION (N/A)      Hospital Course:   Patient was admitted for evaluation of possible cavitary lesion and is being followed by  general surgery and infectious disease for recommendations. Records from [facility] were obtained which included previous CT which showed no sign of cavitary lesion. During hospital admission patient was found to have G6PD deficiency with no signs of anemia or hemolysis during admission. Patient taken to be taken to OR on 10/28 for bronchoscopy and endobronchial valve placement. Patient was seen by thoracic surgery on 10/29 who clamped the pigtail catheter and repeated the CXR with plans to clamp the larger bore catheter and remove both chest tubes in AM. Records from [facility] show 3x negative TB PCR. Infectious disease recommends outpatient follow up in 2 weeks with no recommended antibiotic therapy at this time.On 10/30 pigtail catheter was removed and large bore catheter was clamped as well. Repeat CXR shows no change in pneumothorax. 2nd large bore chest tube was removed by thoracic surgery on 11/1/2020 with no complications.      Consults:   Consults (From admission, onward)        Status Ordering Provider     Inpatient consult to Cardiothoracic Surgery  Once     Provider:  (Not yet assigned)    Completed YOVANI OLSEN     Inpatient consult to Infectious Diseases  Once     Provider:  (Not yet assigned)    Completed YOVANI OLSEN     Inpatient consult to Midline team  Once     Provider:  (Not yet assigned)    Completed CHELE MORILLO          * Spontaneous tension pneumothorax  Patient is a 29 yo M with obesity who presented as a transfer for complaints of SOB and fatigue and was found to have recurrent tension pneumothorax with chest tube placement. Currently with 2 (1 pigtail and 1 thoracostomy tube) chest tubes after initial one failed. Initially tension pneumothorax presumably due to his infectious cavitary lesion. Here for second opinion. Patient develops symptoms after clamped. On 10/20, CT showed large left pneumothroax with mediastinal shift to right and large volume subcutaneous emphysema in left chest wall.  Subsequent CXR showed adequate expansion after Chest tube adjusment. [place] [facility] [place]'s small apical left sided pneumothorax due to his cavitary lesion. Review of previous CT from [facility] and CT done this admission show no signs of cavitary lesion. Daily CXR show unchanged pneumothorax and persistent subcutaneous emphysema. Patient continues to have serosanguinous drainage without any acute changes and denies any new SOB or respiratory complaints. Patient has continued to not require oxygen and has had minimal pain requirements. Plan is for discharge with follow up with PCP. Given large body habitus plan for evaluation overnight for pneumothorax with removal of large bore chest tube on 11/1 prior to discharge.     Plan  - bronchoscopy with endobronchial valve placement POD 2  -  chest tube clamped, drain on side of bed  - Pain regimen added   - Supplemental O2 as needed  - PRN albuterol for wheezing          Cavitary lesion of lung  Sputum cultures at [facility] MRSA+, + 1/3 AFB cultures, TB/MAC PCR negative.   9/21 at [facility], CT chest demonstrated a 5.7 x 4.4 cm left lower lobe cavitary lesion. Was initially treated at [facility] with augmentin after growing gram negative rods on cultures, however was also growing MRSA. Today he was started on PO clindamycin at [facility] today. On 10/20 CT surgery was consulted for wedge vs lobectomy, but no surgical intervention was done due to no significant blebs and likely infectious etiology. Records from [facility] showed no sign of cavitary lesion and  plans to proceed with bronchoscopy and endobronchial valve placement in AM on 10/28. Patient continues to be stable and afebrile while off antibiotics and will likely not require abx this admission. Patient will follow up with ID in 2 weeks.     Plan  - Follow up with ID outpatient in 2 weeks as well as PCP  - [facility] did draw tube drainage bacterial, fungal and AFB cultures  - Will monitor patient off abx      Secondary  spontaneous pneumothorax  See spontaneous pneumothorax      G6PD deficiency  Patient found to have G6PD level of 2.4 with normal value of 7-25. Patient was tested based on possible treatment with oxidizing agent for TB or cavitary lesions. Allergies to Sulfa placed in chart.    Plan  - Avoid oxidizing agents and monitor for signs of worsening anemia    Folate deficiency anemia  - continue PO Folate outpatient       Iron deficiency anemia  - continue PO Iron outpatient       Morbid obesity with body mass index of 50.0-59.9 in adult  - diabetic diet        Final Active Diagnoses:    Diagnosis Date Noted POA    PRINCIPAL PROBLEM:  Spontaneous tension pneumothorax [J93.0] 10/23/2020 Yes    Cavitary lesion of lung [J98.4] 10/23/2020 Yes    Secondary spontaneous pneumothorax [J93.12]  Yes    G6PD deficiency [D75.A] 10/26/2020 Yes    Iron deficiency anemia [D50.9] 10/24/2020 Yes    Folate deficiency anemia [D52.9] 10/24/2020 Yes    Morbid obesity with body mass index of 50.0-59.9 in adult [E66.01, Z68.43]  Not Applicable      Problems Resolved During this Admission:       Discharged Condition: good    Disposition:     Follow Up:  Follow-up Information     [facility] INFECTIOUS DISEASE In 2 weeks.    Specialty: Infectious Diseases  Contact information:  [address] [address]  [phone]           [facility].    Specialty: Family Medicine  Why: Hospital follow up  11/2/2020 @ 0840  Contact information:  [address], [address]  [place] [address] [phone]  Additional information:  At this time [facility] will only use these entries [facility], [facility], and Emergency Department due to COVID-19 precautions.                Patient Instructions:   No discharge procedures on file.    Significant Diagnostic Studies: Labs:   CMP   Recent Labs   Lab 10/31/20  0449      K 4.0      CO2 28      BUN 12   CREATININE 0.9   CALCIUM  8.9   PROT 7.7   ALBUMIN 2.9*   BILITOT 0.2   ALKPHOS 51*   AST 17   ALT 19   ANIONGAP 8   ESTGFRAFRICA >60.0   EGFRNONAA >60.0    and CBC   Recent Labs   Lab 10/31/20  0449   WBC 7.45   HGB 11.2*   HCT 37.8*   *     Microbiology:   Blood Culture   Lab Results   Component Value Date    LABBLOO No growth after 5 days. 10/24/2020    LABBLOO No growth after 5 days. 10/24/2020    and Sputum Culture No results found for: GSRESP, RESPIRATORYC  Radiology: X-Ray: CXR: X-Ray Chest 1 View (CXR):   Results for orders placed or performed during the hospital encounter of 10/23/20   X-Ray Chest 1 View    Narrative    EXAMINATION:  XR CHEST 1 VIEW    CLINICAL HISTORY:  chest tubes;    TECHNIQUE:  Single frontal view of the chest was performed.    COMPARISON:  October 31, 2020    FINDINGS:  Single chest view is submitted.  Left-sided chest tube again noted, as discussed on prior examinations the distal aspect of the chest tube is within the thoracic cavity however the side hole is external to the thoracic cavity overlying the soft tissues of the left chest wall and there is subcutaneous emphysema.  This configuration is similar.  Within the pleural space in which the previously identified left-sided pneumothorax was seen, there may be some residual extrapulmonary air however there does appear to be predominantly pleural fluid, overall volume of pleural fluid is likely mildly more prominent.  The left hemithorax otherwise appears stable, the cardiomediastinal silhouette and right hemithorax appears stable.      Impression    The previously identified left-sided pneumothorax noted, the degree of extrapulmonary air is diminished, with pleural fluid along the pleural space, mildly greater volume of pleural fluid however with improved peripheral expansion of the left lung, and otherwise the intrathoracic appearance is stable    Left-sided chest tube again noted, the distal tip remains intrathoracic, the side hole remains  extrathoracic, as discussed above and on prior studies.      Electronically signed by: Aston Baptiste  Date:    11/01/2020  Time:    06:20       Pending Diagnostic Studies:     None         Medications:  Reconciled Home Medications:      Medication List      START taking these medications    ferrous sulfate 325 (65 FE) MG EC tablet  Take 1 tablet (325 mg total) by mouth once daily.     folic acid-vit B6-vit B12 2.5-25-2 mg 2.5-25-2 mg Tab  Commonly known as: FOLBIC or Equiv  Take 1 tablet by mouth once daily.     oxyCODONE 5 MG immediate release tablet  Commonly known as: ROXICODONE  Take 1 tablet (5 mg total) by mouth every 4 (four) hours as needed for Pain.            Indwelling Lines/Drains at time of discharge:   Lines/Drains/Airways     Drain                 Chest Tube 1 Left  -- days         Chest Tube 2 Left Midaxillary -- days                Time spent on the discharge of patient: 40 minutes  Patient was seen and examined on the date of discharge and determined to be suitable for discharge.         Ritesh Novoa MD  Department of Hospital Medicine  [facility]

## 2020-11-01 NOTE — PLAN OF CARE
Pt is AAOx4. Pt remain free from fall and injuries. VS remain stable. Questions and concerns voiced and answered. Medication compliance. Chest tube in place. Clamped. Denies any distress. Call light in reach. Bed in low locked position. Side rails x2. Belongings at bedside.

## 2020-11-01 NOTE — PROGRESS NOTES
[facility]  Thoracic Surgery  Progress Note    Subjective:     History of Present Illness:  Patient is a 29yo morbidly obese male without any other significant medical history who [place] [facility] [place] [facility] for evaluation given recurrent left sided pneumothoraces. Patient reports prior to mid-September he was in his usual state of health. Around 9/20 he developed flu-like symptoms with fatigue, congestion and some shortness of breath. He later experienced appreciable left sided chest pain, reportedly worse with inspiration and presented to the ED where he was found to have a LLL cavitary lesion.  An AFB smear was positive for TB and MRSA, but TB/MAC PCR was negative. He was discharged with augmentin for 30 days because also growing gram negative rods. He was progressing at home until 10/16 when sharp left sided chest pain and shortness of breath prompted return to the ED at the OSH. Here, he was found to have a PTX which was thought to be 2/2 to his cavitary lesion. A chest tube was placed at that time with resolution of his symptoms; however, throughout his stay he has three recurrences of pneumothoraces requiring additional chest tubes for a total of 3 and return to suction. He reports large upper chest tube was placed most recently prior to his transfer here for second opinion. He reports his symptoms typically resolve with tube placement but recur whenever taken off suction. He denies any travel history or sick contacts that he knows of. He denies fevers, chills, n/v/d or cough. Non-smoker.     Post-Op Info:  Procedure(s) (LRB):  BRONCHOSCOPY, FLEXIBLE, WITH ENDOBRONCHIAL VALVE INSERTION (N/A)   4 Days Post-Op     Interval History: No acute events overnight. Interval CXRs improved. No desats noted.  No leaks noted today. AFVSS stable on RA.     Medications:  Continuous Infusions:  Scheduled Meds:   ferrous sulfate  325 mg Oral Daily    folic acid-vit B6-vit B12 2.5-25-2 mg  1  tablet Oral Daily    senna  8.6 mg Oral Daily     PRN Meds:acetaminophen, albuterol, dextrose 50%, dextrose 50%, erythromycin, glucagon (human recombinant), glucose, glucose, morphine, sodium chloride 0.9%, sodium chloride 0.9%     Review of patient's allergies indicates:   Allergen Reactions    Bactrim [sulfamethoxazole-trimethoprim]      G6PD     Shellfish containing products      Objective:     Vital Signs (Most Recent):  Temp: 97.7 °F (36.5 °C) (11/01/20 0105)  Pulse: 104 (11/01/20 0105)  Resp: 18 (11/01/20 0105)  BP: 119/70 (11/01/20 0105)  SpO2: (!) 94 % (11/01/20 0400) Vital Signs (24h Range):  Temp:  [97.7 °F (36.5 °C)-98.7 °F (37.1 °C)] 97.7 °F (36.5 °C)  Pulse:  [100-117] 104  Resp:  [16-20] 18  SpO2:  [94 %-97 %] 94 %  BP: (119-140)/(58-87) 119/70     Intake/Output - Last 3 Shifts       10/30 0700 - 10/31 0659 10/31 0700 - 11/01 0659 11/01 0700 - 11/02 0659    P.O.   250    Total Intake(mL/kg)   250 (1.3)    Net   +250           Urine Occurrence   3 x    Stool Occurrence   1 x          SpO2: (!) 94 %  O2 Device (Oxygen Therapy): room air    Physical Exam  Vitals signs reviewed.   Constitutional:       General: He is not in acute distress.     Appearance: Normal appearance. He is obese. He is not ill-appearing.   HENT:      Head: Normocephalic and atraumatic.      Right Ear: External ear normal.      Left Ear: External ear normal.      Nose: Nose normal.      Mouth/Throat:      Pharynx: Oropharynx is clear.   Eyes:      Extraocular Movements: Extraocular movements intact.      Conjunctiva/sclera: Conjunctivae normal.      Pupils: Pupils are equal, round, and reactive to light.   Neck:      Musculoskeletal: Normal range of motion. No neck rigidity or muscular tenderness.   Cardiovascular:      Rate and Rhythm: Normal rate and regular rhythm.      Heart sounds: No murmur. No gallop.    Pulmonary:      Effort: Pulmonary effort is normal. No respiratory distress.      Breath sounds: No wheezing or rhonchi.       Comments: Left CT with SS output. No air leak on cough  Abdominal:      General: There is no distension.      Palpations: Abdomen is soft.      Tenderness: There is no abdominal tenderness. There is no guarding.   Musculoskeletal:         General: No swelling, tenderness or deformity.      Right lower leg: No edema.      Left lower leg: No edema.   Skin:     General: Skin is warm and dry.      Coloration: Skin is not jaundiced.   Neurological:      General: No focal deficit present.      Mental Status: He is alert and oriented to person, place, and time. Mental status is at baseline.      Motor: No weakness.         Significant Labs:  ABGs: No results for input(s): PH, PCO2, PO2, HCO3, POCSATURATED, BE in the last 48 hours.  Amylase: No results for input(s): AMYLASE in the last 48 hours.  BMP:   Recent Labs   Lab 10/31/20  0449         K 4.0      CO2 28   BUN 12   CREATININE 0.9   CALCIUM 8.9     Cardiac markers: No results for input(s): CKMB, CPKMB, TROPONINT, TROPONINI, MYOGLOBIN in the last 48 hours.  CBC:   Recent Labs   Lab 10/31/20  0449   WBC 7.45   RBC 4.19*   HGB 11.2*   HCT 37.8*   *   MCV 90   MCH 26.7*   MCHC 29.6*       Significant Diagnostics:  I have reviewed all pertinent imaging results/findings within the past 24 hours.    VTE Risk Mitigation (From admission, onward)         Ordered     IP VTE LOW RISK PATIENT  Once      10/23/20 2321     Place sequential compression device  Until discontinued      10/23/20 2321              Assessment/Plan:     * Spontaneous tension pneumothorax  31yo morbidly obese male without any other significant medical history who presented to [facility] as transfer from OSH for evaluation given recurrent left sided pneumothoraces. Thoracic surgery consulted for evaluation and potential surgical intervention      - S/p bronch, endobronchial valve placement in basal segmental bronchus of LLL  - Chest tube removed this am     - Ok for discharge from  thoracic surgery standpoint  - Continue care per primary team         Moon Crane MD  Thoracic Surgery  [facility]

## 2020-11-01 NOTE — DISCHARGE INSTRUCTIONS
You were admitted for a pneumothorax or collapsed lung and had evaluation by the infectious disease team and thoracic surgery team. While you were here you did not require any antibiotics and had 2 chest tubes to help with re inflation of that L lung. You also had a procedure performed by thoracic surgery where a scope was used to examine the airways and a 1 way valve was placed to help with re inflation of that lung. You improved after this procedure and the thoracic surgery team was able to remove both chest tubes. You will need to continue routine care of your wounds and follow up with your new PCP and infectious disease in 2 weeks.

## 2020-11-01 NOTE — PLAN OF CARE
Problem: Adult Inpatient Plan of Care  Goal: Plan of Care Review  Outcome: Met  Goal: Patient-Specific Goal (Individualization)  Outcome: Met  Goal: Absence of Hospital-Acquired Illness or Injury  Outcome: Met  Goal: Optimal Comfort and Wellbeing  Outcome: Met  Goal: Readiness for Transition of Care  Outcome: Met  Goal: Rounds/Family Conference  Outcome: Met     Problem: Bariatric Environmental Safety  Goal: Safety Maintained with Care  Outcome: Met     Problem: Infection  Goal: Infection Symptom Resolution  Outcome: Met     Problem: Fall Injury Risk  Goal: Absence of Fall and Fall-Related Injury  Outcome: Met     Problem: Skin Injury Risk Increased  Goal: Skin Health and Integrity  Outcome: Met

## 2020-11-01 NOTE — PLAN OF CARE
11/01/20 0749   Post-Acute Status   Post-Acute Authorization Other   Other Status No Post-Acute Service Needs

## 2020-11-01 NOTE — ASSESSMENT & PLAN NOTE
29yo morbidly obese male without any other significant medical history who presented to [facility] as transfer from OSH for evaluation given recurrent left sided pneumothoraces. Thoracic surgery consulted for evaluation and potential surgical intervention      - S/p bronch, endobronchial valve placement in basal segmental bronchus of LLL  - Chest tube removed this am     - Ok for discharge from thoracic surgery standpoint  - Continue care per primary team

## 2020-11-02 DIAGNOSIS — J98.4 CAVITARY LUNG DISEASE: Primary | ICD-10-CM

## 2020-11-03 ENCOUNTER — PATIENT OUTREACH (OUTPATIENT)
Dept: ADMINISTRATIVE | Facility: CLINIC | Age: 30
End: 2020-11-03

## 2020-11-03 ENCOUNTER — PATIENT MESSAGE (OUTPATIENT)
Dept: CARDIOTHORACIC SURGERY | Facility: CLINIC | Age: 30
End: 2020-11-03

## 2020-11-03 DIAGNOSIS — J93.9 PNEUMOTHORAX, UNSPECIFIED TYPE: Primary | ICD-10-CM

## 2020-11-03 RX ORDER — OXYCODONE AND ACETAMINOPHEN 5; 325 MG/1; MG/1
1 TABLET ORAL EVERY 4 HOURS PRN
Qty: 21 TABLET | Refills: 0 | Status: SHIPPED | OUTPATIENT
Start: 2020-11-03 | End: 2020-11-12

## 2020-11-03 NOTE — PATIENT INSTRUCTIONS
Flexible Bronchoscopy  A flexible bronchoscopy is an exam of the airways of your lungs. A thin, flexible tube called a bronchoscope is used. It has a light and small camera that allow the healthcare provider to view your airways.    Before your test  · Follow your healthcare provider's instructions carefully. If you dont, the exam may be canceled. Or you may need to take it again.  · If you are taking blood-thinning medicine, ask your healthcare provider if you should stop taking the medicine before this test.  · Have no food or drink for at least 8 hours before the test. Also, avoid smoking for 24 hours before the test.  · You will need to remove any dentures or removable devices from your mouth.  · Right before the test, you will be given sedating medicines to help you relax. The medicine may be given by an IV (intravenously) into one of your veins. In addition, your nose and throat may be numbed with a special spray to help prevent gagging and coughing.  · If you are having this test as an outpatient, make sure you have an adult friend or family member to drive you home.  During your test  Bronchoscopy takes 45 to 60 minutes and includes the following steps:  · You may be given medicine (anesthesia) so that you are unconscious or asleep during the procedure.  · The healthcare provider inserts the tube into your nose or mouth.  · If you have not been given anesthesia, you might feel a gagging sensation. To help ease this feeling, you will be told to swallow or take deep breaths. Your airway will remain open even with the tube in place. But you wont be able to talk.  · The provider checks your breathing passage. He or she may also remove tiny tissue samples for biopsy.  After your test  · You may have a mild sore throat or cough. Your voice may also be hoarse.  · Don't eat or drink until the anesthesia wears off.  · If you had a biopsy, you might see traces of blood being coughed up.  When to call your  healthcare provider  Call your healthcare provider right away if you have any of the following:  · Shortness of breath  · Chest pain  · Bleeding from your nose or throat  · Coughing up a large amount of blood  · A fever above 100.4°F (38°C) for more than 24 hours  Call 911  Call 911 if you have:  · Chest pain  · Severe shortness of breath     © 7917-6181 [employer]. [address] [place] [address]. All rights reserved. This information is not intended as a substitute for professional medical care. Always follow your healthcare professional's instructions.

## 2020-11-10 ENCOUNTER — OFFICE VISIT (OUTPATIENT)
Dept: INFECTIOUS DISEASES | Facility: CLINIC | Age: 30
End: 2020-11-10
Payer: MEDICAID

## 2020-11-10 VITALS
HEART RATE: 117 BPM | SYSTOLIC BLOOD PRESSURE: 155 MMHG | DIASTOLIC BLOOD PRESSURE: 109 MMHG | HEIGHT: 74 IN | WEIGHT: 315 LBS | BODY MASS INDEX: 40.43 KG/M2 | TEMPERATURE: 99 F

## 2020-11-10 DIAGNOSIS — J98.4 CAVITARY LESION OF LUNG: Primary | ICD-10-CM

## 2020-11-10 PROCEDURE — 99213 OFFICE O/P EST LOW 20 MIN: CPT | Mod: S$PBB,,, | Performed by: INTERNAL MEDICINE

## 2020-11-10 PROCEDURE — 99213 OFFICE O/P EST LOW 20 MIN: CPT | Mod: PBBFAC | Performed by: INTERNAL MEDICINE

## 2020-11-10 PROCEDURE — 99999 PR PBB SHADOW E&M-EST. PATIENT-LVL III: ICD-10-PCS | Mod: PBBFAC,,, | Performed by: INTERNAL MEDICINE

## 2020-11-10 PROCEDURE — 99999 PR PBB SHADOW E&M-EST. PATIENT-LVL III: CPT | Mod: PBBFAC,,, | Performed by: INTERNAL MEDICINE

## 2020-11-10 PROCEDURE — 99213 PR OFFICE/OUTPT VISIT, EST, LEVL III, 20-29 MIN: ICD-10-PCS | Mod: S$PBB,,, | Performed by: INTERNAL MEDICINE

## 2020-11-11 NOTE — PROGRESS NOTES
Infectious Disease Clinic Note  11/10/2020      Subjective:       Patient ID: Reggie An is a 30 y.o. male being seen for an new visit.    Chief Complaint: Hospital Follow Up    HPI   Mr. An is a 29 y/o M pt who was admitted for spontaneous pneumothorax to [facility], imaging concerning for cavitary lung lesion, with hx course c/b by recurrent pneumothoraces prompting transfer to [facility] for CTS evaluation presents today for hospital follow-up. Per chart, TB testing neg x 2 at [facility]. Pt stable off antibiotics during stay at [facility]. Underwent bronchoscopy and endobronchial valve placement on 10/28. Upon evaluation of OSH imaging CTS did not find evidence of cavitary lung lesion. Repeat CT at [facility] did not reveal cavitary lung lesion either. Final chest tube was removed on 11/1 and was discharged home. Pt reports feeling well, but continuous to cough up some clear sputum and also notes wheezing that improves with albuterol. Denies fevers, chills, night sweats, malaise, bloody sputum, sob. Denies drainage/ purulence, tenderness or erythema at the site of prior chest tubes.    Family History   Problem Relation Age of Onset    Diabetes Mother     Diabetes Father      Social History     Socioeconomic History    Marital status: Single     Spouse name: Not on file    Number of children: Not on file    Years of education: Not on file    Highest education level: Not on file   Occupational History    Not on file   Social Needs    Financial resource strain: Not on file    Food insecurity     Worry: Not on file     Inability: Not on file    Transportation needs     Medical: Not on file     Non-medical: Not on file   Tobacco Use    Smoking status: Never Smoker   Substance and Sexual Activity    Alcohol use: Not Currently    Drug use: Never    Sexual activity: Not Currently   Lifestyle    Physical activity     Days per week: Not on file     Minutes per session: Not on file    Stress: Not on file   Relationships     Social connections     Talks on phone: Not on file     Gets together: Not on file     Attends [employer] service: Not on file     Active member of club or organization: Not on file     Attends meetings of clubs or organizations: Not on file     Relationship status: Not on file   Other Topics Concern    Not on file   Social History Narrative    Not on file     Past Surgical History:   Procedure Laterality Date    FLEXIBLE BRONCHOSCOPY WITH INSERTION OF ENDOBRONCHIAL VALVE N/A 10/28/2020    Procedure: BRONCHOSCOPY, FLEXIBLE, WITH ENDOBRONCHIAL VALVE INSERTION;  Surgeon: Dmitriy Benitez MD;  Location: [facility] OR [address] [place] [address];  Service: Thoracic;  Laterality: N/A;    SHOULDER ARTHROSCOPY Left        Patient's Medications   New Prescriptions    No medications on file   Previous Medications    FERROUS SULFATE 325 (65 FE) MG EC TABLET    Take 1 tablet (325 mg total) by mouth once daily.    FOLIC ACID-VIT B6-VIT B12 2.5-25-2 MG (FOLBIC OR EQUIV) 2.5-25-2 MG TAB    Take 1 tablet by mouth once daily.    OXYCODONE (ROXICODONE) 5 MG IMMEDIATE RELEASE TABLET    Take 1 tablet (5 mg total) by mouth every 4 (four) hours as needed for Pain.    OXYCODONE-ACETAMINOPHEN (PERCOCET) 5-325 MG PER TABLET    Take 1 tablet by mouth every 4 (four) hours as needed for Pain.   Modified Medications    No medications on file   Discontinued Medications    No medications on file       Patient Active Problem List    Diagnosis Date Noted    Secondary spontaneous pneumothorax     G6PD deficiency 10/26/2020    Iron deficiency anemia 10/24/2020    Folate deficiency anemia 10/24/2020    Morbid obesity with body mass index of 50.0-59.9 in adult     Cavitary lesion of lung 10/23/2020    Spontaneous tension pneumothorax 10/23/2020       Review of Systems   Review of Systems   Constitutional: Negative for chills, diaphoresis, fever, malaise/fatigue and weight loss.   HENT: Negative for congestion and sore throat.    Eyes: Negative for blurred vision  "and double vision.   Respiratory: Negative for cough and shortness of breath.    Cardiovascular: Negative for chest pain and palpitations.   Gastrointestinal: Negative for diarrhea and vomiting.   Genitourinary: Negative for dysuria and urgency.   Musculoskeletal: Negative for myalgias and neck pain.   Skin: Negative for itching and rash.   Neurological: Negative for dizziness and headaches.   Psychiatric/Behavioral: Negative for substance abuse. The patient is not nervous/anxious.    All other systems reviewed and are negative.          Objective:      BP (!) 155/109 (BP Location: Right arm)   Pulse (!) 117   Temp 98.9 °F (37.2 °C) (Oral)   Ht 6' 2" (1.88 m)   Wt (!) 201.6 kg (444 lb 7.2 oz)   BMI 57.06 kg/m²   Estimated body mass index is 57.06 kg/m² as calculated from the following:    Height as of this encounter: 6' 2" (1.88 m).    Weight as of this encounter: 201.6 kg (444 lb 7.2 oz).    Physical Exam  Vitals signs and nursing note reviewed.   Constitutional:       Appearance: Normal appearance.      Comments: obese   HENT:      Head: Normocephalic and atraumatic.      Nose: Nose normal. No congestion.      Mouth/Throat:      Mouth: Mucous membranes are dry.   Eyes:      Extraocular Movements: Extraocular movements intact.      Pupils: Pupils are equal, round, and reactive to light.   Neck:      Musculoskeletal: Normal range of motion and neck supple.   Cardiovascular:      Rate and Rhythm: Normal rate and regular rhythm.   Pulmonary:      Effort: Pulmonary effort is normal.      Comments: Wheezing present bilaterally  Abdominal:      General: Abdomen is flat. Bowel sounds are normal.      Palpations: Abdomen is soft.   Musculoskeletal: Normal range of motion.         General: No swelling or tenderness.   Skin:     Comments: Clean dressings in place over prior chest tube sites.   Neurological:      General: No focal deficit present.      Mental Status: He is alert and oriented to person, place, and time. " "  Psychiatric:         Mood and Affect: Mood normal.         Behavior: Behavior normal.         Assessment:         1. Cavitary lesion of lung           Plan:       Reggie was seen today for hospital follow up.    Diagnoses and all orders for this visit:    Cavitary lesion of lung  --5.7 x 4.4 cm left lower lobe cavitary lesion on CT at [facility] , but NOT present on recent imaging from 10/24 or on review of OSH images by CTS service  --per primary team's review of [facility] records pt with negative "TB screens were negative x 3"  --feeling well after discharge on 11/1  --f/u with CTS  --advised to call ID clinic if febrile or having worsening cough      Pt seen and discussed with Dr. Osborn.    Lupe Burgess PGY-4  Infectious Disease                  "

## 2020-11-12 ENCOUNTER — OFFICE VISIT (OUTPATIENT)
Dept: FAMILY MEDICINE | Facility: HOSPITAL | Age: 30
End: 2020-11-12
Payer: MEDICAID

## 2020-11-12 VITALS
DIASTOLIC BLOOD PRESSURE: 97 MMHG | HEIGHT: 74 IN | SYSTOLIC BLOOD PRESSURE: 152 MMHG | BODY MASS INDEX: 40.43 KG/M2 | HEART RATE: 107 BPM | WEIGHT: 315 LBS

## 2020-11-12 DIAGNOSIS — D75.A G6PD DEFICIENCY: ICD-10-CM

## 2020-11-12 DIAGNOSIS — J98.4 CAVITARY LESION OF LUNG: ICD-10-CM

## 2020-11-12 DIAGNOSIS — J93.0 SPONTANEOUS TENSION PNEUMOTHORAX: Primary | ICD-10-CM

## 2020-11-12 DIAGNOSIS — E66.01 MORBID OBESITY WITH BODY MASS INDEX OF 50.0-59.9 IN ADULT: ICD-10-CM

## 2020-11-12 DIAGNOSIS — D50.8 OTHER IRON DEFICIENCY ANEMIA: ICD-10-CM

## 2020-11-12 PROCEDURE — 99213 OFFICE O/P EST LOW 20 MIN: CPT | Performed by: PHYSICIAN ASSISTANT

## 2020-11-12 RX ORDER — FERROUS SULFATE 325(65) MG
325 TABLET, DELAYED RELEASE (ENTERIC COATED) ORAL DAILY
Qty: 90 TABLET | Refills: 3 | Status: SHIPPED | OUTPATIENT
Start: 2020-11-12

## 2020-11-12 NOTE — PROGRESS NOTES
FAMILY MEDICINE  New Visit Progress Note   Recent Hospital Discharge     PRESENTING HISTORY     Chief Complaint/Reason for Admission:  Follow up Hospital Discharge   Chief Complaint   Patient presents with    Hospital Follow Up     PCP: Primary Doctor No    History of Present Illness:  Mr. Reggie An is a 30 y.o. male who was recently admitted to the hospital.    Admission Date: 10/23/2020  Hospital Length of Stay: 9 days  Discharge Date and Time:  11/01/2020 11:56 AM  ___________________________________________________________________    Today:  Patient is a 30-year-old male with morbid obesity who presents today for hospital follow up after being admitted for spontaneous pneumothorax s/p chest tube placement x3. He was transferred from [facility] to [facility]. CT surgery placed endobronchial valve and performed bronchoscopy on 10/28. ID on board due to cavitary lesion seen on CT (since resolved). Patient tested neg for TB x3 at [facility]. ID recommended no further abx and out patient follow up. Of note, in mid September an AFB smear was positive for TB and MRSA, but TB/MAC PCR was negative and he was treated with one month of Augmentin for GNRs. He was also found to G6PD deficiency while admitted.    Today the patient is unaccompanied during the visit today. He has been doing well since discharge from the hospital. He is still having occasional shortness of breath, but has been steadily improving. Also having some wheezing and mildly productive cough that is improving. Using albuterol PRN with some relief, staying hydrated. Had follow up with ID earlier this week. They continued to recommend no further abx at this time and follow up PRN if fever, which he denies. Also denies continued chest pain. He has follow up with CTS, Dr. Benitez, scheduled next week. Also has repeat CT next week.     G6PD:  Diagnosed while admitted. Taking Fe supplement daily. Denies any constipation at this time. Has not gotten the  folic acid-B-complex, as it is $85. Denies fever, dark urine, or abdominal/back pain.     Mr. An is also waiting for clearance from [facility] to begin exercise program. He is highly motivated to lose weight at this time.     Review of Systems  General ROS: negative for chills, fever or weight loss  Psychological ROS: negative for hallucination, depression or suicidal ideation  Ophthalmic ROS: negative for blurry vision, photophobia or eye pain  ENT ROS: negative for epistaxis, sore throat or rhinorrhea  Respiratory ROS: +wheezing  Cardiovascular ROS: no chest pain or dyspnea on exertion  Gastrointestinal ROS: no abdominal pain, change in bowel habits, or black/ bloody stools  Genito-Urinary ROS: no dysuria, trouble voiding, or hematuria  Musculoskeletal ROS: negative for gait disturbance or muscular weakness  Neurological ROS: no syncope or seizures; no ataxia  Dermatological ROS: negative for pruritis, rash and jaundice    PAST HISTORY:     Past Medical History:   Diagnosis Date    Childhood asthma     G6PD deficiency     Pneumothorax     spontaneous       Past Surgical History:   Procedure Laterality Date    FLEXIBLE BRONCHOSCOPY WITH INSERTION OF ENDOBRONCHIAL VALVE N/A 10/28/2020    Procedure: BRONCHOSCOPY, FLEXIBLE, WITH ENDOBRONCHIAL VALVE INSERTION;  Surgeon: Dmitriy Benitez MD;  Location: [facility] OR [address] [place] [address];  Service: Thoracic;  Laterality: N/A;    SHOULDER ARTHROSCOPY Left        Family History   Problem Relation Age of Onset    Diabetes Mother     Diabetes Father        Social History     Socioeconomic History    Marital status: Single     Spouse name: Not on file    Number of children: Not on file    Years of education: Not on file    Highest education level: Not on file   Occupational History    Not on file   Social Needs    Financial resource strain: Not on file    Food insecurity     Worry: Not on file     Inability: Not on file    Transportation needs     Medical: Not on file      Non-medical: Not on file   Tobacco Use    Smoking status: Never Smoker   Substance and Sexual Activity    Alcohol use: Not Currently    Drug use: Never    Sexual activity: Not Currently   Lifestyle    Physical activity     Days per week: Not on file     Minutes per session: Not on file    Stress: Not on file   Relationships    Social connections     Talks on phone: Not on file     Gets together: Not on file     Attends [employer] service: Not on file     Active member of club or organization: Not on file     Attends meetings of clubs or organizations: Not on file     Relationship status: Not on file   Other Topics Concern    Not on file   Social History Narrative    Not on file       MEDICATIONS & ALLERGIES:     Current Outpatient Medications on File Prior to Visit   Medication Sig Dispense Refill    folic acid-vit B6-vit B12 2.5-25-2 mg (FOLBIC OR EQUIV) 2.5-25-2 mg Tab Take 1 tablet by mouth once daily. 90 tablet 0    [DISCONTINUED] ferrous sulfate 325 (65 FE) MG EC tablet Take 1 tablet (325 mg total) by mouth once daily. 90 tablet 0    [DISCONTINUED] oxyCODONE (ROXICODONE) 5 MG immediate release tablet Take 1 tablet (5 mg total) by mouth every 4 (four) hours as needed for Pain. (Patient not taking: Reported on 11/12/2020) 7 tablet 0    [DISCONTINUED] oxyCODONE-acetaminophen (PERCOCET) 5-325 mg per tablet Take 1 tablet by mouth every 4 (four) hours as needed for Pain. (Patient not taking: Reported on 11/12/2020) 21 tablet 0     No current facility-administered medications on file prior to visit.         Review of patient's allergies indicates:   Allergen Reactions    Asa [aspirin]     Bactrim [sulfamethoxazole-trimethoprim]      G6PD     Shellfish containing products        OBJECTIVE:     Vital Signs:  Vitals:    11/12/20 0905   BP: (!) 152/97   Pulse: 107     Wt Readings from Last 1 Encounters:   11/12/20 0905 (!) 201.2 kg (443 lb 9.1 oz)     Body mass index is 56.95 kg/m².        Physical  "Exam:  BP (!) 152/97   Pulse 107   Ht 6' 2" (1.88 m)   Wt (!) 201.2 kg (443 lb 9.1 oz)   BMI 56.95 kg/m²   General appearance: Alert, cooperative, no distress  Constitutional: Oriented to person, place, and time. +morbidly obese  HEENT: Normocephalic, atraumatic, neck symmetrical, no nasal discharge   Eyes: Conjunctivae/corneas clear, EOM's intact  Lungs: +wheezing to mid and upper fields bilaterally L>R; +3 healing chest tube insertion sites to left chest wall  Heart: Regular rate and rhythm without rub  Abdomen: Soft, non-tender; bowel sounds normoactive  Extremities: extremities symmetric; no edema  Integument: Skin color, texture, turgor normal  Neurologic: Alert and oriented X 3, normal strength, normal coordination and gait  Psychiatric: no pressured speech; normal affect; no evidence of impaired cognition     Laboratory  Lab Results   Component Value Date    WBC 7.45 10/31/2020    HGB 11.2 (L) 10/31/2020    HCT 37.8 (L) 10/31/2020    MCV 90 10/31/2020     (H) 10/31/2020     BMP  Lab Results   Component Value Date     10/31/2020    K 4.0 10/31/2020     10/31/2020    CO2 28 10/31/2020    BUN 12 10/31/2020    CREATININE 0.9 10/31/2020    CALCIUM 8.9 10/31/2020    ANIONGAP 8 10/31/2020    ESTGFRAFRICA >60.0 10/31/2020    EGFRNONAA >60.0 10/31/2020     Lab Results   Component Value Date    ALT 19 10/31/2020    AST 17 10/31/2020    ALKPHOS 51 (L) 10/31/2020    BILITOT 0.2 10/31/2020     Lab Results   Component Value Date    INR 1.0 10/24/2020    INR 4.0 (H) 10/24/2020     Lab Results   Component Value Date    HGBA1C 4.4 10/24/2020     No results for input(s): POCTGLUCOSE in the last 72 hours.    Diagnostic Results:    CT Chest w/o  Impression:     Interval expansion of a now large left pneumothorax with slight rightward mediastinal shift consistent with left-sided tension pneumothorax.     Extensive left chest wall subcutaneous emphysema and moderate pneumomediastinum.     Left pleural drain " and catheter appear appropriately positioned.     This report was flagged in [facility] as abnormal.    CXR:  Impression:     Left-sided pneumothorax appears stable in size and configuration without evidence for significant interval detrimental change, the intrathoracic appearance is otherwise stable.     Left-sided chest tube again noted, the distal tip is within the thoracic cavity, however the side port remains external to the thoracic cavity overlying the soft tissues of the extra thoracic chest wall, as previously discussed.    ASSESSMENT & PLAN:     HIGH RISK CONDITION(S):  Patient has a condition that poses threat to life and bodily function: Spontaneous Pneumothorax    Spontaneous tension pneumothorax   -Chest tube removed prior to d/c. Doing well at this time. ID recommended no further abx treatment. CT surg follow up next week with repeat CT. Will eventually remove endobronchial valve.    Cavitary lesion of lung   -Resolved. Seen on previous CT, but not on most recent. Repeat CT next week.     G6PD deficiency   -New diagnosis. Stable. Can decrease Fe supplementation to every other day to help prevent constipation. Will start OTC folic acid and B-complex.     Other iron deficiency anemia   -Decrease to every other day. Repeat labs at one month follow up.  -     ferrous sulfate 325 (65 FE) MG EC tablet; Take 1 tablet (325 mg total) by mouth once daily.  Dispense: 90 tablet; Refill: 3    Morbid obesity with body mass index of 50.0-59.9 in adult   -Once cleared from CT surgery he would like to start exercise plan, specifically biking. Could consider bariatric surgery referral at [facility] if interested.     Scheduled Follow-up :  Future Appointments   Date Time Provider Department Center   11/16/2020  2:30 PM [facility] BCC CT1 [facility] CT DIGNA Ryan Cintron   11/16/2020  3:15 PM Dmitriy Benitez MD [facility] THORAC Davis Saida   12/14/2020  1:20 PM [facility]       Post Visit Medication List:      Medication List          Accurate as of November 12, 2020 10:42 AM. If you have any questions, ask your nurse or doctor.            CONTINUE taking these medications    ferrous sulfate 325 (65 FE) MG EC tablet  Take 1 tablet (325 mg total) by mouth once daily.     folic acid-vit B6-vit B12 2.5-25-2 mg 2.5-25-2 mg Tab  Commonly known as: FOLBIC or Equiv  Take 1 tablet by mouth once daily.           Where to Get Your Medications      These medications were sent to [facility]/pharmacy #[id number] [place], [place] - [address] [address]    Phone: [phone]   · ferrous sulfate 325 (65 FE) MG EC tablet         Signing Provider:  Renny Sosa PA-C

## 2020-11-16 ENCOUNTER — HOSPITAL ENCOUNTER (OUTPATIENT)
Dept: RADIOLOGY | Facility: HOSPITAL | Age: 30
Discharge: HOME | End: 2020-11-16
Attending: PHYSICIAN ASSISTANT
Payer: MEDICAID

## 2020-11-16 ENCOUNTER — OFFICE VISIT (OUTPATIENT)
Dept: CARDIOTHORACIC SURGERY | Facility: CLINIC | Age: 30
End: 2020-11-16
Payer: MEDICAID

## 2020-11-16 ENCOUNTER — INFUSION (OUTPATIENT)
Dept: INFECTIOUS DISEASES | Facility: HOSPITAL | Age: 30
End: 2020-11-16
Attending: INTERNAL MEDICINE
Payer: MEDICAID

## 2020-11-16 VITALS
HEART RATE: 116 BPM | HEIGHT: 74 IN | OXYGEN SATURATION: 97 % | WEIGHT: 315 LBS | BODY MASS INDEX: 40.43 KG/M2 | DIASTOLIC BLOOD PRESSURE: 94 MMHG | SYSTOLIC BLOOD PRESSURE: 142 MMHG

## 2020-11-16 DIAGNOSIS — J98.4 CAVITARY LUNG DISEASE: ICD-10-CM

## 2020-11-16 DIAGNOSIS — Z87.09 HISTORY OF PNEUMOTHORAX: Primary | ICD-10-CM

## 2020-11-16 PROCEDURE — 99214 OFFICE O/P EST MOD 30 MIN: CPT | Mod: PBBFAC,25 | Performed by: THORACIC SURGERY (CARDIOTHORACIC VASCULAR SURGERY)

## 2020-11-16 PROCEDURE — 71260 CT THORAX DX C+: CPT | Mod: TC

## 2020-11-16 PROCEDURE — 99213 OFFICE O/P EST LOW 20 MIN: CPT | Mod: S$PBB,,, | Performed by: THORACIC SURGERY (CARDIOTHORACIC VASCULAR SURGERY)

## 2020-11-16 PROCEDURE — 99999 PR PBB SHADOW E&M-EST. PATIENT-LVL IV: CPT | Mod: PBBFAC,,, | Performed by: THORACIC SURGERY (CARDIOTHORACIC VASCULAR SURGERY)

## 2020-11-16 PROCEDURE — 25500020 PHARM REV CODE 255: Performed by: PHYSICIAN ASSISTANT

## 2020-11-16 PROCEDURE — 71260 CT THORAX DX C+: CPT | Mod: 26,,, | Performed by: RADIOLOGY

## 2020-11-16 PROCEDURE — 71260 CT CHEST WITH CONTRAST: ICD-10-PCS | Mod: 26,,, | Performed by: RADIOLOGY

## 2020-11-16 PROCEDURE — 99213 PR OFFICE/OUTPT VISIT, EST, LEVL III, 20-29 MIN: ICD-10-PCS | Mod: S$PBB,,, | Performed by: THORACIC SURGERY (CARDIOTHORACIC VASCULAR SURGERY)

## 2020-11-16 PROCEDURE — 99999 PR PBB SHADOW E&M-EST. PATIENT-LVL IV: ICD-10-PCS | Mod: PBBFAC,,, | Performed by: THORACIC SURGERY (CARDIOTHORACIC VASCULAR SURGERY)

## 2020-11-16 RX ADMIN — IOHEXOL 75 ML: 350 INJECTION, SOLUTION INTRAVENOUS at 04:11

## 2020-11-16 NOTE — PROGRESS NOTES
Patient arrived to infusion suite for IV placement. 20 G to right hand on 2nd attempt.  Patient tolerated well and left in NAD to CT.

## 2020-11-16 NOTE — PROGRESS NOTES
"Subjective:       Patient ID: Reggie An is a 30 y.o. male.    Chief Complaint: No chief complaint on file.    Diagnosis:  Spontaneous Left Pneumothorax with Persistent Air Leak     Procedure(s) and date(s):10/29/20- Flexible Bronchoscopy with Insertion of Bronchial Valve    HPI   30 year old obese male returns for follow up s/p hospitalization for left pneumothorax with persistent air leak and possible cavitary lung lesion. Left lung parenchyma was never able to be fully evaluated due to pneumothorax. In September an AFB smear was positive for TB and MRSA, but TB/MAC PCR was negative and he was treated with one month of Augmentin for GNRs. He was also found to G6PD deficiency while admitted. Returns today after EBV placement with new chest CT.    Review of Systems   Respiratory: Negative for apnea, cough, choking, chest tightness, shortness of breath, wheezing and stridor.    Cardiovascular: Negative for chest pain and palpitations.   All other systems reviewed and are negative.        Objective:       Vitals:    11/16/20 1608   BP: (!) 142/94   Pulse: (!) 116   SpO2: 97%   Weight: (!) 200.8 kg (442 lb 10.9 oz)   Height: 6' 2" (1.88 m)   PainSc: 0-No pain       Physical Exam  Constitutional:       General: He is not in acute distress.     Appearance: Normal appearance. He is obese. He is not ill-appearing, toxic-appearing or diaphoretic.   HENT:      Head: Normocephalic and atraumatic.   Cardiovascular:      Rate and Rhythm: Regular rhythm. Tachycardia present.   Pulmonary:      Effort: Pulmonary effort is normal. No respiratory distress.      Breath sounds: No stridor.   Chest:      Chest wall: No tenderness.   Musculoskeletal: Normal range of motion.   Skin:     General: Skin is warm and dry.   Neurological:      General: No focal deficit present.      Mental Status: He is alert and oriented to person, place, and time.   Psychiatric:         Mood and Affect: Mood normal.         Behavior: Behavior normal.       "   Thought Content: Thought content normal.         Judgment: Judgment normal.           The following imaging studies were personally reviewed today:    11/16/20- Chest CT with and without contrast:  1.  Poor enhancement of intravascular intra-abdominal structures probably due to breath hold technique and 75 mL aliquot of contrast medium injected via the right upper extremity.  Please see technical note above for recommendations for future contrast enhanced chest CT if clinically warranted.  2.  At an outside hospital the patient was diagnosed with cavitary lesion of the left lower lobe and subsequent pneumothorax; patient was subsequently transferred to this institution for management of pneumothorax.  No outside study is available for review; should outside chest CT or chest radiographs become available please arrange to have the radiology film library low them into the patient's film record on PACS.  I would be pleased to review them at the request of the clinical service.  3.  All devices have been removed.  Today's study reveals a small left hydropneumothorax largely dependent.  There is subsegmental consolidation in basal segments of the left lower lobe compatible with atelectasis and most conspicuous in the posterior basal segment of the left lower lobe.  I do not identify a cavity at this location or elsewhere in the left lung.  There is no bullous disease.  There is no interstitial lung disease or honeycombing.  4.  There is no right pulmonary or pleural disease.    Assessment:       30 year old obese male returns for follow up s/p hospitalization for left pneumothorax with persistent air leak requiring EBV placement and possible cavitary lung lesion.     Plan:       CT shows no obvious evidence of residual pathology to explain the patient's clinical course.  Will plan for bronchoscopy with valve removal in 2 weeks.

## 2020-11-30 ENCOUNTER — LAB VISIT (OUTPATIENT)
Dept: FAMILY MEDICINE | Facility: CLINIC | Age: 30
End: 2020-11-30
Payer: MEDICAID

## 2020-11-30 DIAGNOSIS — Z87.09 HISTORY OF PNEUMOTHORAX: ICD-10-CM

## 2020-11-30 LAB — SARS-COV-2 RNA RESP QL NAA+PROBE: NOT DETECTED

## 2020-11-30 PROCEDURE — U0003 INFECTIOUS AGENT DETECTION BY NUCLEIC ACID (DNA OR RNA); SEVERE ACUTE RESPIRATORY SYNDROME CORONAVIRUS 2 (SARS-COV-2) (CORONAVIRUS DISEASE [COVID-19]), AMPLIFIED PROBE TECHNIQUE, MAKING USE OF HIGH THROUGHPUT TECHNOLOGIES AS DESCRIBED BY CMS-2020-01-R: HCPCS

## 2020-12-01 NOTE — PRE-PROCEDURE INSTRUCTIONS
PREOP INSTRUCTIONS:    NO SOLID FOOD, MILK OR MILK PRODUCTS 8 HOURS PRIOR TO SX START TIME. 2400  YOU MAY ONLY HAVE SIPS OF WATER WITH MORNING MEDICATIONS (1) HOUR PRIOR TO ARRIVAL TO HOSPITAL.       Instructed to follow the surgeon's instructions if they differ from these.Shower instructions as well as directions to the Surgery Center were given.Encouraged to wear loose fitting,comfortable clothing.Medication instructions for pm prior to and am of procedure reviewed.Instructed to avoid taking vitamins,supplements,aspirin and ibuprofen the morning of surgery. Patient's questions and concerns addressed .Patient informed of the current visitor policy and advised patient that one visitor may accompany each patient into the hospital and wait (socially distanced) until a member of the medical team provides an update at the conclusion of the procedure.When they enter the hospital both patient and visitor will have their temperature checked.All visitors are asked to arrive with a mask and to keep their mask on throughout the visit.      Patient denies any side effects or issues with anesthesia or sedation.      Patient does not know arrival time.Explained that this information comes from the surgeon's office and if they haven't heard from them by 2 or 3 pm to call the office.Patient stated an understanding.     MOTHER - DENIS PAULINO WILL BE PROVIDING TRANSPORTATION HOME UPON DISCHARGE.

## 2020-12-02 ENCOUNTER — ANESTHESIA (OUTPATIENT)
Dept: SURGERY | Facility: HOSPITAL | Age: 30
End: 2020-12-02
Payer: MEDICAID

## 2020-12-02 ENCOUNTER — HOSPITAL ENCOUNTER (OUTPATIENT)
Facility: HOSPITAL | Age: 30
Discharge: HOME | End: 2020-12-02
Attending: THORACIC SURGERY (CARDIOTHORACIC VASCULAR SURGERY) | Admitting: THORACIC SURGERY (CARDIOTHORACIC VASCULAR SURGERY)
Payer: MEDICAID

## 2020-12-02 ENCOUNTER — ANESTHESIA EVENT (OUTPATIENT)
Dept: SURGERY | Facility: HOSPITAL | Age: 30
End: 2020-12-02
Payer: MEDICAID

## 2020-12-02 VITALS
RESPIRATION RATE: 18 BRPM | BODY MASS INDEX: 40.43 KG/M2 | SYSTOLIC BLOOD PRESSURE: 134 MMHG | HEIGHT: 74 IN | DIASTOLIC BLOOD PRESSURE: 72 MMHG | WEIGHT: 315 LBS | TEMPERATURE: 98 F | HEART RATE: 82 BPM | OXYGEN SATURATION: 95 %

## 2020-12-02 LAB
GRAM STN SPEC: NORMAL
GRAM STN SPEC: NORMAL

## 2020-12-02 PROCEDURE — 36000706: Performed by: THORACIC SURGERY (CARDIOTHORACIC VASCULAR SURGERY)

## 2020-12-02 PROCEDURE — 31624 DX BRONCHOSCOPE/LAVAGE: CPT | Mod: 51,,, | Performed by: THORACIC SURGERY (CARDIOTHORACIC VASCULAR SURGERY)

## 2020-12-02 PROCEDURE — 31648 BRONCHIAL VALVE REMOV INIT: CPT | Mod: ,,, | Performed by: THORACIC SURGERY (CARDIOTHORACIC VASCULAR SURGERY)

## 2020-12-02 PROCEDURE — 87077 CULTURE AEROBIC IDENTIFY: CPT

## 2020-12-02 PROCEDURE — D9220A PRA ANESTHESIA: Mod: CRNA,,, | Performed by: NURSE ANESTHETIST, CERTIFIED REGISTERED

## 2020-12-02 PROCEDURE — 25000003 PHARM REV CODE 250: Performed by: NURSE ANESTHETIST, CERTIFIED REGISTERED

## 2020-12-02 PROCEDURE — 63600175 PHARM REV CODE 636 W HCPCS: Performed by: NURSE ANESTHETIST, CERTIFIED REGISTERED

## 2020-12-02 PROCEDURE — D9220A PRA ANESTHESIA: ICD-10-PCS | Mod: ANES,,, | Performed by: ANESTHESIOLOGY

## 2020-12-02 PROCEDURE — 87015 SPECIMEN INFECT AGNT CONCNTJ: CPT

## 2020-12-02 PROCEDURE — 87070 CULTURE OTHR SPECIMN AEROBIC: CPT

## 2020-12-02 PROCEDURE — 31624 PR BRONCHOSCOPY,DIAG2STIC W LAVAGE: ICD-10-PCS | Mod: 51,,, | Performed by: THORACIC SURGERY (CARDIOTHORACIC VASCULAR SURGERY)

## 2020-12-02 PROCEDURE — 27201423 OPTIME MED/SURG SUP & DEVICES STERILE SUPPLY: Performed by: THORACIC SURGERY (CARDIOTHORACIC VASCULAR SURGERY)

## 2020-12-02 PROCEDURE — 71000044 HC DOSC ROUTINE RECOVERY FIRST HOUR: Performed by: THORACIC SURGERY (CARDIOTHORACIC VASCULAR SURGERY)

## 2020-12-02 PROCEDURE — 87205 SMEAR GRAM STAIN: CPT

## 2020-12-02 PROCEDURE — 87102 FUNGUS ISOLATION CULTURE: CPT

## 2020-12-02 PROCEDURE — 00520 ANES CLOSED CHEST PX NOS: CPT | Performed by: THORACIC SURGERY (CARDIOTHORACIC VASCULAR SURGERY)

## 2020-12-02 PROCEDURE — 87116 MYCOBACTERIA CULTURE: CPT

## 2020-12-02 PROCEDURE — 31648 PR BRONCHOSCOPY W REMOVE BRONCHIAL VALVE, INITIAL: ICD-10-PCS | Mod: ,,, | Performed by: THORACIC SURGERY (CARDIOTHORACIC VASCULAR SURGERY)

## 2020-12-02 PROCEDURE — 37000009 HC ANESTHESIA EA ADD 15 MINS: Performed by: THORACIC SURGERY (CARDIOTHORACIC VASCULAR SURGERY)

## 2020-12-02 PROCEDURE — 71000015 HC POSTOP RECOV 1ST HR: Performed by: THORACIC SURGERY (CARDIOTHORACIC VASCULAR SURGERY)

## 2020-12-02 PROCEDURE — 87075 CULTR BACTERIA EXCEPT BLOOD: CPT

## 2020-12-02 PROCEDURE — D9220A PRA ANESTHESIA: ICD-10-PCS | Mod: CRNA,,, | Performed by: NURSE ANESTHETIST, CERTIFIED REGISTERED

## 2020-12-02 PROCEDURE — 87186 SC STD MICRODIL/AGAR DIL: CPT

## 2020-12-02 PROCEDURE — 37000008 HC ANESTHESIA 1ST 15 MINUTES: Performed by: THORACIC SURGERY (CARDIOTHORACIC VASCULAR SURGERY)

## 2020-12-02 PROCEDURE — 87206 SMEAR FLUORESCENT/ACID STAI: CPT

## 2020-12-02 PROCEDURE — C1769 GUIDE WIRE: HCPCS | Performed by: THORACIC SURGERY (CARDIOTHORACIC VASCULAR SURGERY)

## 2020-12-02 PROCEDURE — 36000707: Performed by: THORACIC SURGERY (CARDIOTHORACIC VASCULAR SURGERY)

## 2020-12-02 PROCEDURE — D9220A PRA ANESTHESIA: Mod: ANES,,, | Performed by: ANESTHESIOLOGY

## 2020-12-02 RX ORDER — ROCURONIUM BROMIDE 10 MG/ML
INJECTION, SOLUTION INTRAVENOUS
Status: DISCONTINUED | OUTPATIENT
Start: 2020-12-02 | End: 2020-12-02

## 2020-12-02 RX ORDER — LIDOCAINE HYDROCHLORIDE 20 MG/ML
INJECTION, SOLUTION EPIDURAL; INFILTRATION; INTRACAUDAL; PERINEURAL
Status: DISCONTINUED | OUTPATIENT
Start: 2020-12-02 | End: 2020-12-02

## 2020-12-02 RX ORDER — MIDAZOLAM HYDROCHLORIDE 1 MG/ML
INJECTION, SOLUTION INTRAMUSCULAR; INTRAVENOUS
Status: DISCONTINUED | OUTPATIENT
Start: 2020-12-02 | End: 2020-12-02

## 2020-12-02 RX ORDER — FENTANYL CITRATE 50 UG/ML
25 INJECTION, SOLUTION INTRAMUSCULAR; INTRAVENOUS EVERY 5 MIN PRN
Status: DISCONTINUED | OUTPATIENT
Start: 2020-12-02 | End: 2020-12-02 | Stop reason: HOSPADM

## 2020-12-02 RX ORDER — ONDANSETRON 2 MG/ML
4 INJECTION INTRAMUSCULAR; INTRAVENOUS DAILY PRN
Status: DISCONTINUED | OUTPATIENT
Start: 2020-12-02 | End: 2020-12-02 | Stop reason: HOSPADM

## 2020-12-02 RX ORDER — ONDANSETRON 2 MG/ML
INJECTION INTRAMUSCULAR; INTRAVENOUS
Status: DISCONTINUED | OUTPATIENT
Start: 2020-12-02 | End: 2020-12-02

## 2020-12-02 RX ORDER — DEXAMETHASONE SODIUM PHOSPHATE 4 MG/ML
INJECTION, SOLUTION INTRA-ARTICULAR; INTRALESIONAL; INTRAMUSCULAR; INTRAVENOUS; SOFT TISSUE
Status: DISCONTINUED | OUTPATIENT
Start: 2020-12-02 | End: 2020-12-02

## 2020-12-02 RX ORDER — PROPOFOL 10 MG/ML
VIAL (ML) INTRAVENOUS
Status: DISCONTINUED | OUTPATIENT
Start: 2020-12-02 | End: 2020-12-02

## 2020-12-02 RX ORDER — SUCCINYLCHOLINE CHLORIDE 20 MG/ML
INJECTION INTRAMUSCULAR; INTRAVENOUS
Status: DISCONTINUED | OUTPATIENT
Start: 2020-12-02 | End: 2020-12-02

## 2020-12-02 RX ORDER — SODIUM CHLORIDE 9 MG/ML
INJECTION, SOLUTION INTRAVENOUS CONTINUOUS PRN
Status: DISCONTINUED | OUTPATIENT
Start: 2020-12-02 | End: 2020-12-02

## 2020-12-02 RX ORDER — FENTANYL CITRATE 50 UG/ML
INJECTION, SOLUTION INTRAMUSCULAR; INTRAVENOUS
Status: DISCONTINUED | OUTPATIENT
Start: 2020-12-02 | End: 2020-12-02

## 2020-12-02 RX ADMIN — PROPOFOL 200 MG: 10 INJECTION, EMULSION INTRAVENOUS at 08:12

## 2020-12-02 RX ADMIN — GLYCOPYRROLATE 0.2 MG: 0.2 INJECTION INTRAMUSCULAR; INTRAVENOUS at 07:12

## 2020-12-02 RX ADMIN — MIDAZOLAM 2 MG: 1 INJECTION INTRAMUSCULAR; INTRAVENOUS at 07:12

## 2020-12-02 RX ADMIN — ROCURONIUM BROMIDE 10 MG: 10 INJECTION, SOLUTION INTRAVENOUS at 08:12

## 2020-12-02 RX ADMIN — SUCCINYLCHOLINE CHLORIDE 200 MG: 20 INJECTION, SOLUTION INTRAMUSCULAR; INTRAVENOUS; PARENTERAL at 08:12

## 2020-12-02 RX ADMIN — ONDANSETRON 4 MG: 2 INJECTION INTRAMUSCULAR; INTRAVENOUS at 08:12

## 2020-12-02 RX ADMIN — SODIUM CHLORIDE: 0.9 INJECTION, SOLUTION INTRAVENOUS at 07:12

## 2020-12-02 RX ADMIN — FENTANYL CITRATE 50 MCG: 50 INJECTION INTRAMUSCULAR; INTRAVENOUS at 07:12

## 2020-12-02 RX ADMIN — DEXAMETHASONE SODIUM PHOSPHATE 4 MG: 4 INJECTION, SOLUTION INTRA-ARTICULAR; INTRALESIONAL; INTRAMUSCULAR; INTRAVENOUS; SOFT TISSUE at 08:12

## 2020-12-02 RX ADMIN — LIDOCAINE HYDROCHLORIDE 100 MG: 20 INJECTION, SOLUTION EPIDURAL; INFILTRATION; INTRACAUDAL at 08:12

## 2020-12-02 NOTE — PLAN OF CARE
Call to  DESTINY Guardado who states xray looks good and pt. May be dc'd home. D/C instructions given to pt and family. Pt. Tolerating po fluids and denies pain and n/v at this time. IV dc'd. VSS. Family at  for d/c. All consents and AVS in chart at time of discharge.

## 2020-12-02 NOTE — ANESTHESIA PREPROCEDURE EVALUATION
12/02/2020  Reggie An is a 30 y.o., male.  Pre-operative evaluation for Procedure(s) (LRB):  BRONCHOSCOPY, WITH BIOPSY (N/A)    Reggie An is a 30 y.o. male       Patient Active Problem List   Diagnosis    Cavitary lesion of lung    Spontaneous tension pneumothorax    Morbid obesity with body mass index of 50.0-59.9 in adult    Iron deficiency anemia    Folate deficiency anemia    G6PD deficiency    Secondary spontaneous pneumothorax       Past Surgical History:   Procedure Laterality Date    FLEXIBLE BRONCHOSCOPY WITH INSERTION OF ENDOBRONCHIAL VALVE N/A 10/28/2020    Procedure: BRONCHOSCOPY, FLEXIBLE, WITH ENDOBRONCHIAL VALVE INSERTION;  Surgeon: Dmitriy Benitez MD;  Location: [facility] OR [address] [place] [address];  Service: Thoracic;  Laterality: N/A;    SHOULDER ARTHROSCOPY Left        Social History     Socioeconomic History    Marital status: Single     Spouse name: Not on file    Number of children: Not on file    Years of education: Not on file    Highest education level: Not on file   Occupational History    Not on file   Social Needs    Financial resource strain: Not on file    Food insecurity     Worry: Not on file     Inability: Not on file    Transportation needs     Medical: Not on file     Non-medical: Not on file   Tobacco Use    Smoking status: Never Smoker   Substance and Sexual Activity    Alcohol use: Not Currently    Drug use: Never    Sexual activity: Not Currently   Lifestyle    Physical activity     Days per week: Not on file     Minutes per session: Not on file    Stress: Not on file   Relationships    Social connections     Talks on phone: Not on file     Gets together: Not on file     Attends [employer] service: Not on file     Active member of club or organization: Not on file     Attends meetings of clubs or organizations: Not on file     Relationship  status: Not on file   Other Topics Concern    Not on file   Social History Narrative    Not on file       No current facility-administered medications on file prior to encounter.      Current Outpatient Medications on File Prior to Encounter   Medication Sig Dispense Refill    ferrous sulfate 325 (65 FE) MG EC tablet Take 1 tablet (325 mg total) by mouth once daily. 90 tablet 3    folic acid-vit B6-vit B12 2.5-25-2 mg (FOLBIC OR EQUIV) 2.5-25-2 mg Tab Take 1 tablet by mouth once daily. 90 tablet 0       Review of patient's allergies indicates:   Allergen Reactions    Asa [aspirin]     Bactrim [sulfamethoxazole-trimethoprim]      G6PD     Shellfish containing products          CBC: No results for input(s): WBC, RBC, HGB, HCT, PLT, MCV, MCH, MCHC in the last 72 hours.    CMP: No results for input(s): NA, K, CL, CO2, BUN, CREATININE, GLU, MG, PHOS, CALCIUM, ALBUMIN, PROT, ALKPHOS, ALT, AST, BILITOT in the last 72 hours.    INR  No results for input(s): PT, INR, PROTIME, APTT in the last 72 hours.      Diagnostic Studies:    EKG:   No results found for this or any previous visit.    TTE:  Results for orders placed or performed during the hospital encounter of 10/23/20   Echo Color Flow Doppler? Yes   Result Value Ref Range    BSA 3.22 m2    Narrative    · This was very limited study with only few images obtained. No   measurements were obtained.  · Patient was tachycardic during the exam  · Biventricular function appears normal.  · IVC indicates normal RA pressure        No results found for: EF   No results found for this or any previous visit.    SALVADOR:  No results found for this or any previous visit.    Stress Test:  No results found for this or any previous visit.     LHC:  No results found for this or any previous visit.     PFT:  No results found for: FEV1, FVC, TXL4RYM, TLC, DLCO     ALLERGIES:     Review of patient's allergies indicates:   Allergen Reactions    Asa [aspirin]     Bactrim  [sulfamethoxazole-trimethoprim]      G6PD     Shellfish containing products      LDA:      Lines/Drains/Airways     Drain                 Chest Tube 1 Left  -- days         Chest Tube 2 Left Midaxillary -- days          Peripheral Intravenous Line                 Peripheral IV - Single Lumen 10/26/20 1100 20 G;1 3/4 in Left Forearm 36 days               Anesthesia Evaluation      Airway   Mallampati: II  TM distance: Normal, at least 6 cm  Neck ROM: Normal ROM  Dental      Pulmonary    (+) asthma,   Cardiovascular     Rate: Normal    Neuro/Psych      GI/Hepatic/Renal      Endo/Other    Abdominal                       Anesthesia Evaluation    I have reviewed the Patient Summary Reports.    I have reviewed the Nursing Notes. I have reviewed the NPO Status.   I have reviewed the Medications.     Review of Systems  Anesthesia Hx:  No problems with previous Anesthesia  Denies Family Hx of Anesthesia complications.   Denies Personal Hx of Anesthesia complications.   Cardiovascular:  Cardiovascular Normal     Pulmonary:   Asthma , spon PTX   Renal/:  Renal/ Normal     Hepatic/GI:  Hepatic/GI Normal    Neurological:  Neurology Normal    Endocrine:  Endocrine Normal        Physical Exam  General:  Morbid Obesity    Airway/Jaw/Neck:  Airway Findings: Mouth Opening: Normal Tongue: Normal  General Airway Assessment: Adult, Good  Mallampati: II  TM Distance: Normal, at least 6 cm  Jaw/Neck Findings:  Neck ROM: Normal ROM       Chest/Lungs:  Chest/Lungs Findings: Clear to auscultation     Heart/Vascular:  Heart Findings: Rate: Normal  Rhythm: Regular Rhythm        Mental Status:  Mental Status Findings:  Alert and Oriented, Cooperative         Anesthesia Plan  Type of Anesthesia, risks & benefits discussed:  Anesthesia Type:  general  Patient's Preference:   Intra-op Monitoring Plan: standard ASA monitors  Intra-op Monitoring Plan Comments:   Post Op Pain Control Plan: per primary service following discharge from PACU  Post  Op Pain Control Plan Comments:   Induction:   IV  Beta Blocker:  Patient is not currently on a Beta-Blocker (No further documentation required).       Informed Consent: Patient understands risks and agrees with Anesthesia plan.  Questions answered. Anesthesia consent signed with patient.  ASA Score: 3     Day of Surgery Review of History & Physical:    H&P update referred to the surgeon.         Ready For Surgery From Anesthesia Perspective.

## 2020-12-02 NOTE — ANESTHESIA POSTPROCEDURE EVALUATION
Anesthesia Post Evaluation    Patient: Reggie An    Procedure(s) Performed: Procedure(s) (LRB):  BRONCHOSCOPY, WITH BIOPSY (N/A)    Final Anesthesia Type: general    Patient location during evaluation: PACU  Patient participation: Yes- Able to Participate  Level of consciousness: awake and alert  Post-procedure vital signs: reviewed and stable  Pain management: adequate  Airway patency: patent    PONV status at discharge: No PONV  Anesthetic complications: no      Cardiovascular status: hemodynamically stable  Respiratory status: spontaneous ventilation  Follow-up not needed.          Vitals Value Taken Time   /71 12/02/20 0917   Temp 36.6 °C (97.9 °F) 12/02/20 0830   Pulse 96 12/02/20 0924   Resp 15 12/02/20 0923   SpO2 97 % 12/02/20 0924   Vitals shown include unvalidated device data.      No case tracking events are documented in the log.      Pain/Avery Score: Avery Score: 9 (12/2/2020  8:30 AM)

## 2020-12-02 NOTE — INTERVAL H&P NOTE
The patient has been examined and the H&P has been reviewed:    I concur with the findings and no changes have occurred since H&P was written.    Surgery risks, benefits and alternative options discussed and understood by patient/family.          Active Hospital Problems    Diagnosis  POA    Persistent air leak [J93.82]  Yes      Resolved Hospital Problems   No resolved problems to display.

## 2020-12-02 NOTE — ANESTHESIA PROCEDURE NOTES
Intubation  Performed by: Eloy Stover Jr. CRNA  Authorized by: Bell Cardona MD     Intubation:     Induction:  Rapid sequence induction    Intubated:  Postinduction    Mask Ventilation:  Easy with oral airway    Attempts:  1    Attempted By:  CRNA    Method of Intubation:  Direct    Blade:  Edwin 4    Laryngeal View Grade: Grade I - full view of chords      Difficult Airway Encountered?: No      Complications:  None    Airway Device:  Oral endotracheal tube    Airway Device Size:  9.0    Style/Cuff Inflation:  Cuffed (inflated to minimal occlusive pressure)    Placement Verified By:  Capnometry    Complicating Factors:  None    Findings Post-Intubation:  BS equal bilateral and atraumatic/condition of teeth unchanged  Notes:      Ramped used beneath pt

## 2020-12-04 DIAGNOSIS — J15.211: Primary | ICD-10-CM

## 2020-12-04 LAB — BACTERIA SPEC AEROBE CULT: ABNORMAL

## 2020-12-04 RX ORDER — AMOXICILLIN AND CLAVULANATE POTASSIUM 875; 125 MG/1; MG/1
1 TABLET, FILM COATED ORAL EVERY 12 HOURS
Qty: 14 TABLET | Refills: 0 | Status: SHIPPED | OUTPATIENT
Start: 2020-12-04

## 2020-12-07 LAB — BACTERIA SPEC ANAEROBE CULT: NORMAL

## 2020-12-07 NOTE — OP NOTE
Date of Procedure: 12/2/2020    Pre-operative Diagnosis: Indwelling Bronchial Valve after Spontaneous Pneumothorax    Post-operative Diagnosis: Same    Procedure(s): 1. Flexible Bronchoscopy with Removal of Foreign Body (Bronchial Valve).  2. Flexible Bronchoscopy with Bronchoalveolar Lavage.    Surgeon: Dmitriy Benitez MD    Assistant(s): Anton Styles MD    Anesthesia: GETA    Findings: Bronchial valve removed intact.  Moderate mucopurulent secretions in LLL    Estimated Blood Loss: Minimal    Specimen(s): None    Complications: None    Indications for Procedure: 31 yo male who had a bronchial valve placed to treat a recurrent spontaneous pneumothorax.  It was decided to proceed with elective bronchial valve removal.  Risks, benefits and possible outcomes were discussed in detail with the patient, and he and his family were given the opportunity to ask questions and have those questions answered to their satisfaction.  he desires to proceed and signed consent.    Procedure in Detail: The patient was taken to the operating room and placed supine on the operating table.  Adequate general anesthesia was achieved.  Time-out was performed.  Flexible bronchoscope was passed through the endotracheal tube and the entire airway examined to the segmental level.  Ying was sharp.  The right side was normal.  The bronchial valve was identified in the lower lobe.  It was grasped with a biopsy forceps and removed intact.  Scope was reinserted.  There was a moderate amount of mucopurulent secretions in the lower lobe bronchus.  Bronchoalveolar lavage was performed in the left lower lobe by instilling sterile saline and suctioning the effluent into a Lukens trap.  Hemostasis was excellent.  Scope was removed.  The patient tolerated the procedure well.  There were no immediate complications.  He was extubated in the operating room.    Disposition: PACU in stable condition, then home when criteria are met

## 2020-12-09 ENCOUNTER — PATIENT MESSAGE (OUTPATIENT)
Dept: CARDIOTHORACIC SURGERY | Facility: CLINIC | Age: 30
End: 2020-12-09

## 2020-12-09 DIAGNOSIS — R06.02 SHORTNESS OF BREATH: ICD-10-CM

## 2020-12-10 ENCOUNTER — LAB VISIT (OUTPATIENT)
Dept: INTERNAL MEDICINE | Facility: CLINIC | Age: 30
End: 2020-12-10
Payer: MEDICAID

## 2020-12-10 DIAGNOSIS — R06.02 SHORTNESS OF BREATH: ICD-10-CM

## 2020-12-10 PROCEDURE — U0003 INFECTIOUS AGENT DETECTION BY NUCLEIC ACID (DNA OR RNA); SEVERE ACUTE RESPIRATORY SYNDROME CORONAVIRUS 2 (SARS-COV-2) (CORONAVIRUS DISEASE [COVID-19]), AMPLIFIED PROBE TECHNIQUE, MAKING USE OF HIGH THROUGHPUT TECHNOLOGIES AS DESCRIBED BY CMS-2020-01-R: HCPCS

## 2020-12-11 LAB — SARS-COV-2 RNA RESP QL NAA+PROBE: NOT DETECTED

## 2020-12-14 ENCOUNTER — OFFICE VISIT (OUTPATIENT)
Dept: FAMILY MEDICINE | Facility: HOSPITAL | Age: 30
End: 2020-12-14
Attending: FAMILY MEDICINE
Payer: MEDICAID

## 2020-12-14 ENCOUNTER — LAB VISIT (OUTPATIENT)
Dept: LAB | Facility: HOSPITAL | Age: 30
End: 2020-12-14
Attending: FAMILY MEDICINE
Payer: MEDICAID

## 2020-12-14 VITALS
WEIGHT: 315 LBS | BODY MASS INDEX: 40.43 KG/M2 | HEIGHT: 74 IN | DIASTOLIC BLOOD PRESSURE: 83 MMHG | HEART RATE: 105 BPM | SYSTOLIC BLOOD PRESSURE: 144 MMHG

## 2020-12-14 DIAGNOSIS — D50.8 OTHER IRON DEFICIENCY ANEMIA: Primary | ICD-10-CM

## 2020-12-14 DIAGNOSIS — Z00.00 ENCOUNTER FOR MEDICAL EXAMINATION TO ESTABLISH CARE: ICD-10-CM

## 2020-12-14 DIAGNOSIS — E66.01 MORBID OBESITY WITH BODY MASS INDEX OF 50.0-59.9 IN ADULT: ICD-10-CM

## 2020-12-14 DIAGNOSIS — D75.A G6PD DEFICIENCY: ICD-10-CM

## 2020-12-14 DIAGNOSIS — D52.8 OTHER FOLATE DEFICIENCY ANEMIAS: ICD-10-CM

## 2020-12-14 LAB
ALBUMIN SERPL BCP-MCNC: 3.3 G/DL (ref 3.5–5.2)
ALP SERPL-CCNC: 72 U/L (ref 55–135)
ALT SERPL W/O P-5'-P-CCNC: 16 U/L (ref 10–44)
ANION GAP SERPL CALC-SCNC: 6 MMOL/L (ref 8–16)
AST SERPL-CCNC: 19 U/L (ref 10–40)
BASOPHILS # BLD AUTO: 0.03 K/UL (ref 0–0.2)
BASOPHILS NFR BLD: 0.4 % (ref 0–1.9)
BILIRUB SERPL-MCNC: 0.3 MG/DL (ref 0.1–1)
BUN SERPL-MCNC: 11 MG/DL (ref 6–20)
CALCIUM SERPL-MCNC: 8.6 MG/DL (ref 8.7–10.5)
CHLORIDE SERPL-SCNC: 103 MMOL/L (ref 95–110)
CHOLEST SERPL-MCNC: 208 MG/DL (ref 120–199)
CHOLEST/HDLC SERPL: 5.3 {RATIO} (ref 2–5)
CO2 SERPL-SCNC: 29 MMOL/L (ref 23–29)
CREAT SERPL-MCNC: 0.8 MG/DL (ref 0.5–1.4)
DIFFERENTIAL METHOD: ABNORMAL
EOSINOPHIL # BLD AUTO: 0.2 K/UL (ref 0–0.5)
EOSINOPHIL NFR BLD: 2.5 % (ref 0–8)
ERYTHROCYTE [DISTWIDTH] IN BLOOD BY AUTOMATED COUNT: 14.1 % (ref 11.5–14.5)
EST. GFR  (AFRICAN AMERICAN): >60 ML/MIN/1.73 M^2
EST. GFR  (NON AFRICAN AMERICAN): >60 ML/MIN/1.73 M^2
GLUCOSE SERPL-MCNC: 94 MG/DL (ref 70–110)
HCT VFR BLD AUTO: 38.7 % (ref 40–54)
HDLC SERPL-MCNC: 39 MG/DL (ref 40–75)
HDLC SERPL: 18.8 % (ref 20–50)
HGB BLD-MCNC: 11.5 G/DL (ref 14–18)
IMM GRANULOCYTES # BLD AUTO: 0.01 K/UL (ref 0–0.04)
IMM GRANULOCYTES NFR BLD AUTO: 0.1 % (ref 0–0.5)
LDLC SERPL CALC-MCNC: 153.8 MG/DL (ref 63–159)
LYMPHOCYTES # BLD AUTO: 2.6 K/UL (ref 1–4.8)
LYMPHOCYTES NFR BLD: 37.3 % (ref 18–48)
MCH RBC QN AUTO: 26.4 PG (ref 27–31)
MCHC RBC AUTO-ENTMCNC: 29.7 G/DL (ref 32–36)
MCV RBC AUTO: 89 FL (ref 82–98)
MONOCYTES # BLD AUTO: 0.6 K/UL (ref 0.3–1)
MONOCYTES NFR BLD: 8 % (ref 4–15)
NEUTROPHILS # BLD AUTO: 3.5 K/UL (ref 1.8–7.7)
NEUTROPHILS NFR BLD: 51.7 % (ref 38–73)
NONHDLC SERPL-MCNC: 169 MG/DL
NRBC BLD-RTO: 0 /100 WBC
PLATELET # BLD AUTO: 324 K/UL (ref 150–350)
PMV BLD AUTO: 11.1 FL (ref 9.2–12.9)
POTASSIUM SERPL-SCNC: 3.6 MMOL/L (ref 3.5–5.1)
PROT SERPL-MCNC: 7.7 G/DL (ref 6–8.4)
RBC # BLD AUTO: 4.35 M/UL (ref 4.6–6.2)
SODIUM SERPL-SCNC: 138 MMOL/L (ref 136–145)
TRIGL SERPL-MCNC: 76 MG/DL (ref 30–150)
TSH SERPL DL<=0.005 MIU/L-ACNC: 0.92 UIU/ML (ref 0.4–4)
WBC # BLD AUTO: 6.86 K/UL (ref 3.9–12.7)

## 2020-12-14 PROCEDURE — 80053 COMPREHEN METABOLIC PANEL: CPT

## 2020-12-14 PROCEDURE — 85025 COMPLETE CBC W/AUTO DIFF WBC: CPT

## 2020-12-14 PROCEDURE — 84443 ASSAY THYROID STIM HORMONE: CPT

## 2020-12-14 PROCEDURE — 80061 LIPID PANEL: CPT

## 2020-12-14 PROCEDURE — 36415 COLL VENOUS BLD VENIPUNCTURE: CPT

## 2020-12-14 PROCEDURE — 99213 OFFICE O/P EST LOW 20 MIN: CPT | Performed by: STUDENT IN AN ORGANIZED HEALTH CARE EDUCATION/TRAINING PROGRAM

## 2020-12-14 NOTE — PROGRESS NOTES
[facility]  History & Physical    SUBJECTIVE:     Chief Complaint:   Chief Complaint   Patient presents with    Follow-up       History of Present Illness:  30 y.o. male who  has a past medical history of Childhood asthma, G6PD deficiency, and Pneumothorax. presents to clinic today for establishment of care.     G6PD Deficiency: He has longstanding history of G6PD deficiency. He states that is well-aware of the triggers and denies any current dark urine, jaundice, yellowing of skin/sclera, fatigue. He takes folate and iron daily. Denies dizziness, HA, thinning of hair.     Recurrent PTX: Has history of recurrent pneumothoraces 2/2 lung infection September 2020 (x3; 10/14/20, 10/20/20, and 10/23/20) s/p valve placement (October 2020) and removal (12/2/20). He denies any shortness of breath, CP, pain with inspiration/expiration.     Morbid obesity: He has a plan for losing weight. He states he's lost weight before, up to 120lbs before.      Allergies:  Review of patient's allergies indicates:   Allergen Reactions    Asa [aspirin]     Bactrim [sulfamethoxazole-trimethoprim]      G6PD     Shellfish containing products        Home Medications:  Current Outpatient Medications on File Prior to Visit   Medication Sig    ferrous sulfate 325 (65 FE) MG EC tablet Take 1 tablet (325 mg total) by mouth once daily.    folic acid-vit B6-vit B12 2.5-25-2 mg (FOLBIC OR EQUIV) 2.5-25-2 mg Tab Take 1 tablet by mouth once daily.    amoxicillin-clavulanate 875-125mg (AUGMENTIN) 875-125 mg per tablet Take 1 tablet by mouth every 12 (twelve) hours. (Patient not taking: Reported on 12/14/2020)     No current facility-administered medications on file prior to visit.        Past Medical History:   Diagnosis Date    Childhood asthma     G6PD deficiency     Pneumothorax     spontaneous     Past Surgical History:   Procedure Laterality Date    BRONCHOSCOPY WITH BIOPSY N/A 12/2/2020    Procedure: BRONCHOSCOPY, WITH BIOPSY;   Surgeon: Dmitriy Benitez MD;  [facility];  Service: Thoracic;  Laterality: N/A;  Endobronchial valve removal    FLEXIBLE BRONCHOSCOPY WITH INSERTION OF ENDOBRONCHIAL VALVE N/A 10/28/2020    Procedure: BRONCHOSCOPY, FLEXIBLE, WITH ENDOBRONCHIAL VALVE INSERTION;  Surgeon: Dmitriy Benitez MD;  [facility];  Service: Thoracic;  Laterality: N/A;    SHOULDER ARTHROSCOPY Left      Family History   Problem Relation Age of Onset    Diabetes Mother     Diabetes Father      Social History     Tobacco Use    Smoking status: Never Smoker   Substance Use Topics    Alcohol use: Not Currently     Frequency: Monthly or less     Drinks per session: 1 or 2     Binge frequency: Never    Drug use: Never        Review of Systems   Constitutional: Negative for chills, fever and malaise/fatigue.   HENT: Negative for hearing loss and tinnitus.    Eyes: Negative for blurred vision.   Respiratory: Negative for cough, shortness of breath and wheezing.    Cardiovascular: Negative for chest pain, palpitations and leg swelling.   Gastrointestinal: Negative for abdominal pain, constipation, diarrhea, heartburn, nausea and vomiting.   Genitourinary: Negative for dysuria and hematuria.   Musculoskeletal: Negative for back pain, joint pain, myalgias and neck pain.   Neurological: Negative for dizziness, tingling, sensory change, weakness and headaches.   Endo/Heme/Allergies: Negative for polydipsia. Does not bruise/bleed easily.        OBJECTIVE:     Vital Signs:  Pulse: 105 (12/14/20 1337)  BP: (!) 144/83 (12/14/20 1337)  /83,      Physical Exam  Constitutional:       General: He is not in acute distress.     Appearance: Normal appearance. He is obese. He is not ill-appearing, toxic-appearing or diaphoretic.   HENT:      Head: Normocephalic and atraumatic.      Right Ear: External ear normal.      Left Ear: External ear normal.      Mouth/Throat:      Mouth: Mucous membranes are moist.   Eyes:       General: No scleral icterus.     Extraocular Movements: Extraocular movements intact.   Neck:      Musculoskeletal: Normal range of motion.   Cardiovascular:      Rate and Rhythm: Normal rate and regular rhythm.   Pulmonary:      Effort: Pulmonary effort is normal. No respiratory distress.   Abdominal:      Tenderness: There is no guarding.   Musculoskeletal: Normal range of motion.         General: No swelling.   Skin:     General: Skin is warm and dry.   Neurological:      Mental Status: He is alert and oriented to person, place, and time.   Psychiatric:         Mood and Affect: Mood normal.         Behavior: Behavior normal.         Laboratory:  Hemoglobin A1C   Date Value Ref Range Status   10/24/2020 4.4 4.0 - 5.6 % Final     Comment:     ADA Screening Guidelines:  5.7-6.4%  Consistent with prediabetes  >or=6.5%  Consistent with diabetes  High levels of fetal hemoglobin interfere with the HbA1C  assay. Heterozygous hemoglobin variants (HbS, HgC, etc)do  not significantly interfere with this assay.   However, presence of multiple variants may affect accuracy.       Thyroid Labs Latest Ref Rng & Units 10/27/2020 10/29/2020 10/31/2020   TSH 0.400 - 4.000 uIU/mL - - -   Sodium 136 - 145 mmol/L 133(L) 134(L) 136   Potassium 3.5 - 5.1 mmol/L 4.7 4.1 4.0   Chloride 95 - 110 mmol/L 101 100 100   Carbon Dioxide 23 - 29 mmol/L 19(L) 25 28   Glucose 70 - 110 mg/dL 82 89 103   Blood Urea Nitrogen 6 - 20 mg/dL 10 10 12   Creatinine 0.5 - 1.4 mg/dL 0.8 0.8 0.9   Calcium 8.7 - 10.5 mg/dL 9.0 9.9 8.9   Total Protein 6.0 - 8.4 g/dL 8.1 8.6(H) 7.7   Albumin 3.5 - 5.2 g/dL 3.1(L) 3.4(L) 2.9(L)   Total Bilirubin 0.1 - 1.0 mg/dL 0.4 0.3 0.2   AST 10 - 40 U/L 27 19 17   ALT 10 - 44 U/L 18 21 19   Anion Gap 8 - 16 mmol/L 13 9 8   eGFR (African American) >60 mL/min/1.73 m:2 >60.0 >60.0 >60.0   eGFR (Non-African American) >60 mL/min/1.73 m:2 >60.0 >60.0 >60.0   WBC 3.90 - 12.70 K/uL 9.52 9.92 7.45   RBC 4.60 - 6.20 M/uL 4.26(L)  4.40(L) 4.19(L)   Hemoglobin 14.0 - 18.0 g/dL 11.8(L) 11.7(L) 11.2(L)   Hematocrit 40.0 - 54.0 % 36.9(L) 38.5(L) 37.8(L)   MCV 82 - 98 fL 87 88 90   MCH 27.0 - 31.0 pg 27.7 26.6(L) 26.7(L)   MCHC 32.0 - 36.0 g/dL 32.0 30.4(L) 29.6(L)   RDW 11.5 - 14.5 % 14.3 14.1 14.1   Platelets 150 - 350 K/uL 377(H) 485(H) 498(H)   MPV 9.2 - 12.9 fL 11.1 10.6 10.0   Gran # 1.8 - 7.7 K/uL 5.3 5.9 3.6   Lymph # 1.0 - 4.8 K/uL 2.5 2.5 2.5   Mono # 0.3 - 1.0 K/uL 1.2(H) 1.0 0.9   Eos # 0.0 - 0.5 K/uL 0.4 0.4 0.5   Baso # 0.00 - 0.20 K/uL 0.04 0.06 0.05   Gran % 38.0 - 73.0 % 55.8 59.1 48.2   Lymph % 18.0 - 48.0 % 26.1 25.4 33.0   Mono% 4.0 - 15.0 % 12.5 10.2 11.5   Eos % 0.0 - 8.0 % 4.4 4.0 6.3   Baso % 0.0 - 1.9 % 0.4 0.6 0.7   Prothrombin Time 9.0 - 12.5 sec - - -   INR 0.8 - 1.2 - - -   aPTT 21.0 - 32.0 sec - - -       Diagnostic Results:      A/P:  Reggie was seen today for follow-up.    Diagnoses and all orders for this visit:    Other iron deficiency anemia    G6PD deficiency    Morbid obesity with body mass index of 50.0-59.9 in adult  -     TSH; Future    Other folate deficiency anemias    Encounter for medical examination to establish care  -     Comprehensive Metabolic Panel; Future  -     CBC Auto Differential; Future  -     Lipid Panel; Future        Follow up in about 3 months (around 3/14/2021).    - Will closely monitor patient's BP Status and consider medication management at next visit  - Encourage patient to seek immediate medical attention if he begins noticing dark urine, yellow sclera, jaundice, or increased/worsening fatigue   - Encourage patient to check his BP qWeekly and keep a log to bring in at next visit    Edmund Rocha DO  [facility], PGY-1  12/14/2020

## 2021-01-04 LAB — FUNGUS SPEC CULT: NORMAL

## 2021-01-08 NOTE — TRANSFER OF CARE
"Anesthesia Transfer of Care Note    Patient: Reggie An    Procedure(s) Performed: Procedure(s) (LRB):  BRONCHOSCOPY, WITH BIOPSY (N/A)    Patient location: [facility]    Anesthesia Type: general    Transport from OR: Transported from OR on 6-10 L/min O2 by face mask with adequate spontaneous ventilation    Post pain: adequate analgesia    Post assessment: no apparent anesthetic complications and tolerated procedure well    Post vital signs: stable    Level of consciousness: awake    Nausea/Vomiting: no nausea/vomiting    Complications: none    Transfer of care protocol was followed      Last vitals:   Visit Vitals  BP (!) 155/86 (BP Location: Left arm, Patient Position: Lying)   Pulse 100   Temp 36.6 °C (97.8 °F) (Oral)   Resp 19   Ht 6' 2" (1.88 m)   Wt (!) 200.9 kg (443 lb)   SpO2 96%   BMI 56.88 kg/m²     "
0

## 2021-02-03 LAB
ACID FAST MOD KINY STN SPEC: NORMAL
MYCOBACTERIUM SPEC QL CULT: NORMAL

## 2021-03-01 ENCOUNTER — PATIENT MESSAGE (OUTPATIENT)
Dept: INFECTIOUS DISEASES | Facility: CLINIC | Age: 31
End: 2021-03-01

## 2021-04-16 ENCOUNTER — PATIENT MESSAGE (OUTPATIENT)
Dept: RESEARCH | Facility: HOSPITAL | Age: 31
End: 2021-04-16

## 2021-05-13 ENCOUNTER — HOSPITAL ENCOUNTER (EMERGENCY)
Facility: HOSPITAL | Age: 31
Discharge: HOME | End: 2021-05-13
Attending: EMERGENCY MEDICINE
Payer: MEDICAID

## 2021-05-13 VITALS
TEMPERATURE: 99 F | WEIGHT: 315 LBS | SYSTOLIC BLOOD PRESSURE: 149 MMHG | OXYGEN SATURATION: 97 % | BODY MASS INDEX: 40.43 KG/M2 | RESPIRATION RATE: 20 BRPM | DIASTOLIC BLOOD PRESSURE: 78 MMHG | HEIGHT: 74 IN | HEART RATE: 90 BPM

## 2021-05-13 DIAGNOSIS — R14.0 ABDOMINAL DISTENSION: ICD-10-CM

## 2021-05-13 DIAGNOSIS — R10.9 ABDOMINAL PAIN, UNSPECIFIED ABDOMINAL LOCATION: Primary | ICD-10-CM

## 2021-05-13 DIAGNOSIS — U07.1 COVID-19: ICD-10-CM

## 2021-05-13 LAB
ALBUMIN SERPL BCP-MCNC: 3.7 G/DL (ref 3.5–5.2)
ALP SERPL-CCNC: 62 U/L (ref 55–135)
ALT SERPL W/O P-5'-P-CCNC: 37 U/L (ref 10–44)
ANION GAP SERPL CALC-SCNC: 14 MMOL/L (ref 8–16)
ANISOCYTOSIS BLD QL SMEAR: SLIGHT
AST SERPL-CCNC: 47 U/L (ref 10–40)
BASOPHILS # BLD AUTO: 0.02 K/UL (ref 0–0.2)
BASOPHILS NFR BLD: 0.5 % (ref 0–1.9)
BILIRUB SERPL-MCNC: 0.8 MG/DL (ref 0.1–1)
BILIRUB UR QL STRIP: NEGATIVE
BUN SERPL-MCNC: 8 MG/DL (ref 6–20)
CALCIUM SERPL-MCNC: 8.4 MG/DL (ref 8.7–10.5)
CHLORIDE SERPL-SCNC: 99 MMOL/L (ref 95–110)
CLARITY UR: CLEAR
CO2 SERPL-SCNC: 23 MMOL/L (ref 23–29)
COLOR UR: YELLOW
CREAT SERPL-MCNC: 0.9 MG/DL (ref 0.5–1.4)
CTP QC/QA: YES
DIFFERENTIAL METHOD: ABNORMAL
EOSINOPHIL # BLD AUTO: 0 K/UL (ref 0–0.5)
EOSINOPHIL NFR BLD: 0.7 % (ref 0–8)
ERYTHROCYTE [DISTWIDTH] IN BLOOD BY AUTOMATED COUNT: 12.2 % (ref 11.5–14.5)
EST. GFR  (AFRICAN AMERICAN): >60 ML/MIN/1.73 M^2
EST. GFR  (NON AFRICAN AMERICAN): >60 ML/MIN/1.73 M^2
GLUCOSE SERPL-MCNC: 87 MG/DL (ref 70–110)
GLUCOSE UR QL STRIP: NEGATIVE
HCT VFR BLD AUTO: 40.5 % (ref 40–54)
HGB BLD-MCNC: 13 G/DL (ref 14–18)
HGB UR QL STRIP: NEGATIVE
IMM GRANULOCYTES # BLD AUTO: 0.01 K/UL (ref 0–0.04)
IMM GRANULOCYTES NFR BLD AUTO: 0.2 % (ref 0–0.5)
KETONES UR QL STRIP: NEGATIVE
LEUKOCYTE ESTERASE UR QL STRIP: NEGATIVE
LIPASE SERPL-CCNC: 32 U/L (ref 4–60)
LYMPHOCYTES # BLD AUTO: 1.9 K/UL (ref 1–4.8)
LYMPHOCYTES NFR BLD: 44.1 % (ref 18–48)
MCH RBC QN AUTO: 28.2 PG (ref 27–31)
MCHC RBC AUTO-ENTMCNC: 32.1 G/DL (ref 32–36)
MCV RBC AUTO: 88 FL (ref 82–98)
MONOCYTES # BLD AUTO: 0.5 K/UL (ref 0.3–1)
MONOCYTES NFR BLD: 10.5 % (ref 4–15)
NEUTROPHILS # BLD AUTO: 1.9 K/UL (ref 1.8–7.7)
NEUTROPHILS NFR BLD: 44 % (ref 38–73)
NITRITE UR QL STRIP: NEGATIVE
NRBC BLD-RTO: 0 /100 WBC
PH UR STRIP: 6 [PH] (ref 5–8)
PLATELET # BLD AUTO: 207 K/UL (ref 150–450)
PLATELET BLD QL SMEAR: ABNORMAL
PMV BLD AUTO: 10.7 FL (ref 9.2–12.9)
POTASSIUM SERPL-SCNC: 3.5 MMOL/L (ref 3.5–5.1)
PROT SERPL-MCNC: 8.7 G/DL (ref 6–8.4)
PROT UR QL STRIP: NEGATIVE
RBC # BLD AUTO: 4.61 M/UL (ref 4.6–6.2)
SARS-COV-2 RDRP RESP QL NAA+PROBE: POSITIVE
SODIUM SERPL-SCNC: 136 MMOL/L (ref 136–145)
SP GR UR STRIP: 1.01 (ref 1–1.03)
URN SPEC COLLECT METH UR: NORMAL
UROBILINOGEN UR STRIP-ACNC: NEGATIVE EU/DL
WBC # BLD AUTO: 4.29 K/UL (ref 3.9–12.7)

## 2021-05-13 PROCEDURE — U0002 COVID-19 LAB TEST NON-CDC: HCPCS | Performed by: PHYSICIAN ASSISTANT

## 2021-05-13 PROCEDURE — 80053 COMPREHEN METABOLIC PANEL: CPT | Performed by: PHYSICIAN ASSISTANT

## 2021-05-13 PROCEDURE — 99284 EMERGENCY DEPT VISIT MOD MDM: CPT | Mod: 25

## 2021-05-13 PROCEDURE — 81003 URINALYSIS AUTO W/O SCOPE: CPT | Performed by: PHYSICIAN ASSISTANT

## 2021-05-13 PROCEDURE — 85025 COMPLETE CBC W/AUTO DIFF WBC: CPT | Performed by: PHYSICIAN ASSISTANT

## 2021-05-13 PROCEDURE — 25000003 PHARM REV CODE 250: Performed by: PHYSICIAN ASSISTANT

## 2021-05-13 PROCEDURE — 83690 ASSAY OF LIPASE: CPT | Performed by: PHYSICIAN ASSISTANT

## 2021-05-13 RX ORDER — ACETAMINOPHEN 325 MG/1
650 TABLET ORAL
Status: COMPLETED | OUTPATIENT
Start: 2021-05-13 | End: 2021-05-13

## 2021-05-13 RX ORDER — BENZONATATE 100 MG/1
100 CAPSULE ORAL 3 TIMES DAILY PRN
Qty: 15 CAPSULE | Refills: 0 | Status: SHIPPED | OUTPATIENT
Start: 2021-05-13 | End: 2021-05-18

## 2021-05-13 RX ORDER — DICYCLOMINE HYDROCHLORIDE 20 MG/1
20 TABLET ORAL 2 TIMES DAILY PRN
Qty: 20 TABLET | Refills: 0 | Status: SHIPPED | OUTPATIENT
Start: 2021-05-13 | End: 2021-06-12

## 2021-05-13 RX ORDER — ONDANSETRON 4 MG/1
4 TABLET, ORALLY DISINTEGRATING ORAL EVERY 8 HOURS PRN
Qty: 9 TABLET | Refills: 0 | Status: SHIPPED | OUTPATIENT
Start: 2021-05-13 | End: 2021-05-16

## 2021-05-13 RX ADMIN — ACETAMINOPHEN 650 MG: 325 TABLET ORAL at 02:05

## 2021-05-18 ENCOUNTER — PATIENT MESSAGE (OUTPATIENT)
Dept: FAMILY MEDICINE | Facility: HOSPITAL | Age: 31
End: 2021-05-18

## 2021-05-25 ENCOUNTER — IMMUNIZATION (OUTPATIENT)
Dept: INTERNAL MEDICINE | Facility: CLINIC | Age: 31
End: 2021-05-25
Payer: MEDICAID

## 2021-05-25 DIAGNOSIS — Z23 NEED FOR VACCINATION: Primary | ICD-10-CM

## 2021-05-25 PROCEDURE — 91300 COVID-19, MRNA, LNP-S, PF, 30 MCG/0.3 ML DOSE VACCINE: CPT | Mod: PBBFAC,PO

## 2021-06-17 ENCOUNTER — IMMUNIZATION (OUTPATIENT)
Dept: INTERNAL MEDICINE | Facility: CLINIC | Age: 31
End: 2021-06-17
Payer: MEDICAID

## 2021-06-17 DIAGNOSIS — Z23 NEED FOR VACCINATION: Primary | ICD-10-CM

## 2021-06-17 PROCEDURE — 91300 COVID-19, MRNA, LNP-S, PF, 30 MCG/0.3 ML DOSE VACCINE: CPT | Mod: PBBFAC

## 2021-06-17 PROCEDURE — 0002A COVID-19, MRNA, LNP-S, PF, 30 MCG/0.3 ML DOSE VACCINE: CPT | Mod: PBBFAC

## 2021-07-01 ENCOUNTER — HOSPITAL ENCOUNTER (EMERGENCY)
Facility: HOSPITAL | Age: 31
Discharge: HOME | End: 2021-07-01
Attending: EMERGENCY MEDICINE
Payer: MEDICAID

## 2021-07-01 VITALS
WEIGHT: 315 LBS | BODY MASS INDEX: 40.43 KG/M2 | HEIGHT: 74 IN | TEMPERATURE: 98 F | SYSTOLIC BLOOD PRESSURE: 143 MMHG | HEART RATE: 102 BPM | OXYGEN SATURATION: 99 % | DIASTOLIC BLOOD PRESSURE: 71 MMHG | RESPIRATION RATE: 19 BRPM

## 2021-07-01 DIAGNOSIS — J02.9 PHARYNGITIS, UNSPECIFIED ETIOLOGY: Primary | ICD-10-CM

## 2021-07-01 LAB
CTP QC/QA: YES
CTP QC/QA: YES
MOLECULAR STREP A: NEGATIVE
SARS-COV-2 RDRP RESP QL NAA+PROBE: NEGATIVE

## 2021-07-01 PROCEDURE — U0002 COVID-19 LAB TEST NON-CDC: HCPCS | Performed by: PHYSICIAN ASSISTANT

## 2021-07-01 PROCEDURE — 25000003 PHARM REV CODE 250: Performed by: PHYSICIAN ASSISTANT

## 2021-07-01 PROCEDURE — 99283 EMERGENCY DEPT VISIT LOW MDM: CPT

## 2021-07-01 RX ORDER — PREDNISONE 20 MG/1
60 TABLET ORAL DAILY
Qty: 9 TABLET | Refills: 0 | Status: SHIPPED | OUTPATIENT
Start: 2021-07-01 | End: 2021-07-04

## 2021-07-01 RX ORDER — ACETAMINOPHEN 500 MG
1000 TABLET ORAL
Status: COMPLETED | OUTPATIENT
Start: 2021-07-01 | End: 2021-07-01

## 2021-07-01 RX ADMIN — ACETAMINOPHEN 1000 MG: 500 TABLET, FILM COATED ORAL at 09:07

## 2021-10-01 PROBLEM — J93.82 OTHER AIR LEAK: Status: ACTIVE | Noted: 2020-12-02

## 2021-12-23 ENCOUNTER — IMMUNIZATION (OUTPATIENT)
Dept: INTERNAL MEDICINE | Facility: CLINIC | Age: 31
End: 2021-12-23
Payer: MEDICAID

## 2021-12-23 DIAGNOSIS — Z23 NEED FOR VACCINATION: Primary | ICD-10-CM

## 2021-12-23 PROCEDURE — 0004A COVID-19, MRNA, LNP-S, PF, 30 MCG/0.3 ML DOSE VACCINE: CPT | Mod: PBBFAC,PO

## (undated) DEVICE — PACK SET UP CONVERTORS

## (undated) DEVICE — SYR SLIP TIP 20CC

## (undated) DEVICE — SEE MEDLINE ITEM 157131

## (undated) DEVICE — GLOVE PI ULTRA TOUCH G SURGEON

## (undated) DEVICE — CATH EMBOLECTOMY 7FR 80CM

## (undated) DEVICE — FORCEP RAD JAW 4 1.8X2MM 100CM

## (undated) DEVICE — STOPCOCK DISCOFIX 3 WAY

## (undated) DEVICE — SEE MEDLINE ITEM 152622

## (undated) DEVICE — ADAPTER SWIVEL

## (undated) DEVICE — SEE MEDLINE ITEM 146416

## (undated) DEVICE — SEE MEDLINE ITEM 146313

## (undated) DEVICE — SEE MEDLINE ITEM 152487

## (undated) DEVICE — SYR 60CC W/GAUGE

## (undated) DEVICE — SYS LABEL CORRECT MED

## (undated) DEVICE — IMPLANTABLE DEVICE
Type: IMPLANTABLE DEVICE | Site: LUNG | Status: NON-FUNCTIONAL
Removed: 2020-10-28

## (undated) DEVICE — Device

## (undated) DEVICE — WIRE JAGWIRE 35G PULMONARY

## (undated) DEVICE — ELECTRODE REM PLYHSV RETURN 9

## (undated) DEVICE — CATH AND LOADER DEPLOYMENT HUD

## (undated) DEVICE — CATH EMBOLECTOMY 6FR

## (undated) DEVICE — FORCEP JAW RADIAL PULM 2X100CM